# Patient Record
Sex: FEMALE | Race: WHITE | URBAN - METROPOLITAN AREA
[De-identification: names, ages, dates, MRNs, and addresses within clinical notes are randomized per-mention and may not be internally consistent; named-entity substitution may affect disease eponyms.]

---

## 2017-01-18 ENCOUNTER — TELEPHONE (OUTPATIENT)
Dept: INTERNAL MEDICINE | Age: 82
End: 2017-01-18

## 2017-01-18 DIAGNOSIS — N39.0 URINARY TRACT INFECTION WITH HEMATURIA, SITE UNSPECIFIED: Primary | ICD-10-CM

## 2017-01-18 DIAGNOSIS — R31.9 URINARY TRACT INFECTION WITH HEMATURIA, SITE UNSPECIFIED: Primary | ICD-10-CM

## 2017-01-18 LAB
BILIRUBIN, POC: NEGATIVE
BLOOD URINE, POC: NORMAL
CLARITY, POC: CLEAR
COLOR, POC: YELLOW
GLUCOSE URINE, POC: NEGATIVE
KETONES, POC: NEGATIVE
LEUKOCYTE EST, POC: NORMAL
NITRITE, POC: NEGATIVE
PH, POC: 5
PROTEIN, POC: NEGATIVE
SPECIFIC GRAVITY, POC: 1.03
UROBILINOGEN, POC: NEGATIVE

## 2017-01-18 PROCEDURE — 81002 URINALYSIS NONAUTO W/O SCOPE: CPT | Performed by: INTERNAL MEDICINE

## 2017-01-18 RX ORDER — SULFAMETHOXAZOLE AND TRIMETHOPRIM 800; 160 MG/1; MG/1
1 TABLET ORAL 2 TIMES DAILY
Qty: 10 TABLET | Refills: 0 | Status: SHIPPED | OUTPATIENT
Start: 2017-01-18 | End: 2017-01-23

## 2017-01-20 LAB — URINE CULTURE, ROUTINE: NORMAL

## 2017-01-27 ENCOUNTER — TELEPHONE (OUTPATIENT)
Dept: INTERNAL MEDICINE | Age: 82
End: 2017-01-27

## 2017-02-10 ENCOUNTER — CARE COORDINATION (OUTPATIENT)
Dept: CARE COORDINATION | Age: 82
End: 2017-02-10

## 2017-02-21 ENCOUNTER — CARE COORDINATION (OUTPATIENT)
Dept: CARE COORDINATION | Age: 82
End: 2017-02-21

## 2017-02-27 ENCOUNTER — OFFICE VISIT (OUTPATIENT)
Dept: INTERNAL MEDICINE | Age: 82
End: 2017-02-27

## 2017-02-27 VITALS
DIASTOLIC BLOOD PRESSURE: 50 MMHG | OXYGEN SATURATION: 96 % | SYSTOLIC BLOOD PRESSURE: 108 MMHG | HEART RATE: 114 BPM | TEMPERATURE: 97.8 F

## 2017-02-27 DIAGNOSIS — E11.9 TYPE 2 DIABETES MELLITUS WITHOUT COMPLICATION, WITHOUT LONG-TERM CURRENT USE OF INSULIN (HCC): Primary | ICD-10-CM

## 2017-02-27 DIAGNOSIS — I10 ESSENTIAL HYPERTENSION, BENIGN: ICD-10-CM

## 2017-02-27 DIAGNOSIS — G30.1 LATE ONSET ALZHEIMER'S DISEASE WITHOUT BEHAVIORAL DISTURBANCE (HCC): ICD-10-CM

## 2017-02-27 DIAGNOSIS — F02.80 LATE ONSET ALZHEIMER'S DISEASE WITHOUT BEHAVIORAL DISTURBANCE (HCC): ICD-10-CM

## 2017-02-27 LAB
A/G RATIO: 1.6 (ref 1.1–2.2)
ALBUMIN SERPL-MCNC: 4 G/DL (ref 3.4–5)
ALP BLD-CCNC: 77 U/L (ref 40–129)
ALT SERPL-CCNC: 9 U/L (ref 10–40)
ANION GAP SERPL CALCULATED.3IONS-SCNC: 20 MMOL/L (ref 3–16)
AST SERPL-CCNC: 13 U/L (ref 15–37)
BASOPHILS ABSOLUTE: 0.1 K/UL (ref 0–0.2)
BASOPHILS RELATIVE PERCENT: 0.5 %
BILIRUB SERPL-MCNC: 0.3 MG/DL (ref 0–1)
BUN BLDV-MCNC: 17 MG/DL (ref 7–20)
CALCIUM SERPL-MCNC: 9.1 MG/DL (ref 8.3–10.6)
CHLORIDE BLD-SCNC: 97 MMOL/L (ref 99–110)
CHOLESTEROL, TOTAL: 203 MG/DL (ref 0–199)
CO2: 19 MMOL/L (ref 21–32)
CREAT SERPL-MCNC: 1 MG/DL (ref 0.6–1.2)
EOSINOPHILS ABSOLUTE: 0.1 K/UL (ref 0–0.6)
EOSINOPHILS RELATIVE PERCENT: 0.7 %
GFR AFRICAN AMERICAN: >60
GFR NON-AFRICAN AMERICAN: 53
GLOBULIN: 2.5 G/DL
GLUCOSE BLD-MCNC: 281 MG/DL (ref 70–99)
HCT VFR BLD CALC: 34.4 % (ref 36–48)
HDLC SERPL-MCNC: 51 MG/DL (ref 40–60)
HEMOGLOBIN: 10.8 G/DL (ref 12–16)
LDL CHOLESTEROL CALCULATED: 123 MG/DL
LYMPHOCYTES ABSOLUTE: 1.3 K/UL (ref 1–5.1)
LYMPHOCYTES RELATIVE PERCENT: 10 %
MCH RBC QN AUTO: 24.3 PG (ref 26–34)
MCHC RBC AUTO-ENTMCNC: 31.4 G/DL (ref 31–36)
MCV RBC AUTO: 77.6 FL (ref 80–100)
MONOCYTES ABSOLUTE: 0.8 K/UL (ref 0–1.3)
MONOCYTES RELATIVE PERCENT: 6.4 %
NEUTROPHILS ABSOLUTE: 10.9 K/UL (ref 1.7–7.7)
NEUTROPHILS RELATIVE PERCENT: 82.4 %
PDW BLD-RTO: 16.4 % (ref 12.4–15.4)
PLATELET # BLD: 278 K/UL (ref 135–450)
PMV BLD AUTO: 9.4 FL (ref 5–10.5)
POTASSIUM SERPL-SCNC: 4.6 MMOL/L (ref 3.5–5.1)
RBC # BLD: 4.43 M/UL (ref 4–5.2)
SODIUM BLD-SCNC: 136 MMOL/L (ref 136–145)
TOTAL PROTEIN: 6.5 G/DL (ref 6.4–8.2)
TRIGL SERPL-MCNC: 145 MG/DL (ref 0–150)
VLDLC SERPL CALC-MCNC: 29 MG/DL
WBC # BLD: 13.2 K/UL (ref 4–11)

## 2017-02-27 PROCEDURE — 1090F PRES/ABSN URINE INCON ASSESS: CPT | Performed by: INTERNAL MEDICINE

## 2017-02-27 PROCEDURE — 99214 OFFICE O/P EST MOD 30 MIN: CPT | Performed by: INTERNAL MEDICINE

## 2017-02-27 PROCEDURE — G8427 DOCREV CUR MEDS BY ELIG CLIN: HCPCS | Performed by: INTERNAL MEDICINE

## 2017-02-27 PROCEDURE — 36415 COLL VENOUS BLD VENIPUNCTURE: CPT | Performed by: INTERNAL MEDICINE

## 2017-02-27 PROCEDURE — G8484 FLU IMMUNIZE NO ADMIN: HCPCS | Performed by: INTERNAL MEDICINE

## 2017-02-27 PROCEDURE — G8399 PT W/DXA RESULTS DOCUMENT: HCPCS | Performed by: INTERNAL MEDICINE

## 2017-02-27 PROCEDURE — 1123F ACP DISCUSS/DSCN MKR DOCD: CPT | Performed by: INTERNAL MEDICINE

## 2017-02-27 PROCEDURE — 4040F PNEUMOC VAC/ADMIN/RCVD: CPT | Performed by: INTERNAL MEDICINE

## 2017-02-27 PROCEDURE — 1036F TOBACCO NON-USER: CPT | Performed by: INTERNAL MEDICINE

## 2017-02-27 PROCEDURE — G8419 CALC BMI OUT NRM PARAM NOF/U: HCPCS | Performed by: INTERNAL MEDICINE

## 2017-02-27 RX ORDER — OMEPRAZOLE 20 MG/1
20 CAPSULE, DELAYED RELEASE ORAL DAILY
Qty: 90 CAPSULE | Refills: 3 | Status: SHIPPED | OUTPATIENT
Start: 2017-02-27

## 2017-02-27 RX ORDER — NITROFURANTOIN MACROCRYSTALS 50 MG/1
50 CAPSULE ORAL DAILY
Qty: 90 CAPSULE | Refills: 5 | Status: SHIPPED | OUTPATIENT
Start: 2017-02-27

## 2017-02-27 RX ORDER — RIVASTIGMINE 9.5 MG/24H
1 PATCH, EXTENDED RELEASE TRANSDERMAL DAILY
Qty: 30 PATCH | Refills: 5 | Status: SHIPPED | OUTPATIENT
Start: 2017-02-27

## 2017-02-27 RX ORDER — GLIMEPIRIDE 2 MG/1
4 TABLET ORAL 2 TIMES DAILY WITH MEALS
Qty: 360 TABLET | Refills: 3 | Status: SHIPPED | OUTPATIENT
Start: 2017-02-27

## 2017-02-27 ASSESSMENT — ENCOUNTER SYMPTOMS
BACK PAIN: 0
GASTROINTESTINAL NEGATIVE: 1

## 2017-02-28 LAB
ESTIMATED AVERAGE GLUCOSE: 228.8 MG/DL
HBA1C MFR BLD: 9.6 %

## 2017-03-07 ENCOUNTER — CARE COORDINATION (OUTPATIENT)
Dept: CARE COORDINATION | Age: 82
End: 2017-03-07

## 2017-04-11 RX ORDER — RIVASTIGMINE 9.5 MG/24H
PATCH, EXTENDED RELEASE TRANSDERMAL
Qty: 90 PATCH | Refills: 3 | Status: SHIPPED | OUTPATIENT
Start: 2017-04-11

## 2017-04-11 RX ORDER — NITROFURANTOIN MACROCRYSTALS 50 MG/1
CAPSULE ORAL
Qty: 90 CAPSULE | Refills: 3 | Status: SHIPPED | OUTPATIENT
Start: 2017-04-11

## 2017-04-17 ENCOUNTER — TELEPHONE (OUTPATIENT)
Dept: INTERNAL MEDICINE | Age: 82
End: 2017-04-17

## 2017-06-06 ENCOUNTER — CARE COORDINATION (OUTPATIENT)
Dept: CARE COORDINATION | Age: 82
End: 2017-06-06

## 2017-06-29 ENCOUNTER — CARE COORDINATION (OUTPATIENT)
Dept: CARE COORDINATION | Age: 82
End: 2017-06-29

## 2017-08-17 ENCOUNTER — APPOINTMENT (OUTPATIENT)
Dept: GENERAL RADIOLOGY | Facility: HOSPITAL | Age: 82
End: 2017-08-17

## 2017-08-17 ENCOUNTER — HOSPITAL ENCOUNTER (EMERGENCY)
Facility: HOSPITAL | Age: 82
Discharge: HOME | End: 2017-08-17
Attending: EMERGENCY MEDICINE | Admitting: EMERGENCY MEDICINE

## 2017-08-17 ENCOUNTER — APPOINTMENT (OUTPATIENT)
Dept: CT IMAGING | Facility: HOSPITAL | Age: 82
End: 2017-08-17

## 2017-08-17 VITALS
HEIGHT: 59 IN | RESPIRATION RATE: 20 BRPM | SYSTOLIC BLOOD PRESSURE: 135 MMHG | WEIGHT: 187.5 LBS | TEMPERATURE: 98.4 F | OXYGEN SATURATION: 100 % | DIASTOLIC BLOOD PRESSURE: 69 MMHG | HEART RATE: 77 BPM | BODY MASS INDEX: 37.8 KG/M2

## 2017-08-17 DIAGNOSIS — N39.0 ACUTE UTI: Primary | ICD-10-CM

## 2017-08-17 DIAGNOSIS — F03.90: ICD-10-CM

## 2017-08-17 LAB
A-A DO2: 106.7 MMHG (ref 0–300)
ALBUMIN SERPL-MCNC: 4.3 G/DL (ref 3.4–4.8)
ALBUMIN/GLOB SERPL: 1.5 G/DL (ref 1.5–2.5)
ALP SERPL-CCNC: 65 U/L (ref 35–104)
ALT SERPL W P-5'-P-CCNC: 13 U/L (ref 10–36)
ANION GAP SERPL CALCULATED.3IONS-SCNC: 7 MMOL/L (ref 3.6–11.2)
AST SERPL-CCNC: 23 U/L (ref 10–30)
ATMOSPHERIC PRESS: 728 MMHG
BACTERIA UR QL AUTO: ABNORMAL /HPF
BASE EXCESS BLDV CALC-SCNC: 1.5 MMOL/L
BASOPHILS # BLD AUTO: 0.05 10*3/MM3 (ref 0–0.3)
BASOPHILS NFR BLD AUTO: 0.4 % (ref 0–2)
BDY SITE: NORMAL
BILIRUB SERPL-MCNC: 0.6 MG/DL (ref 0.2–1.8)
BILIRUB UR QL STRIP: ABNORMAL
BNP SERPL-MCNC: 72 PG/ML (ref 0–100)
BODY TEMPERATURE: 98.6 C
BUN BLD-MCNC: 16 MG/DL (ref 7–21)
BUN/CREAT SERPL: 13.7 (ref 7–25)
CALCIUM SPEC-SCNC: 9.3 MG/DL (ref 7.7–10)
CHLORIDE SERPL-SCNC: 102 MMOL/L (ref 99–112)
CK MB SERPL-CCNC: 5.09 NG/ML (ref 0–5)
CK MB SERPL-RTO: 1.8 % (ref 0–3)
CK SERPL-CCNC: 278 U/L (ref 24–173)
CLARITY UR: ABNORMAL
CO2 SERPL-SCNC: 27 MMOL/L (ref 24.3–31.9)
COLOR UR: ABNORMAL
CREAT BLD-MCNC: 1.17 MG/DL (ref 0.43–1.29)
D-LACTATE SERPL-SCNC: 1.6 MMOL/L (ref 0.5–2)
DEPRECATED RDW RBC AUTO: 46.8 FL (ref 37–54)
EOSINOPHIL # BLD AUTO: 0.38 10*3/MM3 (ref 0–0.7)
EOSINOPHIL NFR BLD AUTO: 2.8 % (ref 0–7)
ERYTHROCYTE [DISTWIDTH] IN BLOOD BY AUTOMATED COUNT: 17 % (ref 11.5–14.5)
GFR SERPL CREATININE-BSD FRML MDRD: 44 ML/MIN/1.73
GLOBULIN UR ELPH-MCNC: 2.8 GM/DL
GLUCOSE BLD-MCNC: 167 MG/DL (ref 70–110)
GLUCOSE BLDC GLUCOMTR-MCNC: 108 MG/DL (ref 70–130)
GLUCOSE BLDC GLUCOMTR-MCNC: 175 MG/DL (ref 70–130)
GLUCOSE UR STRIP-MCNC: ABNORMAL MG/DL
HCO3 BLDV-SCNC: 27.5 MMOL/L
HCT VFR BLD AUTO: 34.5 % (ref 37–47)
HGB BLD-MCNC: 10.2 G/DL (ref 12–16)
HGB UR QL STRIP.AUTO: ABNORMAL
HOLD SPECIMEN: NORMAL
HOLD SPECIMEN: NORMAL
HOROWITZ INDEX BLD+IHG-RTO: 28 %
HYALINE CASTS UR QL AUTO: ABNORMAL /LPF
IMM GRANULOCYTES # BLD: 0.07 10*3/MM3 (ref 0–0.03)
IMM GRANULOCYTES NFR BLD: 0.5 % (ref 0–0.5)
KETONES UR QL STRIP: ABNORMAL
LEUKOCYTE ESTERASE UR QL STRIP.AUTO: ABNORMAL
LYMPHOCYTES # BLD AUTO: 2.04 10*3/MM3 (ref 1–3)
LYMPHOCYTES NFR BLD AUTO: 15.1 % (ref 16–46)
MCH RBC QN AUTO: 23.1 PG (ref 27–33)
MCHC RBC AUTO-ENTMCNC: 29.6 G/DL (ref 33–37)
MCV RBC AUTO: 78.2 FL (ref 80–94)
MODALITY: NORMAL
MONOCYTES # BLD AUTO: 1.88 10*3/MM3 (ref 0.1–0.9)
MONOCYTES NFR BLD AUTO: 14 % (ref 0–12)
NEUTROPHILS # BLD AUTO: 9.05 10*3/MM3 (ref 1.4–6.5)
NEUTROPHILS NFR BLD AUTO: 67.2 % (ref 40–75)
NITRITE UR QL STRIP: NEGATIVE
OSMOLALITY SERPL CALC.SUM OF ELEC: 277 MOSM/KG (ref 273–305)
PCO2 BLDV: 49.8 MM HG
PH BLDV: 7.36 [PH]
PH UR STRIP.AUTO: <=5 [PH] (ref 5–8)
PLATELET # BLD AUTO: 289 10*3/MM3 (ref 130–400)
PMV BLD AUTO: 10.7 FL (ref 6–10)
PO2 BLDV: 25.2 MM HG
POTASSIUM BLD-SCNC: 4.3 MMOL/L (ref 3.5–5.3)
PROT SERPL-MCNC: 7.1 G/DL (ref 6–8)
PROT UR QL STRIP: ABNORMAL
RBC # BLD AUTO: 4.41 10*6/MM3 (ref 4.2–5.4)
RBC # UR: ABNORMAL /HPF
REF LAB TEST METHOD: ABNORMAL
SODIUM BLD-SCNC: 136 MMOL/L (ref 135–153)
SP GR UR STRIP: 1.02 (ref 1–1.03)
SQUAMOUS #/AREA URNS HPF: ABNORMAL /HPF
TROPONIN I SERPL-MCNC: <0.006 NG/ML
UROBILINOGEN UR QL STRIP: ABNORMAL
WBC NRBC COR # BLD: 13.47 10*3/MM3 (ref 4.5–12.5)
WBC UR QL AUTO: ABNORMAL /HPF
WHOLE BLOOD HOLD SPECIMEN: NORMAL
WHOLE BLOOD HOLD SPECIMEN: NORMAL

## 2017-08-17 PROCEDURE — 51702 INSERT TEMP BLADDER CATH: CPT

## 2017-08-17 PROCEDURE — 71010 HC CHEST PA OR AP: CPT

## 2017-08-17 PROCEDURE — 70450 CT HEAD/BRAIN W/O DYE: CPT | Performed by: RADIOLOGY

## 2017-08-17 PROCEDURE — 82805 BLOOD GASES W/O2 SATURATION: CPT | Performed by: EMERGENCY MEDICINE

## 2017-08-17 PROCEDURE — 82550 ASSAY OF CK (CPK): CPT | Performed by: PHYSICIAN ASSISTANT

## 2017-08-17 PROCEDURE — 83880 ASSAY OF NATRIURETIC PEPTIDE: CPT | Performed by: PHYSICIAN ASSISTANT

## 2017-08-17 PROCEDURE — 96361 HYDRATE IV INFUSION ADD-ON: CPT

## 2017-08-17 PROCEDURE — 82553 CREATINE MB FRACTION: CPT | Performed by: PHYSICIAN ASSISTANT

## 2017-08-17 PROCEDURE — 70450 CT HEAD/BRAIN W/O DYE: CPT

## 2017-08-17 PROCEDURE — 99284 EMERGENCY DEPT VISIT MOD MDM: CPT

## 2017-08-17 PROCEDURE — 87186 SC STD MICRODIL/AGAR DIL: CPT | Performed by: PHYSICIAN ASSISTANT

## 2017-08-17 PROCEDURE — 83605 ASSAY OF LACTIC ACID: CPT | Performed by: PHYSICIAN ASSISTANT

## 2017-08-17 PROCEDURE — 85025 COMPLETE CBC W/AUTO DIFF WBC: CPT | Performed by: PHYSICIAN ASSISTANT

## 2017-08-17 PROCEDURE — 25010000002 CEFTRIAXONE: Performed by: PHYSICIAN ASSISTANT

## 2017-08-17 PROCEDURE — 93005 ELECTROCARDIOGRAM TRACING: CPT | Performed by: PHYSICIAN ASSISTANT

## 2017-08-17 PROCEDURE — 84484 ASSAY OF TROPONIN QUANT: CPT | Performed by: PHYSICIAN ASSISTANT

## 2017-08-17 PROCEDURE — 87077 CULTURE AEROBIC IDENTIFY: CPT | Performed by: PHYSICIAN ASSISTANT

## 2017-08-17 PROCEDURE — 71010 XR CHEST 1 VW: CPT | Performed by: RADIOLOGY

## 2017-08-17 PROCEDURE — 87040 BLOOD CULTURE FOR BACTERIA: CPT | Performed by: PHYSICIAN ASSISTANT

## 2017-08-17 PROCEDURE — 96374 THER/PROPH/DIAG INJ IV PUSH: CPT

## 2017-08-17 PROCEDURE — 82962 GLUCOSE BLOOD TEST: CPT

## 2017-08-17 PROCEDURE — 80053 COMPREHEN METABOLIC PANEL: CPT | Performed by: PHYSICIAN ASSISTANT

## 2017-08-17 PROCEDURE — 87086 URINE CULTURE/COLONY COUNT: CPT | Performed by: PHYSICIAN ASSISTANT

## 2017-08-17 PROCEDURE — 81001 URINALYSIS AUTO W/SCOPE: CPT | Performed by: PHYSICIAN ASSISTANT

## 2017-08-17 PROCEDURE — 81003 URINALYSIS AUTO W/O SCOPE: CPT | Performed by: PHYSICIAN ASSISTANT

## 2017-08-17 RX ORDER — ERGOCALCIFEROL 1.25 MG/1
50000 CAPSULE ORAL WEEKLY
Status: ON HOLD | COMMUNITY
End: 2019-02-06

## 2017-08-17 RX ORDER — VALSARTAN 160 MG/1
320 TABLET ORAL DAILY
COMMUNITY
End: 2017-10-26 | Stop reason: DRUGHIGH

## 2017-08-17 RX ORDER — SODIUM CHLORIDE 0.9 % (FLUSH) 0.9 %
10 SYRINGE (ML) INJECTION AS NEEDED
Status: DISCONTINUED | OUTPATIENT
Start: 2017-08-17 | End: 2017-08-18 | Stop reason: HOSPADM

## 2017-08-17 RX ORDER — RIVASTIGMINE 9.5 MG/24H
1 PATCH, EXTENDED RELEASE TRANSDERMAL DAILY
COMMUNITY

## 2017-08-17 RX ORDER — CITALOPRAM 20 MG/1
20 TABLET ORAL DAILY
COMMUNITY
End: 2017-11-06 | Stop reason: HOSPADM

## 2017-08-17 RX ORDER — ONDANSETRON 4 MG/1
4 TABLET, FILM COATED ORAL EVERY 8 HOURS PRN
COMMUNITY
End: 2017-10-26

## 2017-08-17 RX ORDER — NITROFURANTOIN MACROCRYSTALS 50 MG/1
50 CAPSULE ORAL DAILY
COMMUNITY
End: 2017-11-06 | Stop reason: HOSPADM

## 2017-08-17 RX ORDER — OMEPRAZOLE 20 MG/1
20 CAPSULE, DELAYED RELEASE ORAL DAILY
COMMUNITY

## 2017-08-17 RX ORDER — AMOXICILLIN AND CLAVULANATE POTASSIUM 875; 125 MG/1; MG/1
1 TABLET, FILM COATED ORAL 2 TIMES DAILY
Qty: 20 TABLET | Refills: 0 | Status: SHIPPED | OUTPATIENT
Start: 2017-08-17 | End: 2017-10-26

## 2017-08-17 RX ORDER — GLIMEPIRIDE 2 MG/1
2 TABLET ORAL 2 TIMES DAILY
COMMUNITY
End: 2017-10-26

## 2017-08-17 RX ORDER — MECLIZINE HCL 12.5 MG/1
12.5 TABLET ORAL 3 TIMES DAILY PRN
COMMUNITY
End: 2017-10-26

## 2017-08-17 RX ORDER — LORATADINE 10 MG/1
CAPSULE, LIQUID FILLED ORAL
COMMUNITY
End: 2017-10-26

## 2017-08-17 RX ADMIN — CEFTRIAXONE 1 G: 1 INJECTION, POWDER, FOR SOLUTION INTRAMUSCULAR; INTRAVENOUS at 17:46

## 2017-08-17 RX ADMIN — SODIUM CHLORIDE 1000 ML: 9 INJECTION, SOLUTION INTRAVENOUS at 20:33

## 2017-08-17 RX ADMIN — SODIUM CHLORIDE 1000 ML: 9 INJECTION, SOLUTION INTRAVENOUS at 17:45

## 2017-08-18 NOTE — ED NOTES
MICHELLE CATH TAKEN OUT AT 21:50 AS ORDERED.  PT HAD TOTAL OF 400ML SLIGHTLY YELLOWISH ORANGE      Jaz Whyte, RN  08/17/17 2439

## 2017-08-18 NOTE — ED PROVIDER NOTES
Subjective   HPI Comments: 83 yo WF, presented to ER accompanied by daughter and son in law w/ increased altered mental status.  Pt daughter is source.  Pt suffers from dementia, however over the last two days sx if become increasingly worse.  Daughter admitted to multiple episodes of incontinence, and that pt suffers from chronic UTI.  Pt also recently has been started on Farxiga and Januvia for control of her diabetes.  Daughter denies any hx of cardiovascular illness, however admitted that pt has had cough that has become increasingly worse over last few days.       Review of Systems   Constitutional: Negative.  Negative for fever.   HENT: Negative.    Respiratory: Positive for cough.    Cardiovascular: Negative.  Negative for chest pain.   Gastrointestinal: Negative.  Negative for abdominal pain.   Endocrine: Negative.    Genitourinary: Positive for urgency. Negative for dysuria.   Skin: Negative.    Neurological: Positive for dizziness.   Psychiatric/Behavioral: Positive for confusion.   All other systems reviewed and are negative.      Past Medical History:   Diagnosis Date   • Diabetes mellitus    • Hypertension    • Pancreatitis        Allergies   Allergen Reactions   • Codeine    • Metformin And Related        Past Surgical History:   Procedure Laterality Date   • ANKLE SURGERY     • HYSTERECTOMY     • SHOULDER SURGERY     • TYMPANOPLASTY         History reviewed. No pertinent family history.    Social History     Social History   • Marital status:      Spouse name: N/A   • Number of children: N/A   • Years of education: N/A     Social History Main Topics   • Smoking status: Never Smoker   • Smokeless tobacco: None   • Alcohol use No   • Drug use: No   • Sexual activity: Not Asked     Other Topics Concern   • None     Social History Narrative   • None           Objective   Physical Exam   Constitutional: She is oriented to person, place, and time. She appears well-developed and well-nourished. No  distress.   HENT:   Head: Normocephalic and atraumatic.   Nose: Nose normal.   Eyes: Conjunctivae and EOM are normal. Pupils are equal, round, and reactive to light.   Neck: Normal range of motion. Neck supple. No JVD present. No tracheal deviation present.   Cardiovascular: Normal rate, regular rhythm and normal heart sounds.    No murmur heard.  Pulmonary/Chest: Effort normal. No respiratory distress. She has no wheezes. She has rales.   Abdominal: Soft. Bowel sounds are normal. There is tenderness (suprapubic. ).   Musculoskeletal: Normal range of motion. She exhibits no edema or deformity.   Pt was not able to stand to evaluate drift, or gait.  Obvious confusion to orientation.  No asymmetry w/ facial movements.     Neurological: She is alert and oriented to person, place, and time. No cranial nerve deficit.   Skin: Skin is warm and dry. No rash noted. She is not diaphoretic. No erythema. No pallor.   Psychiatric: She has a normal mood and affect. Her behavior is normal. Thought content normal.   Nursing note and vitals reviewed.      Procedures         ED Course  ED Course   Comment By Time   EKG reviewed by Dr Whyte:  Sinus rhythm w/ PAC, RBBB KACI Torre 08/17 1854   CT Head [facility]:  No acute intracranial hemorrhage.  Cerebral volume loss / atrophy and old right basal ganglia lacunar infarct.  Periventricular and deep white matter old microvascular ischemic changes are also described. KACI Torre 08/17 1936   CXR reviewed by Dr. Whyte:  Interstitial lung disease. KACI Torre 08/17 1936                  MDM  Number of Diagnoses or Management Options  new and requires workup  established and worsening     Amount and/or Complexity of Data Reviewed  Clinical lab tests: ordered and reviewed  Tests in the radiology section of CPT®: ordered and reviewed    Risk of Complications, Morbidity, and/or Mortality  Presenting problems: moderate  Diagnostic procedures: moderate  Management options:  low    Patient Progress  Patient progress: stable      Final diagnoses:   Acute UTI   Chronic dementia, without behavioral disturbance            KACI Torre  08/18/17 5569

## 2017-08-20 LAB — BACTERIA SPEC AEROBE CULT: ABNORMAL

## 2017-08-22 LAB — BACTERIA SPEC AEROBE CULT: NORMAL

## 2017-09-27 ENCOUNTER — CARE COORDINATION (OUTPATIENT)
Dept: CARE COORDINATION | Age: 82
End: 2017-09-27

## 2017-10-26 ENCOUNTER — APPOINTMENT (OUTPATIENT)
Dept: CT IMAGING | Facility: HOSPITAL | Age: 82
End: 2017-10-26

## 2017-10-26 ENCOUNTER — HOSPITAL ENCOUNTER (INPATIENT)
Facility: HOSPITAL | Age: 82
LOS: 11 days | Discharge: NURSING, REHAB OR LONG-TERM CARE | End: 2017-11-06
Attending: EMERGENCY MEDICINE | Admitting: HOSPITALIST

## 2017-10-26 ENCOUNTER — APPOINTMENT (OUTPATIENT)
Dept: CARDIOLOGY | Facility: HOSPITAL | Age: 82
End: 2017-10-26
Attending: HOSPITALIST

## 2017-10-26 ENCOUNTER — APPOINTMENT (OUTPATIENT)
Dept: GENERAL RADIOLOGY | Facility: HOSPITAL | Age: 82
End: 2017-10-26

## 2017-10-26 DIAGNOSIS — N30.00 ACUTE CYSTITIS WITHOUT HEMATURIA: ICD-10-CM

## 2017-10-26 DIAGNOSIS — I21.4 NSTEMI (NON-ST ELEVATED MYOCARDIAL INFARCTION) (HCC): Primary | ICD-10-CM

## 2017-10-26 PROBLEM — A41.9 SEPSIS DUE TO PNEUMONIA (HCC): Status: ACTIVE | Noted: 2017-10-26

## 2017-10-26 PROBLEM — N39.0 SEPSIS SECONDARY TO UTI (HCC): Status: ACTIVE | Noted: 2017-10-26

## 2017-10-26 PROBLEM — J18.9 SEPSIS DUE TO PNEUMONIA (HCC): Status: ACTIVE | Noted: 2017-10-26

## 2017-10-26 PROBLEM — F03.90 DEMENTIA (HCC): Chronic | Status: ACTIVE | Noted: 2017-10-26

## 2017-10-26 PROBLEM — R91.8 PULMONARY NODULES: Status: ACTIVE | Noted: 2017-10-26

## 2017-10-26 PROBLEM — K21.9 GERD (GASTROESOPHAGEAL REFLUX DISEASE): Chronic | Status: ACTIVE | Noted: 2017-10-26

## 2017-10-26 PROBLEM — J96.01 ACUTE RESPIRATORY FAILURE WITH HYPOXIA (HCC): Status: ACTIVE | Noted: 2017-10-26

## 2017-10-26 PROBLEM — R54 ADVANCED AGE: Chronic | Status: ACTIVE | Noted: 2017-10-26

## 2017-10-26 PROBLEM — I45.10 RBBB: Chronic | Status: ACTIVE | Noted: 2017-10-26

## 2017-10-26 PROBLEM — K44.9 HIATAL HERNIA: Chronic | Status: ACTIVE | Noted: 2017-10-26

## 2017-10-26 PROBLEM — R54 ADVANCED AGE: Status: ACTIVE | Noted: 2017-10-26

## 2017-10-26 PROBLEM — E11.9 TYPE II DIABETES MELLITUS (HCC): Chronic | Status: ACTIVE | Noted: 2017-10-26

## 2017-10-26 PROBLEM — I10 ESSENTIAL HYPERTENSION: Chronic | Status: ACTIVE | Noted: 2017-10-26

## 2017-10-26 PROBLEM — I63.81 LACUNAR INFARCTION (HCC): Chronic | Status: ACTIVE | Noted: 2017-10-26

## 2017-10-26 PROBLEM — I63.81 LACUNAR INFARCTION (HCC): Status: ACTIVE | Noted: 2017-10-26

## 2017-10-26 PROBLEM — A41.9 SEPSIS SECONDARY TO UTI (HCC): Status: ACTIVE | Noted: 2017-10-26

## 2017-10-26 LAB
A-A DO2: 44.3 MMHG (ref 0–300)
ALBUMIN SERPL-MCNC: 4 G/DL (ref 3.4–4.8)
ALBUMIN/GLOB SERPL: 1.4 G/DL (ref 1.5–2.5)
ALP SERPL-CCNC: 70 U/L (ref 35–104)
ALT SERPL W P-5'-P-CCNC: 18 U/L (ref 10–36)
ANION GAP SERPL CALCULATED.3IONS-SCNC: 9.7 MMOL/L (ref 3.6–11.2)
APTT PPP: 27.9 SECONDS (ref 23.8–36.1)
ARTERIAL PATENCY WRIST A: ABNORMAL
AST SERPL-CCNC: 26 U/L (ref 10–30)
ATMOSPHERIC PRESS: 723 MMHG
BACTERIA UR QL AUTO: ABNORMAL /HPF
BASE EXCESS BLDA CALC-SCNC: 0.2 MMOL/L
BASOPHILS # BLD AUTO: 0.03 10*3/MM3 (ref 0–0.3)
BASOPHILS NFR BLD AUTO: 0.3 % (ref 0–2)
BDY SITE: ABNORMAL
BILIRUB SERPL-MCNC: 0.7 MG/DL (ref 0.2–1.8)
BILIRUB UR QL STRIP: NEGATIVE
BNP SERPL-MCNC: 171 PG/ML (ref 0–100)
BODY TEMPERATURE: 98.6 C
BUN BLD-MCNC: 14 MG/DL (ref 7–21)
BUN/CREAT SERPL: 15.6 (ref 7–25)
CALCIUM SPEC-SCNC: 9.3 MG/DL (ref 7.7–10)
CHLORIDE SERPL-SCNC: 101 MMOL/L (ref 99–112)
CHOLEST SERPL-MCNC: 150 MG/DL (ref 0–200)
CK MB SERPL-CCNC: 18.84 NG/ML (ref 0–5)
CK MB SERPL-CCNC: 23.56 NG/ML (ref 0–5)
CK MB SERPL-CCNC: 26.35 NG/ML (ref 0–5)
CK MB SERPL-CCNC: 28.68 NG/ML (ref 0–5)
CK MB SERPL-CCNC: 31.58 NG/ML (ref 0–5)
CK MB SERPL-RTO: 11.2 % (ref 0–3)
CK MB SERPL-RTO: 11.9 % (ref 0–3)
CK MB SERPL-RTO: 13.6 % (ref 0–3)
CK MB SERPL-RTO: 13.9 % (ref 0–3)
CK MB SERPL-RTO: 14.1 % (ref 0–3)
CK SERPL-CCNC: 139 U/L (ref 24–173)
CK SERPL-CCNC: 170 U/L (ref 24–173)
CK SERPL-CCNC: 203 U/L (ref 24–173)
CK SERPL-CCNC: 235 U/L (ref 24–173)
CK SERPL-CCNC: 265 U/L (ref 24–173)
CLARITY UR: ABNORMAL
CO2 SERPL-SCNC: 26.3 MMOL/L (ref 24.3–31.9)
COHGB MFR BLD: 1.2 % (ref 0–5)
COLOR UR: YELLOW
CREAT BLD-MCNC: 0.9 MG/DL (ref 0.43–1.29)
D DIMER PPP FEU-MCNC: 0.28 MCGFEU/ML (ref 0–0.5)
D-LACTATE SERPL-SCNC: 1.8 MMOL/L (ref 0.5–2)
DEPRECATED RDW RBC AUTO: 59 FL (ref 37–54)
EOSINOPHIL # BLD AUTO: 0.04 10*3/MM3 (ref 0–0.7)
EOSINOPHIL NFR BLD AUTO: 0.4 % (ref 0–7)
ERYTHROCYTE [DISTWIDTH] IN BLOOD BY AUTOMATED COUNT: 19 % (ref 11.5–14.5)
FLUAV AG NPH QL: NEGATIVE
FLUBV AG NPH QL IA: NEGATIVE
GFR SERPL CREATININE-BSD FRML MDRD: 60 ML/MIN/1.73
GLOBULIN UR ELPH-MCNC: 2.8 GM/DL
GLUCOSE BLD-MCNC: 248 MG/DL (ref 70–110)
GLUCOSE BLDC GLUCOMTR-MCNC: 174 MG/DL (ref 70–130)
GLUCOSE BLDC GLUCOMTR-MCNC: 249 MG/DL (ref 70–130)
GLUCOSE BLDC GLUCOMTR-MCNC: 298 MG/DL (ref 70–130)
GLUCOSE UR STRIP-MCNC: ABNORMAL MG/DL
HBA1C MFR BLD: 8.9 % (ref 4.5–5.7)
HCO3 BLDA-SCNC: 25.3 MMOL/L (ref 22–26)
HCT VFR BLD AUTO: 41.9 % (ref 37–47)
HCT VFR BLD CALC: 39 % (ref 37–47)
HDLC SERPL-MCNC: 34 MG/DL (ref 60–100)
HGB BLD-MCNC: 13.2 G/DL (ref 12–16)
HGB BLDA-MCNC: 13.4 G/DL (ref 12–16)
HGB UR QL STRIP.AUTO: ABNORMAL
HOROWITZ INDEX BLD+IHG-RTO: 21 %
HYALINE CASTS UR QL AUTO: ABNORMAL /LPF
IMM GRANULOCYTES # BLD: 0.05 10*3/MM3 (ref 0–0.03)
IMM GRANULOCYTES NFR BLD: 0.5 % (ref 0–0.5)
INR PPP: 1.14 (ref 0.9–1.1)
KETONES UR QL STRIP: NEGATIVE
L PNEUMO1 AG UR QL IA: NEGATIVE
LDLC SERPL CALC-MCNC: 93 MG/DL (ref 0–100)
LDLC/HDLC SERPL: 2.72 {RATIO}
LEUKOCYTE ESTERASE UR QL STRIP.AUTO: ABNORMAL
LIPASE SERPL-CCNC: 26 U/L (ref 13–60)
LYMPHOCYTES # BLD AUTO: 0.62 10*3/MM3 (ref 1–3)
LYMPHOCYTES NFR BLD AUTO: 5.6 % (ref 16–46)
M PNEUMO IGM SER QL: NEGATIVE
MAGNESIUM SERPL-MCNC: 1.6 MG/DL (ref 1.7–2.6)
MCH RBC QN AUTO: 26.8 PG (ref 27–33)
MCHC RBC AUTO-ENTMCNC: 31.5 G/DL (ref 33–37)
MCV RBC AUTO: 85.2 FL (ref 80–94)
METHGB BLD QL: 0.3 % (ref 0–3)
MODALITY: ABNORMAL
MONOCYTES # BLD AUTO: 0.31 10*3/MM3 (ref 0.1–0.9)
MONOCYTES NFR BLD AUTO: 2.8 % (ref 0–12)
MYOGLOBIN SERPL-MCNC: 130 NG/ML (ref 0–109)
MYOGLOBIN SERPL-MCNC: 160 NG/ML (ref 0–109)
MYOGLOBIN SERPL-MCNC: 163 NG/ML (ref 0–109)
NEUTROPHILS # BLD AUTO: 9.97 10*3/MM3 (ref 1.4–6.5)
NEUTROPHILS NFR BLD AUTO: 90.4 % (ref 40–75)
NITRITE UR QL STRIP: NEGATIVE
OSMOLALITY SERPL CALC.SUM OF ELEC: 282.6 MOSM/KG (ref 273–305)
OXYHGB MFR BLDV: 77.8 % (ref 85–100)
PCO2 BLDA: 42.7 MM HG (ref 35–45)
PH BLDA: 7.39 PH UNITS (ref 7.35–7.45)
PH UR STRIP.AUTO: <=5 [PH] (ref 5–8)
PHOSPHATE SERPL-MCNC: 4.1 MG/DL (ref 2.7–4.5)
PLATELET # BLD AUTO: 212 10*3/MM3 (ref 130–400)
PMV BLD AUTO: 10.4 FL (ref 6–10)
PO2 BLDA: 46.5 MM HG (ref 80–100)
POTASSIUM BLD-SCNC: 3.9 MMOL/L (ref 3.5–5.3)
PROT SERPL-MCNC: 6.8 G/DL (ref 6–8)
PROT UR QL STRIP: ABNORMAL
PROTHROMBIN TIME: 14.8 SECONDS (ref 11–15.4)
RBC # BLD AUTO: 4.92 10*6/MM3 (ref 4.2–5.4)
RBC # UR: ABNORMAL /HPF
REF LAB TEST METHOD: ABNORMAL
SAO2 % BLDCOA: 79 % (ref 90–100)
SODIUM BLD-SCNC: 137 MMOL/L (ref 135–153)
SP GR UR STRIP: 1.02 (ref 1–1.03)
SQUAMOUS #/AREA URNS HPF: ABNORMAL /HPF
TRIGL SERPL-MCNC: 117 MG/DL (ref 0–150)
TROPONIN I SERPL-MCNC: 11.44 NG/ML
TROPONIN I SERPL-MCNC: 13.23 NG/ML
TROPONIN I SERPL-MCNC: 5.71 NG/ML
TROPONIN I SERPL-MCNC: 8 NG/ML
TROPONIN I SERPL-MCNC: 8.24 NG/ML
UROBILINOGEN UR QL STRIP: ABNORMAL
VLDLC SERPL-MCNC: 23.4 MG/DL
WBC NRBC COR # BLD: 11.02 10*3/MM3 (ref 4.5–12.5)
WBC UR QL AUTO: ABNORMAL /HPF

## 2017-10-26 PROCEDURE — 84100 ASSAY OF PHOSPHORUS: CPT | Performed by: PHYSICIAN ASSISTANT

## 2017-10-26 PROCEDURE — 99291 CRITICAL CARE FIRST HOUR: CPT | Performed by: INTERNAL MEDICINE

## 2017-10-26 PROCEDURE — 74177 CT ABD & PELVIS W/CONTRAST: CPT

## 2017-10-26 PROCEDURE — 25010000002 ENOXAPARIN PER 10 MG: Performed by: EMERGENCY MEDICINE

## 2017-10-26 PROCEDURE — 83874 ASSAY OF MYOGLOBIN: CPT | Performed by: PHYSICIAN ASSISTANT

## 2017-10-26 PROCEDURE — 85730 THROMBOPLASTIN TIME PARTIAL: CPT | Performed by: EMERGENCY MEDICINE

## 2017-10-26 PROCEDURE — 93010 ELECTROCARDIOGRAM REPORT: CPT | Performed by: INTERNAL MEDICINE

## 2017-10-26 PROCEDURE — 82550 ASSAY OF CK (CPK): CPT | Performed by: PHYSICIAN ASSISTANT

## 2017-10-26 PROCEDURE — 87077 CULTURE AEROBIC IDENTIFY: CPT | Performed by: EMERGENCY MEDICINE

## 2017-10-26 PROCEDURE — 71250 CT THORAX DX C-: CPT

## 2017-10-26 PROCEDURE — 82962 GLUCOSE BLOOD TEST: CPT

## 2017-10-26 PROCEDURE — 84484 ASSAY OF TROPONIN QUANT: CPT | Performed by: EMERGENCY MEDICINE

## 2017-10-26 PROCEDURE — 83036 HEMOGLOBIN GLYCOSYLATED A1C: CPT | Performed by: PHYSICIAN ASSISTANT

## 2017-10-26 PROCEDURE — 85025 COMPLETE CBC W/AUTO DIFF WBC: CPT | Performed by: EMERGENCY MEDICINE

## 2017-10-26 PROCEDURE — 82805 BLOOD GASES W/O2 SATURATION: CPT | Performed by: EMERGENCY MEDICINE

## 2017-10-26 PROCEDURE — 83050 HGB METHEMOGLOBIN QUAN: CPT | Performed by: EMERGENCY MEDICINE

## 2017-10-26 PROCEDURE — 92610 EVALUATE SWALLOWING FUNCTION: CPT | Performed by: SPEECH-LANGUAGE PATHOLOGIST

## 2017-10-26 PROCEDURE — 80053 COMPREHEN METABOLIC PANEL: CPT | Performed by: EMERGENCY MEDICINE

## 2017-10-26 PROCEDURE — G8996 SWALLOW CURRENT STATUS: HCPCS | Performed by: SPEECH-LANGUAGE PATHOLOGIST

## 2017-10-26 PROCEDURE — P9612 CATHETERIZE FOR URINE SPEC: HCPCS

## 2017-10-26 PROCEDURE — G8997 SWALLOW GOAL STATUS: HCPCS | Performed by: SPEECH-LANGUAGE PATHOLOGIST

## 2017-10-26 PROCEDURE — 87186 SC STD MICRODIL/AGAR DIL: CPT | Performed by: EMERGENCY MEDICINE

## 2017-10-26 PROCEDURE — 25010000002 CEFTRIAXONE: Performed by: EMERGENCY MEDICINE

## 2017-10-26 PROCEDURE — 93005 ELECTROCARDIOGRAM TRACING: CPT | Performed by: EMERGENCY MEDICINE

## 2017-10-26 PROCEDURE — 25010000002 ENOXAPARIN PER 10 MG: Performed by: INTERNAL MEDICINE

## 2017-10-26 PROCEDURE — 93005 ELECTROCARDIOGRAM TRACING: CPT | Performed by: HOSPITALIST

## 2017-10-26 PROCEDURE — 74177 CT ABD & PELVIS W/CONTRAST: CPT | Performed by: RADIOLOGY

## 2017-10-26 PROCEDURE — 85379 FIBRIN DEGRADATION QUANT: CPT | Performed by: EMERGENCY MEDICINE

## 2017-10-26 PROCEDURE — 0 IOPAMIDOL 61 % SOLUTION: Performed by: EMERGENCY MEDICINE

## 2017-10-26 PROCEDURE — 87899 AGENT NOS ASSAY W/OPTIC: CPT | Performed by: NURSE PRACTITIONER

## 2017-10-26 PROCEDURE — 82550 ASSAY OF CK (CPK): CPT | Performed by: EMERGENCY MEDICINE

## 2017-10-26 PROCEDURE — 82553 CREATINE MB FRACTION: CPT | Performed by: EMERGENCY MEDICINE

## 2017-10-26 PROCEDURE — 63710000001 INSULIN ASPART PER 5 UNITS: Performed by: PHYSICIAN ASSISTANT

## 2017-10-26 PROCEDURE — 83735 ASSAY OF MAGNESIUM: CPT | Performed by: PHYSICIAN ASSISTANT

## 2017-10-26 PROCEDURE — 36600 WITHDRAWAL OF ARTERIAL BLOOD: CPT | Performed by: EMERGENCY MEDICINE

## 2017-10-26 PROCEDURE — 82375 ASSAY CARBOXYHB QUANT: CPT | Performed by: EMERGENCY MEDICINE

## 2017-10-26 PROCEDURE — 93306 TTE W/DOPPLER COMPLETE: CPT

## 2017-10-26 PROCEDURE — 87086 URINE CULTURE/COLONY COUNT: CPT | Performed by: EMERGENCY MEDICINE

## 2017-10-26 PROCEDURE — 99285 EMERGENCY DEPT VISIT HI MDM: CPT

## 2017-10-26 PROCEDURE — 84484 ASSAY OF TROPONIN QUANT: CPT | Performed by: HOSPITALIST

## 2017-10-26 PROCEDURE — 25010000002 MAGNESIUM SULFATE 2 GM/50ML SOLUTION: Performed by: INTERNAL MEDICINE

## 2017-10-26 PROCEDURE — 83690 ASSAY OF LIPASE: CPT | Performed by: EMERGENCY MEDICINE

## 2017-10-26 PROCEDURE — 83880 ASSAY OF NATRIURETIC PEPTIDE: CPT | Performed by: EMERGENCY MEDICINE

## 2017-10-26 PROCEDURE — 81001 URINALYSIS AUTO W/SCOPE: CPT | Performed by: EMERGENCY MEDICINE

## 2017-10-26 PROCEDURE — 80061 LIPID PANEL: CPT | Performed by: EMERGENCY MEDICINE

## 2017-10-26 PROCEDURE — 82553 CREATINE MB FRACTION: CPT | Performed by: PHYSICIAN ASSISTANT

## 2017-10-26 PROCEDURE — 99223 1ST HOSP IP/OBS HIGH 75: CPT | Performed by: HOSPITALIST

## 2017-10-26 PROCEDURE — 94799 UNLISTED PULMONARY SVC/PX: CPT

## 2017-10-26 PROCEDURE — 71250 CT THORAX DX C-: CPT | Performed by: RADIOLOGY

## 2017-10-26 PROCEDURE — 83605 ASSAY OF LACTIC ACID: CPT | Performed by: EMERGENCY MEDICINE

## 2017-10-26 PROCEDURE — 36415 COLL VENOUS BLD VENIPUNCTURE: CPT

## 2017-10-26 PROCEDURE — 63710000001 INSULIN DETEMIR PER 5 UNITS: Performed by: PHYSICIAN ASSISTANT

## 2017-10-26 PROCEDURE — 71010 HC CHEST PA OR AP: CPT

## 2017-10-26 PROCEDURE — 99222 1ST HOSP IP/OBS MODERATE 55: CPT | Performed by: INTERNAL MEDICINE

## 2017-10-26 PROCEDURE — 71010 XR CHEST 1 VW: CPT | Performed by: RADIOLOGY

## 2017-10-26 PROCEDURE — 87040 BLOOD CULTURE FOR BACTERIA: CPT | Performed by: EMERGENCY MEDICINE

## 2017-10-26 PROCEDURE — 93306 TTE W/DOPPLER COMPLETE: CPT | Performed by: INTERNAL MEDICINE

## 2017-10-26 PROCEDURE — 85610 PROTHROMBIN TIME: CPT | Performed by: EMERGENCY MEDICINE

## 2017-10-26 PROCEDURE — 87804 INFLUENZA ASSAY W/OPTIC: CPT | Performed by: EMERGENCY MEDICINE

## 2017-10-26 RX ORDER — NICOTINE POLACRILEX 4 MG
15 LOZENGE BUCCAL
Status: DISCONTINUED | OUTPATIENT
Start: 2017-10-26 | End: 2017-11-06 | Stop reason: HOSPADM

## 2017-10-26 RX ORDER — PANTOPRAZOLE SODIUM 40 MG/1
40 TABLET, DELAYED RELEASE ORAL EVERY MORNING
Status: DISCONTINUED | OUTPATIENT
Start: 2017-10-26 | End: 2017-11-06 | Stop reason: HOSPADM

## 2017-10-26 RX ORDER — ACETAMINOPHEN 650 MG/1
650 SUPPOSITORY RECTAL ONCE
Status: COMPLETED | OUTPATIENT
Start: 2017-10-26 | End: 2017-10-26

## 2017-10-26 RX ORDER — MAGNESIUM SULFATE HEPTAHYDRATE 40 MG/ML
2 INJECTION, SOLUTION INTRAVENOUS ONCE
Status: COMPLETED | OUTPATIENT
Start: 2017-10-26 | End: 2017-10-26

## 2017-10-26 RX ORDER — ATORVASTATIN CALCIUM 40 MG/1
80 TABLET, FILM COATED ORAL NIGHTLY
Status: DISCONTINUED | OUTPATIENT
Start: 2017-10-26 | End: 2017-11-06 | Stop reason: HOSPADM

## 2017-10-26 RX ORDER — ASPIRIN 81 MG/1
324 TABLET, CHEWABLE ORAL ONCE
Status: CANCELLED | OUTPATIENT
Start: 2017-10-26 | End: 2017-10-26

## 2017-10-26 RX ORDER — ASPIRIN 300 MG/1
300 SUPPOSITORY RECTAL ONCE
Status: COMPLETED | OUTPATIENT
Start: 2017-10-26 | End: 2017-10-26

## 2017-10-26 RX ORDER — L.ACID,CASEI/B.ANIMAL/S.THERMO 16B CELL
1 CAPSULE ORAL DAILY
Status: DISCONTINUED | OUTPATIENT
Start: 2017-10-26 | End: 2017-11-06 | Stop reason: HOSPADM

## 2017-10-26 RX ORDER — VALSARTAN 320 MG/1
320 TABLET ORAL DAILY
COMMUNITY
End: 2017-11-06 | Stop reason: HOSPADM

## 2017-10-26 RX ORDER — CITALOPRAM 20 MG/1
20 TABLET ORAL DAILY
Status: CANCELLED | OUTPATIENT
Start: 2017-10-26

## 2017-10-26 RX ORDER — RIVASTIGMINE 9.5 MG/24H
1 PATCH, EXTENDED RELEASE TRANSDERMAL DAILY
Status: DISCONTINUED | OUTPATIENT
Start: 2017-10-26 | End: 2017-11-06 | Stop reason: HOSPADM

## 2017-10-26 RX ORDER — CLOPIDOGREL BISULFATE 75 MG/1
75 TABLET ORAL DAILY
Status: DISCONTINUED | OUTPATIENT
Start: 2017-10-27 | End: 2017-11-06 | Stop reason: HOSPADM

## 2017-10-26 RX ORDER — INSULIN GLARGINE 100 [IU]/ML
12 INJECTION, SOLUTION SUBCUTANEOUS DAILY
COMMUNITY
End: 2017-11-06 | Stop reason: HOSPADM

## 2017-10-26 RX ORDER — VALSARTAN 160 MG/1
320 TABLET ORAL DAILY
Status: CANCELLED | OUTPATIENT
Start: 2017-10-26

## 2017-10-26 RX ORDER — ACETAMINOPHEN 325 MG/1
650 TABLET ORAL EVERY 4 HOURS PRN
Status: DISCONTINUED | OUTPATIENT
Start: 2017-10-26 | End: 2017-11-06 | Stop reason: HOSPADM

## 2017-10-26 RX ORDER — ACETAMINOPHEN 650 MG/1
SUPPOSITORY RECTAL
Status: COMPLETED
Start: 2017-10-26 | End: 2017-10-26

## 2017-10-26 RX ORDER — MAGNESIUM SULFATE HEPTAHYDRATE 40 MG/ML
2 INJECTION, SOLUTION INTRAVENOUS AS NEEDED
Status: DISCONTINUED | OUTPATIENT
Start: 2017-10-26 | End: 2017-11-01

## 2017-10-26 RX ORDER — ASPIRIN 81 MG/1
81 TABLET ORAL DAILY
Status: CANCELLED | OUTPATIENT
Start: 2017-10-27

## 2017-10-26 RX ORDER — CLOPIDOGREL BISULFATE 75 MG/1
300 TABLET ORAL ONCE
Status: COMPLETED | OUTPATIENT
Start: 2017-10-26 | End: 2017-10-26

## 2017-10-26 RX ORDER — CETIRIZINE HYDROCHLORIDE 10 MG/1
5 TABLET ORAL DAILY
Status: DISCONTINUED | OUTPATIENT
Start: 2017-10-26 | End: 2017-11-06 | Stop reason: HOSPADM

## 2017-10-26 RX ORDER — METOPROLOL TARTRATE 5 MG/5ML
5 INJECTION INTRAVENOUS ONCE
Status: COMPLETED | OUTPATIENT
Start: 2017-10-26 | End: 2017-10-26

## 2017-10-26 RX ORDER — FERROUS SULFATE 325(65) MG
325 TABLET ORAL
COMMUNITY

## 2017-10-26 RX ORDER — NITROFURANTOIN MACROCRYSTALS 50 MG/1
50 CAPSULE ORAL DAILY
Status: CANCELLED | OUTPATIENT
Start: 2017-10-26

## 2017-10-26 RX ORDER — FERROUS SULFATE 325(65) MG
325 TABLET ORAL
Status: DISCONTINUED | OUTPATIENT
Start: 2017-10-26 | End: 2017-11-06 | Stop reason: HOSPADM

## 2017-10-26 RX ORDER — SODIUM CHLORIDE 9 MG/ML
40 INJECTION, SOLUTION INTRAVENOUS CONTINUOUS
Status: DISCONTINUED | OUTPATIENT
Start: 2017-10-26 | End: 2017-10-28

## 2017-10-26 RX ORDER — ACETAMINOPHEN 325 MG/1
1000 TABLET ORAL ONCE
Status: DISCONTINUED | OUTPATIENT
Start: 2017-10-26 | End: 2017-10-26

## 2017-10-26 RX ORDER — ASPIRIN 81 MG/1
81 TABLET, CHEWABLE ORAL DAILY
Status: DISCONTINUED | OUTPATIENT
Start: 2017-10-27 | End: 2017-10-29

## 2017-10-26 RX ORDER — SODIUM CHLORIDE 0.9 % (FLUSH) 0.9 %
10 SYRINGE (ML) INJECTION AS NEEDED
Status: DISCONTINUED | OUTPATIENT
Start: 2017-10-26 | End: 2017-11-06 | Stop reason: HOSPADM

## 2017-10-26 RX ORDER — DEXTROSE MONOHYDRATE 25 G/50ML
25 INJECTION, SOLUTION INTRAVENOUS
Status: DISCONTINUED | OUTPATIENT
Start: 2017-10-26 | End: 2017-11-06 | Stop reason: HOSPADM

## 2017-10-26 RX ORDER — MAGNESIUM SULFATE 1 G/100ML
1 INJECTION INTRAVENOUS AS NEEDED
Status: DISCONTINUED | OUTPATIENT
Start: 2017-10-26 | End: 2017-11-01

## 2017-10-26 RX ORDER — SODIUM CHLORIDE 0.9 % (FLUSH) 0.9 %
1-10 SYRINGE (ML) INJECTION AS NEEDED
Status: DISCONTINUED | OUTPATIENT
Start: 2017-10-26 | End: 2017-11-06 | Stop reason: HOSPADM

## 2017-10-26 RX ADMIN — ACETAMINOPHEN 650 MG: 650 SUPPOSITORY RECTAL at 02:46

## 2017-10-26 RX ADMIN — ATORVASTATIN CALCIUM 80 MG: 40 TABLET, FILM COATED ORAL at 20:02

## 2017-10-26 RX ADMIN — PANTOPRAZOLE SODIUM 40 MG: 40 TABLET, DELAYED RELEASE ORAL at 13:38

## 2017-10-26 RX ADMIN — CETIRIZINE HYDROCHLORIDE 5 MG: 10 TABLET ORAL at 13:38

## 2017-10-26 RX ADMIN — CLOPIDOGREL 300 MG: 75 TABLET, FILM COATED ORAL at 06:52

## 2017-10-26 RX ADMIN — ENOXAPARIN SODIUM 80 MG: 80 INJECTION SUBCUTANEOUS at 06:45

## 2017-10-26 RX ADMIN — Medication 1 CAPSULE: at 13:38

## 2017-10-26 RX ADMIN — MAGNESIUM SULFATE IN WATER 2 G: 40 INJECTION, SOLUTION INTRAVENOUS at 13:38

## 2017-10-26 RX ADMIN — INSULIN ASPART 2 UNITS: 100 INJECTION, SOLUTION INTRAVENOUS; SUBCUTANEOUS at 20:02

## 2017-10-26 RX ADMIN — INSULIN DETEMIR 12 UNITS: 100 INJECTION, SOLUTION SUBCUTANEOUS at 20:03

## 2017-10-26 RX ADMIN — IOPAMIDOL 100 ML: 612 INJECTION, SOLUTION INTRAVENOUS at 04:37

## 2017-10-26 RX ADMIN — SODIUM CHLORIDE 125 ML/HR: 9 INJECTION, SOLUTION INTRAVENOUS at 04:23

## 2017-10-26 RX ADMIN — RIVASTIGMINE 1 PATCH: 9.5 PATCH, EXTENDED RELEASE TRANSDERMAL at 13:38

## 2017-10-26 RX ADMIN — CEFTRIAXONE 1 G: 1 INJECTION, POWDER, FOR SOLUTION INTRAMUSCULAR; INTRAVENOUS at 03:22

## 2017-10-26 RX ADMIN — METRONIDAZOLE 500 MG: 500 INJECTION, SOLUTION INTRAVENOUS at 13:37

## 2017-10-26 RX ADMIN — ENOXAPARIN SODIUM 80 MG: 80 INJECTION SUBCUTANEOUS at 17:42

## 2017-10-26 RX ADMIN — SODIUM CHLORIDE 500 ML: 9 INJECTION, SOLUTION INTRAVENOUS at 03:21

## 2017-10-26 RX ADMIN — INSULIN ASPART 3 UNITS: 100 INJECTION, SOLUTION INTRAVENOUS; SUBCUTANEOUS at 13:33

## 2017-10-26 RX ADMIN — METRONIDAZOLE 500 MG: 500 INJECTION, SOLUTION INTRAVENOUS at 17:43

## 2017-10-26 RX ADMIN — SODIUM CHLORIDE 125 ML/HR: 9 INJECTION, SOLUTION INTRAVENOUS at 20:34

## 2017-10-26 RX ADMIN — INSULIN ASPART 4 UNITS: 100 INJECTION, SOLUTION INTRAVENOUS; SUBCUTANEOUS at 17:43

## 2017-10-26 RX ADMIN — ASPIRIN 300 MG: 300 SUPPOSITORY RECTAL at 06:53

## 2017-10-26 RX ADMIN — METOROPROLOL TARTRATE 1 MG: 5 INJECTION, SOLUTION INTRAVENOUS at 06:44

## 2017-10-27 LAB
ANION GAP SERPL CALCULATED.3IONS-SCNC: 5.2 MMOL/L (ref 3.6–11.2)
BASOPHILS # BLD AUTO: 0.02 10*3/MM3 (ref 0–0.3)
BASOPHILS NFR BLD AUTO: 0.3 % (ref 0–2)
BH CV ECHO MEAS - ACS: 1.3 CM
BH CV ECHO MEAS - AO MAX PG (FULL): 2.8 MMHG
BH CV ECHO MEAS - AO MAX PG: 6.9 MMHG
BH CV ECHO MEAS - AO MEAN PG (FULL): 1.8 MMHG
BH CV ECHO MEAS - AO MEAN PG: 4.2 MMHG
BH CV ECHO MEAS - AO ROOT AREA (BSA CORRECTED): 1.4
BH CV ECHO MEAS - AO ROOT AREA: 4.7 CM^2
BH CV ECHO MEAS - AO ROOT DIAM: 2.5 CM
BH CV ECHO MEAS - AO V2 MAX: 131 CM/SEC
BH CV ECHO MEAS - AO V2 MEAN: 99.8 CM/SEC
BH CV ECHO MEAS - AO V2 VTI: 20.7 CM
BH CV ECHO MEAS - BSA(HAYCOCK): 1.9 M^2
BH CV ECHO MEAS - BSA: 1.8 M^2
BH CV ECHO MEAS - BZI_BMI: 37.3 KILOGRAMS/M^2
BH CV ECHO MEAS - BZI_METRIC_HEIGHT: 150 CM
BH CV ECHO MEAS - BZI_METRIC_WEIGHT: 84 KG
BH CV ECHO MEAS - EDV(CUBED): 123.1 ML
BH CV ECHO MEAS - EDV(MOD-SP4): 30.5 ML
BH CV ECHO MEAS - EDV(TEICH): 116.9 ML
BH CV ECHO MEAS - EF(CUBED): 68 %
BH CV ECHO MEAS - EF(TEICH): 59.4 %
BH CV ECHO MEAS - ESV(CUBED): 39.4 ML
BH CV ECHO MEAS - ESV(TEICH): 47.5 ML
BH CV ECHO MEAS - FS: 31.6 %
BH CV ECHO MEAS - IVS/LVPW: 1.2
BH CV ECHO MEAS - IVSD: 1.1 CM
BH CV ECHO MEAS - LA DIMENSION: 3.7 CM
BH CV ECHO MEAS - LA/AO: 1.5
BH CV ECHO MEAS - LV DIASTOLIC VOL/BSA (35-75): 17.1 ML/M^2
BH CV ECHO MEAS - LV MASS(C)D: 183.5 GRAMS
BH CV ECHO MEAS - LV MASS(C)DI: 102.7 GRAMS/M^2
BH CV ECHO MEAS - LV MAX PG: 4.1 MMHG
BH CV ECHO MEAS - LV MEAN PG: 2.4 MMHG
BH CV ECHO MEAS - LV V1 MAX: 101 CM/SEC
BH CV ECHO MEAS - LV V1 MEAN: 74.7 CM/SEC
BH CV ECHO MEAS - LV V1 VTI: 17.8 CM
BH CV ECHO MEAS - LVIDD: 5 CM
BH CV ECHO MEAS - LVIDS: 3.4 CM
BH CV ECHO MEAS - LVPWD: 0.93 CM
BH CV ECHO MEAS - MV A MAX VEL: 94.8 CM/SEC
BH CV ECHO MEAS - MV E MAX VEL: 65.4 CM/SEC
BH CV ECHO MEAS - MV E/A: 0.69
BH CV ECHO MEAS - PA ACC TIME: 0.08 SEC
BH CV ECHO MEAS - PA PR(ACCEL): 42.6 MMHG
BH CV ECHO MEAS - RAP SYSTOLE: 10 MMHG
BH CV ECHO MEAS - RVDD: 2.7 CM
BH CV ECHO MEAS - RVSP: 44.4 MMHG
BH CV ECHO MEAS - SI(AO): 54.8 ML/M^2
BH CV ECHO MEAS - SI(CUBED): 46.9 ML/M^2
BH CV ECHO MEAS - SI(TEICH): 38.8 ML/M^2
BH CV ECHO MEAS - SV(AO): 97.9 ML
BH CV ECHO MEAS - SV(CUBED): 83.8 ML
BH CV ECHO MEAS - SV(TEICH): 69.4 ML
BH CV ECHO MEAS - TR MAX VEL: 293.1 CM/SEC
BUN BLD-MCNC: 14 MG/DL (ref 7–21)
BUN/CREAT SERPL: 19.7 (ref 7–25)
CALCIUM SPEC-SCNC: 8.2 MG/DL (ref 7.7–10)
CHLORIDE SERPL-SCNC: 106 MMOL/L (ref 99–112)
CK MB SERPL-CCNC: 26.32 NG/ML (ref 0–5)
CK MB SERPL-RTO: 12.5 % (ref 0–3)
CK SERPL-CCNC: 211 U/L (ref 24–173)
CO2 SERPL-SCNC: 25.8 MMOL/L (ref 24.3–31.9)
CREAT BLD-MCNC: 0.71 MG/DL (ref 0.43–1.29)
CRP SERPL-MCNC: 9.27 MG/DL (ref 0–0.99)
DEPRECATED RDW RBC AUTO: 60.2 FL (ref 37–54)
EOSINOPHIL # BLD AUTO: 0.01 10*3/MM3 (ref 0–0.7)
EOSINOPHIL NFR BLD AUTO: 0.1 % (ref 0–7)
ERYTHROCYTE [DISTWIDTH] IN BLOOD BY AUTOMATED COUNT: 19 % (ref 11.5–14.5)
GFR SERPL CREATININE-BSD FRML MDRD: 79 ML/MIN/1.73
GLUCOSE BLD-MCNC: 159 MG/DL (ref 70–110)
GLUCOSE BLDC GLUCOMTR-MCNC: 137 MG/DL (ref 70–130)
GLUCOSE BLDC GLUCOMTR-MCNC: 145 MG/DL (ref 70–130)
GLUCOSE BLDC GLUCOMTR-MCNC: 155 MG/DL (ref 70–130)
GLUCOSE BLDC GLUCOMTR-MCNC: 237 MG/DL (ref 70–130)
HCT VFR BLD AUTO: 37.4 % (ref 37–47)
HGB BLD-MCNC: 11.4 G/DL (ref 12–16)
IMM GRANULOCYTES # BLD: 0.02 10*3/MM3 (ref 0–0.03)
IMM GRANULOCYTES NFR BLD: 0.3 % (ref 0–0.5)
LV EF 2D ECHO EST: 40 %
LYMPHOCYTES # BLD AUTO: 1.24 10*3/MM3 (ref 1–3)
LYMPHOCYTES NFR BLD AUTO: 16.6 % (ref 16–46)
M PNEUMO IGM SER QL: NEGATIVE
MAGNESIUM SERPL-MCNC: 2.2 MG/DL (ref 1.7–2.6)
MCH RBC QN AUTO: 26.7 PG (ref 27–33)
MCHC RBC AUTO-ENTMCNC: 30.5 G/DL (ref 33–37)
MCV RBC AUTO: 87.6 FL (ref 80–94)
MONOCYTES # BLD AUTO: 0.97 10*3/MM3 (ref 0.1–0.9)
MONOCYTES NFR BLD AUTO: 13 % (ref 0–12)
NEUTROPHILS # BLD AUTO: 5.19 10*3/MM3 (ref 1.4–6.5)
NEUTROPHILS NFR BLD AUTO: 69.7 % (ref 40–75)
OSMOLALITY SERPL CALC.SUM OF ELEC: 277.7 MOSM/KG (ref 273–305)
PHOSPHATE SERPL-MCNC: 3.2 MG/DL (ref 2.7–4.5)
PLATELET # BLD AUTO: 147 10*3/MM3 (ref 130–400)
PMV BLD AUTO: 10.5 FL (ref 6–10)
POTASSIUM BLD-SCNC: 4.2 MMOL/L (ref 3.5–5.3)
RBC # BLD AUTO: 4.27 10*6/MM3 (ref 4.2–5.4)
SODIUM BLD-SCNC: 137 MMOL/L (ref 135–153)
TROPONIN I SERPL-MCNC: 16.02 NG/ML
TSH SERPL DL<=0.05 MIU/L-ACNC: 0.67 MIU/ML (ref 0.55–4.78)
WBC NRBC COR # BLD: 7.45 10*3/MM3 (ref 4.5–12.5)

## 2017-10-27 PROCEDURE — 84443 ASSAY THYROID STIM HORMONE: CPT | Performed by: PHYSICIAN ASSISTANT

## 2017-10-27 PROCEDURE — 99233 SBSQ HOSP IP/OBS HIGH 50: CPT | Performed by: INTERNAL MEDICINE

## 2017-10-27 PROCEDURE — 63710000001 INSULIN ASPART PER 5 UNITS: Performed by: PHYSICIAN ASSISTANT

## 2017-10-27 PROCEDURE — 84100 ASSAY OF PHOSPHORUS: CPT | Performed by: PHYSICIAN ASSISTANT

## 2017-10-27 PROCEDURE — 94799 UNLISTED PULMONARY SVC/PX: CPT

## 2017-10-27 PROCEDURE — 85025 COMPLETE CBC W/AUTO DIFF WBC: CPT | Performed by: INTERNAL MEDICINE

## 2017-10-27 PROCEDURE — 82553 CREATINE MB FRACTION: CPT | Performed by: INTERNAL MEDICINE

## 2017-10-27 PROCEDURE — 83735 ASSAY OF MAGNESIUM: CPT | Performed by: PHYSICIAN ASSISTANT

## 2017-10-27 PROCEDURE — 99232 SBSQ HOSP IP/OBS MODERATE 35: CPT | Performed by: INTERNAL MEDICINE

## 2017-10-27 PROCEDURE — 25010000002 CEFTRIAXONE: Performed by: PHYSICIAN ASSISTANT

## 2017-10-27 PROCEDURE — 25010000002 ENOXAPARIN PER 10 MG: Performed by: INTERNAL MEDICINE

## 2017-10-27 PROCEDURE — 63710000001 INSULIN DETEMIR PER 5 UNITS: Performed by: PHYSICIAN ASSISTANT

## 2017-10-27 PROCEDURE — 86140 C-REACTIVE PROTEIN: CPT | Performed by: PHYSICIAN ASSISTANT

## 2017-10-27 PROCEDURE — 80048 BASIC METABOLIC PNL TOTAL CA: CPT | Performed by: INTERNAL MEDICINE

## 2017-10-27 PROCEDURE — 82550 ASSAY OF CK (CPK): CPT | Performed by: INTERNAL MEDICINE

## 2017-10-27 PROCEDURE — 82962 GLUCOSE BLOOD TEST: CPT

## 2017-10-27 PROCEDURE — 84484 ASSAY OF TROPONIN QUANT: CPT | Performed by: INTERNAL MEDICINE

## 2017-10-27 PROCEDURE — 99233 SBSQ HOSP IP/OBS HIGH 50: CPT | Performed by: HOSPITALIST

## 2017-10-27 RX ORDER — CARVEDILOL 3.12 MG/1
3.12 TABLET ORAL 2 TIMES DAILY WITH MEALS
Status: DISCONTINUED | OUTPATIENT
Start: 2017-10-27 | End: 2017-10-29

## 2017-10-27 RX ORDER — CARVEDILOL 3.12 MG/1
3.12 TABLET ORAL EVERY 12 HOURS SCHEDULED
Status: DISCONTINUED | OUTPATIENT
Start: 2017-10-27 | End: 2017-10-27 | Stop reason: SDUPTHER

## 2017-10-27 RX ORDER — IPRATROPIUM BROMIDE AND ALBUTEROL SULFATE 2.5; .5 MG/3ML; MG/3ML
3 SOLUTION RESPIRATORY (INHALATION)
Status: DISCONTINUED | OUTPATIENT
Start: 2017-10-27 | End: 2017-10-28

## 2017-10-27 RX ORDER — IPRATROPIUM BROMIDE AND ALBUTEROL SULFATE 2.5; .5 MG/3ML; MG/3ML
3 SOLUTION RESPIRATORY (INHALATION) EVERY 6 HOURS PRN
Status: DISCONTINUED | OUTPATIENT
Start: 2017-10-27 | End: 2017-11-04

## 2017-10-27 RX ADMIN — FERROUS SULFATE TAB 325 MG (65 MG ELEMENTAL FE) 325 MG: 325 (65 FE) TAB at 09:39

## 2017-10-27 RX ADMIN — CEFTRIAXONE 1 G: 1 INJECTION, POWDER, FOR SOLUTION INTRAMUSCULAR; INTRAVENOUS at 05:46

## 2017-10-27 RX ADMIN — ENOXAPARIN SODIUM 80 MG: 80 INJECTION SUBCUTANEOUS at 05:46

## 2017-10-27 RX ADMIN — CLOPIDOGREL 75 MG: 75 TABLET, FILM COATED ORAL at 09:39

## 2017-10-27 RX ADMIN — CARVEDILOL 3.12 MG: 3.12 TABLET, FILM COATED ORAL at 21:49

## 2017-10-27 RX ADMIN — INSULIN ASPART 3 UNITS: 100 INJECTION, SOLUTION INTRAVENOUS; SUBCUTANEOUS at 21:49

## 2017-10-27 RX ADMIN — PANTOPRAZOLE SODIUM 40 MG: 40 TABLET, DELAYED RELEASE ORAL at 06:00

## 2017-10-27 RX ADMIN — INSULIN ASPART 2 UNITS: 100 INJECTION, SOLUTION INTRAVENOUS; SUBCUTANEOUS at 09:38

## 2017-10-27 RX ADMIN — ATORVASTATIN CALCIUM 80 MG: 40 TABLET, FILM COATED ORAL at 21:49

## 2017-10-27 RX ADMIN — IPRATROPIUM BROMIDE AND ALBUTEROL SULFATE 3 ML: .5; 3 SOLUTION RESPIRATORY (INHALATION) at 19:33

## 2017-10-27 RX ADMIN — RIVASTIGMINE 1 PATCH: 9.5 PATCH, EXTENDED RELEASE TRANSDERMAL at 09:40

## 2017-10-27 RX ADMIN — Medication 1 CAPSULE: at 09:39

## 2017-10-27 RX ADMIN — SODIUM CHLORIDE 75 ML/HR: 9 INJECTION, SOLUTION INTRAVENOUS at 11:34

## 2017-10-27 RX ADMIN — METRONIDAZOLE 500 MG: 500 INJECTION, SOLUTION INTRAVENOUS at 03:23

## 2017-10-27 RX ADMIN — INSULIN DETEMIR 12 UNITS: 100 INJECTION, SOLUTION SUBCUTANEOUS at 21:50

## 2017-10-27 RX ADMIN — CETIRIZINE HYDROCHLORIDE 5 MG: 10 TABLET ORAL at 09:39

## 2017-10-27 RX ADMIN — ASPIRIN 81 MG: 81 TABLET, CHEWABLE ORAL at 09:39

## 2017-10-28 ENCOUNTER — APPOINTMENT (OUTPATIENT)
Dept: GENERAL RADIOLOGY | Facility: HOSPITAL | Age: 82
End: 2017-10-28

## 2017-10-28 LAB
ANION GAP SERPL CALCULATED.3IONS-SCNC: 3.3 MMOL/L (ref 3.6–11.2)
BACTERIA SPEC AEROBE CULT: ABNORMAL
BACTERIA SPEC AEROBE CULT: ABNORMAL
BACTERIA UR QL AUTO: ABNORMAL /HPF
BASOPHILS # BLD AUTO: 0.03 10*3/MM3 (ref 0–0.3)
BASOPHILS NFR BLD AUTO: 0.4 % (ref 0–2)
BILIRUB UR QL STRIP: NEGATIVE
BNP SERPL-MCNC: 720 PG/ML (ref 0–100)
BUN BLD-MCNC: 13 MG/DL (ref 7–21)
BUN/CREAT SERPL: 18.8 (ref 7–25)
CALCIUM SPEC-SCNC: 7.9 MG/DL (ref 7.7–10)
CHLORIDE SERPL-SCNC: 108 MMOL/L (ref 99–112)
CK MB SERPL-CCNC: 11.08 NG/ML (ref 0–5)
CK MB SERPL-RTO: 11.5 % (ref 0–3)
CK SERPL-CCNC: 96 U/L (ref 24–173)
CLARITY UR: CLEAR
CO2 SERPL-SCNC: 26.7 MMOL/L (ref 24.3–31.9)
COLOR UR: ABNORMAL
CREAT BLD-MCNC: 0.69 MG/DL (ref 0.43–1.29)
CRP SERPL-MCNC: 8.38 MG/DL (ref 0–0.99)
DEPRECATED RDW RBC AUTO: 59 FL (ref 37–54)
EOSINOPHIL # BLD AUTO: 0.02 10*3/MM3 (ref 0–0.7)
EOSINOPHIL NFR BLD AUTO: 0.3 % (ref 0–7)
ERYTHROCYTE [DISTWIDTH] IN BLOOD BY AUTOMATED COUNT: 18.7 % (ref 11.5–14.5)
GFR SERPL CREATININE-BSD FRML MDRD: 81 ML/MIN/1.73
GLUCOSE BLD-MCNC: 180 MG/DL (ref 70–110)
GLUCOSE BLDC GLUCOMTR-MCNC: 158 MG/DL (ref 70–130)
GLUCOSE BLDC GLUCOMTR-MCNC: 175 MG/DL (ref 70–130)
GLUCOSE BLDC GLUCOMTR-MCNC: 187 MG/DL (ref 70–130)
GLUCOSE BLDC GLUCOMTR-MCNC: 223 MG/DL (ref 70–130)
GLUCOSE UR STRIP-MCNC: NEGATIVE MG/DL
HCT VFR BLD AUTO: 35.4 % (ref 37–47)
HGB BLD-MCNC: 10.9 G/DL (ref 12–16)
HGB UR QL STRIP.AUTO: NEGATIVE
HYALINE CASTS UR QL AUTO: ABNORMAL /LPF
IMM GRANULOCYTES # BLD: 0.02 10*3/MM3 (ref 0–0.03)
IMM GRANULOCYTES NFR BLD: 0.3 % (ref 0–0.5)
KETONES UR QL STRIP: ABNORMAL
LEUKOCYTE ESTERASE UR QL STRIP.AUTO: ABNORMAL
LYMPHOCYTES # BLD AUTO: 1.24 10*3/MM3 (ref 1–3)
LYMPHOCYTES NFR BLD AUTO: 16.7 % (ref 16–46)
MAGNESIUM SERPL-MCNC: 1.9 MG/DL (ref 1.7–2.6)
MCH RBC QN AUTO: 27.3 PG (ref 27–33)
MCHC RBC AUTO-ENTMCNC: 30.8 G/DL (ref 33–37)
MCV RBC AUTO: 88.5 FL (ref 80–94)
MONOCYTES # BLD AUTO: 1.04 10*3/MM3 (ref 0.1–0.9)
MONOCYTES NFR BLD AUTO: 14 % (ref 0–12)
NEUTROPHILS # BLD AUTO: 5.06 10*3/MM3 (ref 1.4–6.5)
NEUTROPHILS NFR BLD AUTO: 68.3 % (ref 40–75)
NITRITE UR QL STRIP: NEGATIVE
OSMOLALITY SERPL CALC.SUM OF ELEC: 280.3 MOSM/KG (ref 273–305)
PH UR STRIP.AUTO: <=5 [PH] (ref 5–8)
PHOSPHATE SERPL-MCNC: 2.4 MG/DL (ref 2.7–4.5)
PLATELET # BLD AUTO: 152 10*3/MM3 (ref 130–400)
PMV BLD AUTO: 10.6 FL (ref 6–10)
POTASSIUM BLD-SCNC: 4.1 MMOL/L (ref 3.5–5.3)
PROT UR QL STRIP: NEGATIVE
RBC # BLD AUTO: 4 10*6/MM3 (ref 4.2–5.4)
RBC # UR: ABNORMAL /HPF
REF LAB TEST METHOD: ABNORMAL
SODIUM BLD-SCNC: 138 MMOL/L (ref 135–153)
SP GR UR STRIP: 1.02 (ref 1–1.03)
SQUAMOUS #/AREA URNS HPF: ABNORMAL /HPF
TROPONIN I SERPL-MCNC: 8.08 NG/ML
TSH SERPL DL<=0.05 MIU/L-ACNC: 2.21 MIU/ML (ref 0.55–4.78)
UROBILINOGEN UR QL STRIP: ABNORMAL
WBC NRBC COR # BLD: 7.41 10*3/MM3 (ref 4.5–12.5)
WBC UR QL AUTO: ABNORMAL /HPF

## 2017-10-28 PROCEDURE — 80048 BASIC METABOLIC PNL TOTAL CA: CPT | Performed by: HOSPITALIST

## 2017-10-28 PROCEDURE — 99232 SBSQ HOSP IP/OBS MODERATE 35: CPT | Performed by: INTERNAL MEDICINE

## 2017-10-28 PROCEDURE — 99233 SBSQ HOSP IP/OBS HIGH 50: CPT | Performed by: HOSPITALIST

## 2017-10-28 PROCEDURE — 94799 UNLISTED PULMONARY SVC/PX: CPT

## 2017-10-28 PROCEDURE — 25010000002 DIGOXIN PER 500 MCG: Performed by: INTERNAL MEDICINE

## 2017-10-28 PROCEDURE — 25010000002 AMIODARONE IN DEXTROSE 5% 360-4.14 MG/200ML-% SOLUTION: Performed by: INTERNAL MEDICINE

## 2017-10-28 PROCEDURE — 82550 ASSAY OF CK (CPK): CPT | Performed by: HOSPITALIST

## 2017-10-28 PROCEDURE — 25010000002 MEROPENEM: Performed by: HOSPITALIST

## 2017-10-28 PROCEDURE — 87040 BLOOD CULTURE FOR BACTERIA: CPT | Performed by: INTERNAL MEDICINE

## 2017-10-28 PROCEDURE — 84100 ASSAY OF PHOSPHORUS: CPT | Performed by: PHYSICIAN ASSISTANT

## 2017-10-28 PROCEDURE — 71010 HC CHEST PA OR AP: CPT

## 2017-10-28 PROCEDURE — 82962 GLUCOSE BLOOD TEST: CPT

## 2017-10-28 PROCEDURE — 87086 URINE CULTURE/COLONY COUNT: CPT | Performed by: INTERNAL MEDICINE

## 2017-10-28 PROCEDURE — 93010 ELECTROCARDIOGRAM REPORT: CPT | Performed by: INTERNAL MEDICINE

## 2017-10-28 PROCEDURE — 93005 ELECTROCARDIOGRAM TRACING: CPT | Performed by: HOSPITALIST

## 2017-10-28 PROCEDURE — 71010 XR CHEST 1 VW: CPT | Performed by: RADIOLOGY

## 2017-10-28 PROCEDURE — 81001 URINALYSIS AUTO W/SCOPE: CPT | Performed by: INTERNAL MEDICINE

## 2017-10-28 PROCEDURE — 82553 CREATINE MB FRACTION: CPT | Performed by: HOSPITALIST

## 2017-10-28 PROCEDURE — 63710000001 INSULIN DETEMIR PER 5 UNITS: Performed by: PHYSICIAN ASSISTANT

## 2017-10-28 PROCEDURE — 85025 COMPLETE CBC W/AUTO DIFF WBC: CPT | Performed by: PHYSICIAN ASSISTANT

## 2017-10-28 PROCEDURE — 83880 ASSAY OF NATRIURETIC PEPTIDE: CPT | Performed by: HOSPITALIST

## 2017-10-28 PROCEDURE — 83735 ASSAY OF MAGNESIUM: CPT | Performed by: PHYSICIAN ASSISTANT

## 2017-10-28 PROCEDURE — 84443 ASSAY THYROID STIM HORMONE: CPT | Performed by: HOSPITALIST

## 2017-10-28 PROCEDURE — 86140 C-REACTIVE PROTEIN: CPT | Performed by: HOSPITALIST

## 2017-10-28 PROCEDURE — 25010000002 CEFTRIAXONE: Performed by: PHYSICIAN ASSISTANT

## 2017-10-28 PROCEDURE — 84484 ASSAY OF TROPONIN QUANT: CPT | Performed by: HOSPITALIST

## 2017-10-28 PROCEDURE — 25010000002 ENOXAPARIN PER 10 MG: Performed by: INTERNAL MEDICINE

## 2017-10-28 PROCEDURE — 63710000001 INSULIN ASPART PER 5 UNITS: Performed by: PHYSICIAN ASSISTANT

## 2017-10-28 RX ORDER — IPRATROPIUM BROMIDE AND ALBUTEROL SULFATE 2.5; .5 MG/3ML; MG/3ML
3 SOLUTION RESPIRATORY (INHALATION) EVERY 4 HOURS PRN
Status: DISCONTINUED | OUTPATIENT
Start: 2017-10-28 | End: 2017-11-06 | Stop reason: HOSPADM

## 2017-10-28 RX ORDER — LISINOPRIL 2.5 MG/1
2.5 TABLET ORAL
Status: DISCONTINUED | OUTPATIENT
Start: 2017-10-28 | End: 2017-10-30

## 2017-10-28 RX ORDER — DIGOXIN 0.25 MG/ML
250 INJECTION INTRAMUSCULAR; INTRAVENOUS ONCE
Status: COMPLETED | OUTPATIENT
Start: 2017-10-28 | End: 2017-10-28

## 2017-10-28 RX ORDER — SENNA AND DOCUSATE SODIUM 50; 8.6 MG/1; MG/1
2 TABLET, FILM COATED ORAL NIGHTLY
Status: DISCONTINUED | OUTPATIENT
Start: 2017-10-28 | End: 2017-11-04

## 2017-10-28 RX ADMIN — ACETAMINOPHEN 650 MG: 325 TABLET ORAL at 17:16

## 2017-10-28 RX ADMIN — INSULIN ASPART 2 UNITS: 100 INJECTION, SOLUTION INTRAVENOUS; SUBCUTANEOUS at 11:51

## 2017-10-28 RX ADMIN — CEFTRIAXONE 1 G: 1 INJECTION, POWDER, FOR SOLUTION INTRAMUSCULAR; INTRAVENOUS at 06:40

## 2017-10-28 RX ADMIN — AMIODARONE HYDROCHLORIDE 1 MG/MIN: 1.8 INJECTION, SOLUTION INTRAVENOUS at 11:51

## 2017-10-28 RX ADMIN — CLOPIDOGREL 75 MG: 75 TABLET, FILM COATED ORAL at 08:28

## 2017-10-28 RX ADMIN — CETIRIZINE HYDROCHLORIDE 5 MG: 10 TABLET ORAL at 08:27

## 2017-10-28 RX ADMIN — HYDROCORTISONE 2.5%: 25 CREAM TOPICAL at 16:57

## 2017-10-28 RX ADMIN — INSULIN ASPART 2 UNITS: 100 INJECTION, SOLUTION INTRAVENOUS; SUBCUTANEOUS at 16:58

## 2017-10-28 RX ADMIN — GUAIFENESIN 200 MG: 100 SOLUTION ORAL at 17:09

## 2017-10-28 RX ADMIN — MEROPENEM 1 G: 1 INJECTION, POWDER, FOR SOLUTION INTRAVENOUS at 16:58

## 2017-10-28 RX ADMIN — INSULIN ASPART 3 UNITS: 100 INJECTION, SOLUTION INTRAVENOUS; SUBCUTANEOUS at 21:26

## 2017-10-28 RX ADMIN — DIGOXIN 250 MCG: 0.25 INJECTION INTRAMUSCULAR; INTRAVENOUS at 11:50

## 2017-10-28 RX ADMIN — DILTIAZEM HYDROCHLORIDE 10 MG/HR: 100 INJECTION, POWDER, LYOPHILIZED, FOR SOLUTION INTRAVENOUS at 09:39

## 2017-10-28 RX ADMIN — ASPIRIN 81 MG: 81 TABLET, CHEWABLE ORAL at 08:28

## 2017-10-28 RX ADMIN — AMIODARONE HYDROCHLORIDE 1 MG/MIN: 1.8 INJECTION, SOLUTION INTRAVENOUS at 18:22

## 2017-10-28 RX ADMIN — DOCUSATE SODIUM AND SENNOSIDES 2 TABLET: 8.6; 5 TABLET, FILM COATED ORAL at 21:25

## 2017-10-28 RX ADMIN — DILTIAZEM HYDROCHLORIDE 10 MG/HR: 100 INJECTION, POWDER, LYOPHILIZED, FOR SOLUTION INTRAVENOUS at 09:30

## 2017-10-28 RX ADMIN — IPRATROPIUM BROMIDE AND ALBUTEROL SULFATE 3 ML: .5; 3 SOLUTION RESPIRATORY (INHALATION) at 19:37

## 2017-10-28 RX ADMIN — IPRATROPIUM BROMIDE AND ALBUTEROL SULFATE 3 ML: .5; 3 SOLUTION RESPIRATORY (INHALATION) at 07:14

## 2017-10-28 RX ADMIN — CARVEDILOL 3.12 MG: 3.12 TABLET, FILM COATED ORAL at 08:26

## 2017-10-28 RX ADMIN — LISINOPRIL 2.5 MG: 2.5 TABLET ORAL at 11:50

## 2017-10-28 RX ADMIN — HYDROCORTISONE 2.5%: 25 CREAM TOPICAL at 11:50

## 2017-10-28 RX ADMIN — AMIODARONE HYDROCHLORIDE 1 MG/MIN: 1.8 INJECTION, SOLUTION INTRAVENOUS at 23:50

## 2017-10-28 RX ADMIN — ATORVASTATIN CALCIUM 80 MG: 40 TABLET, FILM COATED ORAL at 21:25

## 2017-10-28 RX ADMIN — ENOXAPARIN SODIUM 30 MG: 30 INJECTION SUBCUTANEOUS at 06:40

## 2017-10-28 RX ADMIN — FERROUS SULFATE TAB 325 MG (65 MG ELEMENTAL FE) 325 MG: 325 (65 FE) TAB at 08:28

## 2017-10-28 RX ADMIN — CARVEDILOL 3.12 MG: 3.12 TABLET, FILM COATED ORAL at 17:09

## 2017-10-28 RX ADMIN — PANTOPRAZOLE SODIUM 40 MG: 40 TABLET, DELAYED RELEASE ORAL at 06:40

## 2017-10-28 RX ADMIN — INSULIN DETEMIR 12 UNITS: 100 INJECTION, SOLUTION SUBCUTANEOUS at 21:26

## 2017-10-28 RX ADMIN — RIVASTIGMINE 1 PATCH: 9.5 PATCH, EXTENDED RELEASE TRANSDERMAL at 08:27

## 2017-10-28 RX ADMIN — Medication 1 CAPSULE: at 08:28

## 2017-10-29 LAB
027 TOXIN: NORMAL
C DIFF TOX GENS STL QL NAA+PROBE: NEGATIVE
CK MB SERPL-CCNC: 4.03 NG/ML (ref 0–5)
CK MB SERPL-RTO: 8.2 % (ref 0–3)
CK SERPL-CCNC: 49 U/L (ref 24–173)
D-LACTATE SERPL-SCNC: 1.6 MMOL/L (ref 0.5–2)
GLUCOSE BLDC GLUCOMTR-MCNC: 195 MG/DL (ref 70–130)
GLUCOSE BLDC GLUCOMTR-MCNC: 211 MG/DL (ref 70–130)
GLUCOSE BLDC GLUCOMTR-MCNC: 222 MG/DL (ref 70–130)
GLUCOSE BLDC GLUCOMTR-MCNC: 234 MG/DL (ref 70–130)
TROPONIN I SERPL-MCNC: 2.39 NG/ML

## 2017-10-29 PROCEDURE — 82553 CREATINE MB FRACTION: CPT | Performed by: HOSPITALIST

## 2017-10-29 PROCEDURE — 25010000002 MEROPENEM: Performed by: HOSPITALIST

## 2017-10-29 PROCEDURE — 25010000002 FUROSEMIDE PER 20 MG: Performed by: INTERNAL MEDICINE

## 2017-10-29 PROCEDURE — 99233 SBSQ HOSP IP/OBS HIGH 50: CPT | Performed by: HOSPITALIST

## 2017-10-29 PROCEDURE — 25010000002 HALOPERIDOL LACTATE PER 5 MG: Performed by: HOSPITALIST

## 2017-10-29 PROCEDURE — 83605 ASSAY OF LACTIC ACID: CPT | Performed by: HOSPITALIST

## 2017-10-29 PROCEDURE — 94799 UNLISTED PULMONARY SVC/PX: CPT

## 2017-10-29 PROCEDURE — 25010000002 AMIODARONE IN DEXTROSE 5% 360-4.14 MG/200ML-% SOLUTION: Performed by: INTERNAL MEDICINE

## 2017-10-29 PROCEDURE — 84484 ASSAY OF TROPONIN QUANT: CPT | Performed by: HOSPITALIST

## 2017-10-29 PROCEDURE — 25010000002 FUROSEMIDE PER 20 MG: Performed by: HOSPITALIST

## 2017-10-29 PROCEDURE — 25010000002 ENOXAPARIN PER 10 MG: Performed by: INTERNAL MEDICINE

## 2017-10-29 PROCEDURE — 82550 ASSAY OF CK (CPK): CPT | Performed by: HOSPITALIST

## 2017-10-29 PROCEDURE — 82962 GLUCOSE BLOOD TEST: CPT

## 2017-10-29 PROCEDURE — 99232 SBSQ HOSP IP/OBS MODERATE 35: CPT | Performed by: INTERNAL MEDICINE

## 2017-10-29 PROCEDURE — 63710000001 INSULIN DETEMIR PER 5 UNITS: Performed by: PHYSICIAN ASSISTANT

## 2017-10-29 PROCEDURE — 63710000001 INSULIN ASPART PER 5 UNITS: Performed by: PHYSICIAN ASSISTANT

## 2017-10-29 PROCEDURE — 87493 C DIFF AMPLIFIED PROBE: CPT | Performed by: HOSPITALIST

## 2017-10-29 RX ORDER — AMIODARONE HYDROCHLORIDE 200 MG/1
200 TABLET ORAL 3 TIMES DAILY
Status: DISCONTINUED | OUTPATIENT
Start: 2017-10-29 | End: 2017-11-06 | Stop reason: HOSPADM

## 2017-10-29 RX ORDER — HALOPERIDOL 5 MG/ML
5 INJECTION INTRAMUSCULAR ONCE
Status: COMPLETED | OUTPATIENT
Start: 2017-10-29 | End: 2017-10-29

## 2017-10-29 RX ORDER — LOPERAMIDE HYDROCHLORIDE 2 MG/1
2 CAPSULE ORAL 3 TIMES DAILY PRN
Status: DISCONTINUED | OUTPATIENT
Start: 2017-10-29 | End: 2017-11-06 | Stop reason: HOSPADM

## 2017-10-29 RX ORDER — FUROSEMIDE 10 MG/ML
20 INJECTION INTRAMUSCULAR; INTRAVENOUS ONCE
Status: COMPLETED | OUTPATIENT
Start: 2017-10-29 | End: 2017-10-29

## 2017-10-29 RX ORDER — POTASSIUM CHLORIDE 750 MG/1
10 TABLET, FILM COATED, EXTENDED RELEASE ORAL DAILY
Status: DISCONTINUED | OUTPATIENT
Start: 2017-10-29 | End: 2017-10-30

## 2017-10-29 RX ORDER — FUROSEMIDE 10 MG/ML
20 INJECTION INTRAMUSCULAR; INTRAVENOUS DAILY
Status: DISCONTINUED | OUTPATIENT
Start: 2017-10-29 | End: 2017-10-30

## 2017-10-29 RX ORDER — CARVEDILOL 6.25 MG/1
6.25 TABLET ORAL 2 TIMES DAILY WITH MEALS
Status: DISCONTINUED | OUTPATIENT
Start: 2017-10-29 | End: 2017-11-06 | Stop reason: HOSPADM

## 2017-10-29 RX ADMIN — CARVEDILOL 3.12 MG: 3.12 TABLET, FILM COATED ORAL at 08:11

## 2017-10-29 RX ADMIN — INSULIN ASPART 3 UNITS: 100 INJECTION, SOLUTION INTRAVENOUS; SUBCUTANEOUS at 21:35

## 2017-10-29 RX ADMIN — INSULIN ASPART 3 UNITS: 100 INJECTION, SOLUTION INTRAVENOUS; SUBCUTANEOUS at 17:20

## 2017-10-29 RX ADMIN — RIVASTIGMINE 1 PATCH: 9.5 PATCH, EXTENDED RELEASE TRANSDERMAL at 08:12

## 2017-10-29 RX ADMIN — CARVEDILOL 6.25 MG: 6.25 TABLET, FILM COATED ORAL at 17:19

## 2017-10-29 RX ADMIN — HYDROCORTISONE 2.5%: 25 CREAM TOPICAL at 08:11

## 2017-10-29 RX ADMIN — GUAIFENESIN 200 MG: 100 SOLUTION ORAL at 13:52

## 2017-10-29 RX ADMIN — ASPIRIN 81 MG: 81 TABLET, CHEWABLE ORAL at 08:12

## 2017-10-29 RX ADMIN — POTASSIUM CHLORIDE 10 MEQ: 750 TABLET, FILM COATED, EXTENDED RELEASE ORAL at 13:53

## 2017-10-29 RX ADMIN — INSULIN ASPART 2 UNITS: 100 INJECTION, SOLUTION INTRAVENOUS; SUBCUTANEOUS at 08:12

## 2017-10-29 RX ADMIN — HALOPERIDOL LACTATE 5 MG: 5 INJECTION, SOLUTION INTRAMUSCULAR at 15:58

## 2017-10-29 RX ADMIN — INSULIN ASPART 3 UNITS: 100 INJECTION, SOLUTION INTRAVENOUS; SUBCUTANEOUS at 13:54

## 2017-10-29 RX ADMIN — IPRATROPIUM BROMIDE AND ALBUTEROL SULFATE 3 ML: .5; 3 SOLUTION RESPIRATORY (INHALATION) at 06:30

## 2017-10-29 RX ADMIN — FUROSEMIDE 20 MG: 10 INJECTION, SOLUTION INTRAMUSCULAR; INTRAVENOUS at 13:54

## 2017-10-29 RX ADMIN — MEROPENEM 1 G: 1 INJECTION, POWDER, FOR SOLUTION INTRAVENOUS at 08:10

## 2017-10-29 RX ADMIN — FERROUS SULFATE TAB 325 MG (65 MG ELEMENTAL FE) 325 MG: 325 (65 FE) TAB at 08:11

## 2017-10-29 RX ADMIN — IPRATROPIUM BROMIDE AND ALBUTEROL SULFATE 3 ML: .5; 3 SOLUTION RESPIRATORY (INHALATION) at 19:20

## 2017-10-29 RX ADMIN — AMIODARONE HYDROCHLORIDE 200 MG: 200 TABLET ORAL at 21:34

## 2017-10-29 RX ADMIN — INSULIN DETEMIR 12 UNITS: 100 INJECTION, SOLUTION SUBCUTANEOUS at 21:36

## 2017-10-29 RX ADMIN — LOPERAMIDE HYDROCHLORIDE 2 MG: 2 CAPSULE ORAL at 17:19

## 2017-10-29 RX ADMIN — LISINOPRIL 2.5 MG: 2.5 TABLET ORAL at 08:11

## 2017-10-29 RX ADMIN — HYDROCORTISONE 2.5%: 25 CREAM TOPICAL at 17:19

## 2017-10-29 RX ADMIN — DILTIAZEM HYDROCHLORIDE 10 MG/HR: 100 INJECTION, POWDER, LYOPHILIZED, FOR SOLUTION INTRAVENOUS at 00:40

## 2017-10-29 RX ADMIN — ENOXAPARIN SODIUM 30 MG: 30 INJECTION SUBCUTANEOUS at 06:52

## 2017-10-29 RX ADMIN — PANTOPRAZOLE SODIUM 40 MG: 40 TABLET, DELAYED RELEASE ORAL at 06:52

## 2017-10-29 RX ADMIN — MEROPENEM 1 G: 1 INJECTION, POWDER, FOR SOLUTION INTRAVENOUS at 00:32

## 2017-10-29 RX ADMIN — CETIRIZINE HYDROCHLORIDE 5 MG: 10 TABLET ORAL at 08:11

## 2017-10-29 RX ADMIN — CLOPIDOGREL 75 MG: 75 TABLET, FILM COATED ORAL at 08:12

## 2017-10-29 RX ADMIN — Medication 1 CAPSULE: at 08:11

## 2017-10-29 RX ADMIN — GUAIFENESIN 200 MG: 100 SOLUTION ORAL at 08:11

## 2017-10-29 RX ADMIN — MEROPENEM 1 G: 1 INJECTION, POWDER, FOR SOLUTION INTRAVENOUS at 13:53

## 2017-10-29 RX ADMIN — AMIODARONE HYDROCHLORIDE 200 MG: 200 TABLET ORAL at 13:53

## 2017-10-29 RX ADMIN — ATORVASTATIN CALCIUM 80 MG: 40 TABLET, FILM COATED ORAL at 21:35

## 2017-10-29 RX ADMIN — RIVAROXABAN 15 MG: 15 TABLET, FILM COATED ORAL at 17:19

## 2017-10-29 RX ADMIN — AMIODARONE HYDROCHLORIDE 1 MG/MIN: 1.8 INJECTION, SOLUTION INTRAVENOUS at 06:52

## 2017-10-30 ENCOUNTER — APPOINTMENT (OUTPATIENT)
Dept: GENERAL RADIOLOGY | Facility: HOSPITAL | Age: 82
End: 2017-10-30

## 2017-10-30 LAB
ANION GAP SERPL CALCULATED.3IONS-SCNC: 6 MMOL/L (ref 3.6–11.2)
BACTERIA SPEC AEROBE CULT: NO GROWTH
BASOPHILS # BLD AUTO: 0.04 10*3/MM3 (ref 0–0.3)
BASOPHILS NFR BLD AUTO: 0.5 % (ref 0–2)
BNP SERPL-MCNC: 818 PG/ML (ref 0–100)
BUN BLD-MCNC: 11 MG/DL (ref 7–21)
BUN/CREAT SERPL: 15.3 (ref 7–25)
CALCIUM SPEC-SCNC: 8.5 MG/DL (ref 7.7–10)
CHLORIDE SERPL-SCNC: 106 MMOL/L (ref 99–112)
CO2 SERPL-SCNC: 26 MMOL/L (ref 24.3–31.9)
CREAT BLD-MCNC: 0.72 MG/DL (ref 0.43–1.29)
DEPRECATED RDW RBC AUTO: 58.3 FL (ref 37–54)
EOSINOPHIL # BLD AUTO: 0.03 10*3/MM3 (ref 0–0.7)
EOSINOPHIL NFR BLD AUTO: 0.3 % (ref 0–7)
ERYTHROCYTE [DISTWIDTH] IN BLOOD BY AUTOMATED COUNT: 18.6 % (ref 11.5–14.5)
GFR SERPL CREATININE-BSD FRML MDRD: 77 ML/MIN/1.73
GLUCOSE BLD-MCNC: 197 MG/DL (ref 70–110)
GLUCOSE BLDC GLUCOMTR-MCNC: 138 MG/DL (ref 70–130)
GLUCOSE BLDC GLUCOMTR-MCNC: 168 MG/DL (ref 70–130)
GLUCOSE BLDC GLUCOMTR-MCNC: 171 MG/DL (ref 70–130)
GLUCOSE BLDC GLUCOMTR-MCNC: 203 MG/DL (ref 70–130)
GLUCOSE BLDC GLUCOMTR-MCNC: 269 MG/DL (ref 70–130)
HCT VFR BLD AUTO: 36.1 % (ref 37–47)
HGB BLD-MCNC: 11.1 G/DL (ref 12–16)
IMM GRANULOCYTES # BLD: 0.03 10*3/MM3 (ref 0–0.03)
IMM GRANULOCYTES NFR BLD: 0.3 % (ref 0–0.5)
LYMPHOCYTES # BLD AUTO: 1.27 10*3/MM3 (ref 1–3)
LYMPHOCYTES NFR BLD AUTO: 14.8 % (ref 16–46)
MCH RBC QN AUTO: 26.7 PG (ref 27–33)
MCHC RBC AUTO-ENTMCNC: 30.7 G/DL (ref 33–37)
MCV RBC AUTO: 86.8 FL (ref 80–94)
MONOCYTES # BLD AUTO: 0.98 10*3/MM3 (ref 0.1–0.9)
MONOCYTES NFR BLD AUTO: 11.4 % (ref 0–12)
NEUTROPHILS # BLD AUTO: 6.24 10*3/MM3 (ref 1.4–6.5)
NEUTROPHILS NFR BLD AUTO: 72.7 % (ref 40–75)
OSMOLALITY SERPL CALC.SUM OF ELEC: 280.6 MOSM/KG (ref 273–305)
PLATELET # BLD AUTO: 159 10*3/MM3 (ref 130–400)
PMV BLD AUTO: 10.5 FL (ref 6–10)
POTASSIUM BLD-SCNC: 3.9 MMOL/L (ref 3.5–5.3)
RBC # BLD AUTO: 4.16 10*6/MM3 (ref 4.2–5.4)
SODIUM BLD-SCNC: 138 MMOL/L (ref 135–153)
WBC NRBC COR # BLD: 8.59 10*3/MM3 (ref 4.5–12.5)

## 2017-10-30 PROCEDURE — 97163 PT EVAL HIGH COMPLEX 45 MIN: CPT

## 2017-10-30 PROCEDURE — 83880 ASSAY OF NATRIURETIC PEPTIDE: CPT | Performed by: INTERNAL MEDICINE

## 2017-10-30 PROCEDURE — 99233 SBSQ HOSP IP/OBS HIGH 50: CPT | Performed by: INTERNAL MEDICINE

## 2017-10-30 PROCEDURE — G8978 MOBILITY CURRENT STATUS: HCPCS

## 2017-10-30 PROCEDURE — 71010 XR CHEST AP: CPT | Performed by: RADIOLOGY

## 2017-10-30 PROCEDURE — 25010000002 FUROSEMIDE PER 20 MG: Performed by: INTERNAL MEDICINE

## 2017-10-30 PROCEDURE — 63710000001 INSULIN ASPART PER 5 UNITS: Performed by: PHYSICIAN ASSISTANT

## 2017-10-30 PROCEDURE — 85025 COMPLETE CBC W/AUTO DIFF WBC: CPT | Performed by: HOSPITALIST

## 2017-10-30 PROCEDURE — G8979 MOBILITY GOAL STATUS: HCPCS

## 2017-10-30 PROCEDURE — 63710000001 INSULIN DETEMIR PER 5 UNITS: Performed by: INTERNAL MEDICINE

## 2017-10-30 PROCEDURE — 97116 GAIT TRAINING THERAPY: CPT

## 2017-10-30 PROCEDURE — 25010000002 MEROPENEM: Performed by: HOSPITALIST

## 2017-10-30 PROCEDURE — 82962 GLUCOSE BLOOD TEST: CPT

## 2017-10-30 PROCEDURE — 25010000002 CEFTRIAXONE PER 250 MG: Performed by: INTERNAL MEDICINE

## 2017-10-30 PROCEDURE — 92526 ORAL FUNCTION THERAPY: CPT

## 2017-10-30 PROCEDURE — 80048 BASIC METABOLIC PNL TOTAL CA: CPT | Performed by: HOSPITALIST

## 2017-10-30 PROCEDURE — 94799 UNLISTED PULMONARY SVC/PX: CPT

## 2017-10-30 PROCEDURE — 99232 SBSQ HOSP IP/OBS MODERATE 35: CPT | Performed by: INTERNAL MEDICINE

## 2017-10-30 PROCEDURE — 71010 HC CHEST AP: CPT

## 2017-10-30 RX ORDER — FUROSEMIDE 10 MG/ML
40 INJECTION INTRAMUSCULAR; INTRAVENOUS EVERY 12 HOURS
Status: DISCONTINUED | OUTPATIENT
Start: 2017-10-30 | End: 2017-11-03

## 2017-10-30 RX ORDER — LISINOPRIL 2.5 MG/1
5 TABLET ORAL
Status: DISCONTINUED | OUTPATIENT
Start: 2017-10-31 | End: 2017-11-01

## 2017-10-30 RX ORDER — POTASSIUM CHLORIDE 20 MEQ/1
20 TABLET, EXTENDED RELEASE ORAL 2 TIMES DAILY WITH MEALS
Status: DISCONTINUED | OUTPATIENT
Start: 2017-10-30 | End: 2017-10-31

## 2017-10-30 RX ADMIN — POTASSIUM CHLORIDE 10 MEQ: 750 TABLET, FILM COATED, EXTENDED RELEASE ORAL at 09:03

## 2017-10-30 RX ADMIN — CETIRIZINE HYDROCHLORIDE 5 MG: 10 TABLET ORAL at 09:02

## 2017-10-30 RX ADMIN — AMIODARONE HYDROCHLORIDE 200 MG: 200 TABLET ORAL at 18:06

## 2017-10-30 RX ADMIN — ATORVASTATIN CALCIUM 80 MG: 40 TABLET, FILM COATED ORAL at 20:44

## 2017-10-30 RX ADMIN — DOCUSATE SODIUM AND SENNOSIDES 2 TABLET: 8.6; 5 TABLET, FILM COATED ORAL at 20:44

## 2017-10-30 RX ADMIN — MEROPENEM 1 G: 1 INJECTION, POWDER, FOR SOLUTION INTRAVENOUS at 00:58

## 2017-10-30 RX ADMIN — CARVEDILOL 6.25 MG: 6.25 TABLET, FILM COATED ORAL at 18:05

## 2017-10-30 RX ADMIN — POTASSIUM CHLORIDE 20 MEQ: 1500 TABLET, EXTENDED RELEASE ORAL at 18:06

## 2017-10-30 RX ADMIN — IPRATROPIUM BROMIDE AND ALBUTEROL SULFATE 3 ML: .5; 3 SOLUTION RESPIRATORY (INHALATION) at 10:44

## 2017-10-30 RX ADMIN — INSULIN DETEMIR 17 UNITS: 100 INJECTION, SOLUTION SUBCUTANEOUS at 20:43

## 2017-10-30 RX ADMIN — AMIODARONE HYDROCHLORIDE 200 MG: 200 TABLET ORAL at 20:44

## 2017-10-30 RX ADMIN — RIVAROXABAN 15 MG: 15 TABLET, FILM COATED ORAL at 18:05

## 2017-10-30 RX ADMIN — Medication 1 CAPSULE: at 09:02

## 2017-10-30 RX ADMIN — PANTOPRAZOLE SODIUM 40 MG: 40 TABLET, DELAYED RELEASE ORAL at 07:29

## 2017-10-30 RX ADMIN — CEFTRIAXONE 1 G: 1 INJECTION, SOLUTION INTRAVENOUS at 13:33

## 2017-10-30 RX ADMIN — METRONIDAZOLE 500 MG: 500 INJECTION, SOLUTION INTRAVENOUS at 21:25

## 2017-10-30 RX ADMIN — FUROSEMIDE 40 MG: 10 INJECTION, SOLUTION INTRAMUSCULAR; INTRAVENOUS at 20:49

## 2017-10-30 RX ADMIN — FUROSEMIDE 20 MG: 10 INJECTION, SOLUTION INTRAMUSCULAR; INTRAVENOUS at 09:03

## 2017-10-30 RX ADMIN — AMIODARONE HYDROCHLORIDE 200 MG: 200 TABLET ORAL at 09:03

## 2017-10-30 RX ADMIN — IPRATROPIUM BROMIDE AND ALBUTEROL SULFATE 3 ML: .5; 3 SOLUTION RESPIRATORY (INHALATION) at 19:19

## 2017-10-30 RX ADMIN — CARVEDILOL 6.25 MG: 6.25 TABLET, FILM COATED ORAL at 09:03

## 2017-10-30 RX ADMIN — HYDROCORTISONE 2.5%: 25 CREAM TOPICAL at 18:06

## 2017-10-30 RX ADMIN — INSULIN ASPART 4 UNITS: 100 INJECTION, SOLUTION INTRAVENOUS; SUBCUTANEOUS at 21:04

## 2017-10-30 RX ADMIN — INSULIN ASPART 3 UNITS: 100 INJECTION, SOLUTION INTRAVENOUS; SUBCUTANEOUS at 09:03

## 2017-10-30 RX ADMIN — FERROUS SULFATE TAB 325 MG (65 MG ELEMENTAL FE) 325 MG: 325 (65 FE) TAB at 09:03

## 2017-10-30 RX ADMIN — CLOPIDOGREL 75 MG: 75 TABLET, FILM COATED ORAL at 09:02

## 2017-10-30 RX ADMIN — METRONIDAZOLE 500 MG: 500 INJECTION, SOLUTION INTRAVENOUS at 13:33

## 2017-10-30 RX ADMIN — HYDROCORTISONE 2.5%: 25 CREAM TOPICAL at 09:04

## 2017-10-30 RX ADMIN — RIVASTIGMINE 1 PATCH: 9.5 PATCH, EXTENDED RELEASE TRANSDERMAL at 09:04

## 2017-10-30 RX ADMIN — LISINOPRIL 2.5 MG: 2.5 TABLET ORAL at 09:02

## 2017-10-30 RX ADMIN — INSULIN ASPART 2 UNITS: 100 INJECTION, SOLUTION INTRAVENOUS; SUBCUTANEOUS at 13:59

## 2017-10-30 RX ADMIN — IPRATROPIUM BROMIDE AND ALBUTEROL SULFATE 3 ML: .5; 3 SOLUTION RESPIRATORY (INHALATION) at 05:17

## 2017-10-30 RX ADMIN — MEROPENEM 1 G: 1 INJECTION, POWDER, FOR SOLUTION INTRAVENOUS at 10:57

## 2017-10-31 LAB
ALBUMIN SERPL-MCNC: 3.3 G/DL (ref 3.4–4.8)
ALBUMIN/GLOB SERPL: 1.2 G/DL (ref 1.5–2.5)
ALP SERPL-CCNC: 64 U/L (ref 35–104)
ALT SERPL W P-5'-P-CCNC: 19 U/L (ref 10–36)
ANION GAP SERPL CALCULATED.3IONS-SCNC: 5.7 MMOL/L (ref 3.6–11.2)
ANISOCYTOSIS BLD QL: ABNORMAL
AST SERPL-CCNC: 21 U/L (ref 10–30)
BACTERIA SPEC AEROBE CULT: NORMAL
BACTERIA SPEC AEROBE CULT: NORMAL
BILIRUB SERPL-MCNC: 0.4 MG/DL (ref 0.2–1.8)
BNP SERPL-MCNC: 446 PG/ML (ref 0–100)
BUN BLD-MCNC: 11 MG/DL (ref 7–21)
BUN/CREAT SERPL: 14.1 (ref 7–25)
CALCIUM SPEC-SCNC: 8.8 MG/DL (ref 7.7–10)
CHLORIDE SERPL-SCNC: 104 MMOL/L (ref 99–112)
CO2 SERPL-SCNC: 31.3 MMOL/L (ref 24.3–31.9)
CREAT BLD-MCNC: 0.78 MG/DL (ref 0.43–1.29)
CRP SERPL-MCNC: 3.64 MG/DL (ref 0–0.99)
DEPRECATED RDW RBC AUTO: 57.6 FL (ref 37–54)
EOSINOPHIL # BLD MANUAL: 0.08 10*3/MM3 (ref 0–0.7)
EOSINOPHIL NFR BLD MANUAL: 1 % (ref 0–7)
ERYTHROCYTE [DISTWIDTH] IN BLOOD BY AUTOMATED COUNT: 18.3 % (ref 11.5–14.5)
GFR SERPL CREATININE-BSD FRML MDRD: 71 ML/MIN/1.73
GLOBULIN UR ELPH-MCNC: 2.7 GM/DL
GLUCOSE BLD-MCNC: 140 MG/DL (ref 70–110)
GLUCOSE BLDC GLUCOMTR-MCNC: 148 MG/DL (ref 70–130)
GLUCOSE BLDC GLUCOMTR-MCNC: 174 MG/DL (ref 70–130)
GLUCOSE BLDC GLUCOMTR-MCNC: 257 MG/DL (ref 70–130)
GLUCOSE BLDC GLUCOMTR-MCNC: 308 MG/DL (ref 70–130)
GLUCOSE BLDC GLUCOMTR-MCNC: 329 MG/DL (ref 70–130)
HCT VFR BLD AUTO: 38.3 % (ref 37–47)
HGB BLD-MCNC: 11.8 G/DL (ref 12–16)
HYPOCHROMIA BLD QL: ABNORMAL
LYMPHOCYTES # BLD MANUAL: 1.28 10*3/MM3 (ref 1–3)
LYMPHOCYTES NFR BLD MANUAL: 16 % (ref 16–46)
LYMPHOCYTES NFR BLD MANUAL: 5 % (ref 0–12)
MAGNESIUM SERPL-MCNC: 1.6 MG/DL (ref 1.7–2.6)
MCH RBC QN AUTO: 26.4 PG (ref 27–33)
MCHC RBC AUTO-ENTMCNC: 30.8 G/DL (ref 33–37)
MCV RBC AUTO: 85.7 FL (ref 80–94)
MONOCYTES # BLD AUTO: 0.4 10*3/MM3 (ref 0.1–0.9)
NEUTROPHILS # BLD AUTO: 6.15 10*3/MM3 (ref 1.4–6.5)
NEUTROPHILS NFR BLD MANUAL: 77 % (ref 40–75)
OSMOLALITY SERPL CALC.SUM OF ELEC: 283 MOSM/KG (ref 273–305)
PLAT MORPH BLD: NORMAL
PLATELET # BLD AUTO: 205 10*3/MM3 (ref 130–400)
PMV BLD AUTO: 10.9 FL (ref 6–10)
POTASSIUM BLD-SCNC: 3.6 MMOL/L (ref 3.5–5.3)
PROT SERPL-MCNC: 6 G/DL (ref 6–8)
RBC # BLD AUTO: 4.47 10*6/MM3 (ref 4.2–5.4)
SCAN SLIDE: NORMAL
SODIUM BLD-SCNC: 141 MMOL/L (ref 135–153)
VARIANT LYMPHS NFR BLD MANUAL: 1 % (ref 0–5)
WBC NRBC COR # BLD: 7.99 10*3/MM3 (ref 4.5–12.5)

## 2017-10-31 PROCEDURE — G8998 SWALLOW D/C STATUS: HCPCS

## 2017-10-31 PROCEDURE — 94799 UNLISTED PULMONARY SVC/PX: CPT

## 2017-10-31 PROCEDURE — 63710000001 INSULIN ASPART PER 5 UNITS: Performed by: PHYSICIAN ASSISTANT

## 2017-10-31 PROCEDURE — 86140 C-REACTIVE PROTEIN: CPT | Performed by: INTERNAL MEDICINE

## 2017-10-31 PROCEDURE — 99233 SBSQ HOSP IP/OBS HIGH 50: CPT | Performed by: INTERNAL MEDICINE

## 2017-10-31 PROCEDURE — G8996 SWALLOW CURRENT STATUS: HCPCS

## 2017-10-31 PROCEDURE — 85025 COMPLETE CBC W/AUTO DIFF WBC: CPT | Performed by: INTERNAL MEDICINE

## 2017-10-31 PROCEDURE — 97116 GAIT TRAINING THERAPY: CPT

## 2017-10-31 PROCEDURE — 83735 ASSAY OF MAGNESIUM: CPT | Performed by: INTERNAL MEDICINE

## 2017-10-31 PROCEDURE — 25010000002 MAGNESIUM SULFATE IN D5W 1G/100ML (PREMIX) 1-5 GM/100ML-% SOLUTION: Performed by: PHYSICIAN ASSISTANT

## 2017-10-31 PROCEDURE — 25010000002 FUROSEMIDE PER 20 MG: Performed by: INTERNAL MEDICINE

## 2017-10-31 PROCEDURE — 85007 BL SMEAR W/DIFF WBC COUNT: CPT | Performed by: INTERNAL MEDICINE

## 2017-10-31 PROCEDURE — 63710000001 INSULIN ASPART PER 5 UNITS: Performed by: INTERNAL MEDICINE

## 2017-10-31 PROCEDURE — 82962 GLUCOSE BLOOD TEST: CPT

## 2017-10-31 PROCEDURE — 63710000001 INSULIN DETEMIR PER 5 UNITS: Performed by: INTERNAL MEDICINE

## 2017-10-31 PROCEDURE — 80053 COMPREHEN METABOLIC PANEL: CPT | Performed by: INTERNAL MEDICINE

## 2017-10-31 PROCEDURE — 97530 THERAPEUTIC ACTIVITIES: CPT

## 2017-10-31 PROCEDURE — G8997 SWALLOW GOAL STATUS: HCPCS

## 2017-10-31 PROCEDURE — 99232 SBSQ HOSP IP/OBS MODERATE 35: CPT | Performed by: INTERNAL MEDICINE

## 2017-10-31 PROCEDURE — 83880 ASSAY OF NATRIURETIC PEPTIDE: CPT | Performed by: INTERNAL MEDICINE

## 2017-10-31 PROCEDURE — 25010000002 MAGNESIUM SULFATE IN D5W 1G/100ML (PREMIX) 1-5 GM/100ML-% SOLUTION: Performed by: INTERNAL MEDICINE

## 2017-10-31 PROCEDURE — 25010000002 CEFTRIAXONE PER 250 MG: Performed by: INTERNAL MEDICINE

## 2017-10-31 PROCEDURE — 25010000002 METHYLPREDNISOLONE PER 40 MG: Performed by: NURSE PRACTITIONER

## 2017-10-31 PROCEDURE — 92526 ORAL FUNCTION THERAPY: CPT

## 2017-10-31 RX ORDER — MAGNESIUM SULFATE 1 G/100ML
1 INJECTION INTRAVENOUS ONCE
Status: COMPLETED | OUTPATIENT
Start: 2017-10-31 | End: 2017-10-31

## 2017-10-31 RX ORDER — FUROSEMIDE 10 MG/ML
40 INJECTION INTRAMUSCULAR; INTRAVENOUS ONCE
Status: COMPLETED | OUTPATIENT
Start: 2017-10-31 | End: 2017-10-31

## 2017-10-31 RX ORDER — POTASSIUM CHLORIDE 1.5 G/1.77G
40 POWDER, FOR SOLUTION ORAL 3 TIMES DAILY
Status: COMPLETED | OUTPATIENT
Start: 2017-10-31 | End: 2017-10-31

## 2017-10-31 RX ORDER — METHYLPREDNISOLONE SODIUM SUCCINATE 40 MG/ML
40 INJECTION, POWDER, LYOPHILIZED, FOR SOLUTION INTRAMUSCULAR; INTRAVENOUS EVERY 12 HOURS
Status: DISCONTINUED | OUTPATIENT
Start: 2017-10-31 | End: 2017-11-02

## 2017-10-31 RX ADMIN — RIVASTIGMINE 1 PATCH: 9.5 PATCH, EXTENDED RELEASE TRANSDERMAL at 09:53

## 2017-10-31 RX ADMIN — POTASSIUM CHLORIDE 20 MEQ: 1500 TABLET, EXTENDED RELEASE ORAL at 09:52

## 2017-10-31 RX ADMIN — CARVEDILOL 6.25 MG: 6.25 TABLET, FILM COATED ORAL at 09:52

## 2017-10-31 RX ADMIN — AMIODARONE HYDROCHLORIDE 200 MG: 200 TABLET ORAL at 20:36

## 2017-10-31 RX ADMIN — CLOPIDOGREL 75 MG: 75 TABLET, FILM COATED ORAL at 09:53

## 2017-10-31 RX ADMIN — FUROSEMIDE 40 MG: 10 INJECTION, SOLUTION INTRAMUSCULAR; INTRAVENOUS at 09:53

## 2017-10-31 RX ADMIN — Medication: at 17:41

## 2017-10-31 RX ADMIN — INSULIN DETEMIR 17 UNITS: 100 INJECTION, SOLUTION SUBCUTANEOUS at 20:38

## 2017-10-31 RX ADMIN — CEFTRIAXONE 1 G: 1 INJECTION, SOLUTION INTRAVENOUS at 13:25

## 2017-10-31 RX ADMIN — CARVEDILOL 6.25 MG: 6.25 TABLET, FILM COATED ORAL at 17:40

## 2017-10-31 RX ADMIN — Medication 1 APPLICATION: at 20:35

## 2017-10-31 RX ADMIN — POTASSIUM CHLORIDE 40 MEQ: 1.5 POWDER, FOR SOLUTION ORAL at 13:26

## 2017-10-31 RX ADMIN — PANTOPRAZOLE SODIUM 40 MG: 40 TABLET, DELAYED RELEASE ORAL at 06:02

## 2017-10-31 RX ADMIN — INSULIN ASPART 5 UNITS: 100 INJECTION, SOLUTION INTRAVENOUS; SUBCUTANEOUS at 09:54

## 2017-10-31 RX ADMIN — GUAIFENESIN 200 MG: 100 SOLUTION ORAL at 06:05

## 2017-10-31 RX ADMIN — METRONIDAZOLE 500 MG: 500 INJECTION, SOLUTION INTRAVENOUS at 21:00

## 2017-10-31 RX ADMIN — HYDROCORTISONE 2.5%: 25 CREAM TOPICAL at 17:40

## 2017-10-31 RX ADMIN — Medication 1 CAPSULE: at 09:55

## 2017-10-31 RX ADMIN — LISINOPRIL 5 MG: 2.5 TABLET ORAL at 09:55

## 2017-10-31 RX ADMIN — AMIODARONE HYDROCHLORIDE 200 MG: 200 TABLET ORAL at 09:50

## 2017-10-31 RX ADMIN — IPRATROPIUM BROMIDE AND ALBUTEROL SULFATE 3 ML: .5; 3 SOLUTION RESPIRATORY (INHALATION) at 06:23

## 2017-10-31 RX ADMIN — METHYLPREDNISOLONE SODIUM SUCCINATE 40 MG: 40 INJECTION, POWDER, FOR SOLUTION INTRAMUSCULAR; INTRAVENOUS at 20:35

## 2017-10-31 RX ADMIN — DOCUSATE SODIUM AND SENNOSIDES 2 TABLET: 8.6; 5 TABLET, FILM COATED ORAL at 20:36

## 2017-10-31 RX ADMIN — ATORVASTATIN CALCIUM 80 MG: 40 TABLET, FILM COATED ORAL at 20:35

## 2017-10-31 RX ADMIN — CETIRIZINE HYDROCHLORIDE 5 MG: 10 TABLET ORAL at 09:52

## 2017-10-31 RX ADMIN — LOPERAMIDE HYDROCHLORIDE 2 MG: 2 CAPSULE ORAL at 17:40

## 2017-10-31 RX ADMIN — POTASSIUM CHLORIDE 40 MEQ: 1.5 POWDER, FOR SOLUTION ORAL at 17:39

## 2017-10-31 RX ADMIN — INSULIN ASPART 5 UNITS: 100 INJECTION, SOLUTION INTRAVENOUS; SUBCUTANEOUS at 17:38

## 2017-10-31 RX ADMIN — INSULIN ASPART 2 UNITS: 100 INJECTION, SOLUTION INTRAVENOUS; SUBCUTANEOUS at 09:54

## 2017-10-31 RX ADMIN — FERROUS SULFATE TAB 325 MG (65 MG ELEMENTAL FE) 325 MG: 325 (65 FE) TAB at 09:50

## 2017-10-31 RX ADMIN — FUROSEMIDE 40 MG: 10 INJECTION, SOLUTION INTRAMUSCULAR; INTRAVENOUS at 20:35

## 2017-10-31 RX ADMIN — METRONIDAZOLE 500 MG: 500 INJECTION, SOLUTION INTRAVENOUS at 06:02

## 2017-10-31 RX ADMIN — POTASSIUM CHLORIDE 40 MEQ: 1.5 POWDER, FOR SOLUTION ORAL at 20:36

## 2017-10-31 RX ADMIN — AMIODARONE HYDROCHLORIDE 200 MG: 200 TABLET ORAL at 17:40

## 2017-10-31 RX ADMIN — IPRATROPIUM BROMIDE 0.5 MG: 0.5 SOLUTION RESPIRATORY (INHALATION) at 13:42

## 2017-10-31 RX ADMIN — MAGNESIUM SULFATE IN DEXTROSE 1 G: 10 INJECTION, SOLUTION INTRAVENOUS at 06:02

## 2017-10-31 RX ADMIN — MAGNESIUM SULFATE IN DEXTROSE 1 G: 10 INJECTION, SOLUTION INTRAVENOUS at 13:26

## 2017-10-31 RX ADMIN — IPRATROPIUM BROMIDE 0.5 MG: 0.5 SOLUTION RESPIRATORY (INHALATION) at 18:50

## 2017-10-31 RX ADMIN — RIVAROXABAN 15 MG: 15 TABLET, FILM COATED ORAL at 17:40

## 2017-10-31 RX ADMIN — OFLOXACIN 50000 UNITS: 300 TABLET, COATED ORAL at 09:53

## 2017-10-31 RX ADMIN — FUROSEMIDE 40 MG: 10 INJECTION, SOLUTION INTRAMUSCULAR; INTRAVENOUS at 13:51

## 2017-10-31 RX ADMIN — METHYLPREDNISOLONE SODIUM SUCCINATE 40 MG: 40 INJECTION, POWDER, FOR SOLUTION INTRAMUSCULAR; INTRAVENOUS at 09:55

## 2017-10-31 RX ADMIN — HYDROCORTISONE 2.5%: 25 CREAM TOPICAL at 09:56

## 2017-10-31 RX ADMIN — INSULIN ASPART 5 UNITS: 100 INJECTION, SOLUTION INTRAVENOUS; SUBCUTANEOUS at 17:39

## 2017-10-31 RX ADMIN — INSULIN ASPART 4 UNITS: 100 INJECTION, SOLUTION INTRAVENOUS; SUBCUTANEOUS at 21:02

## 2017-10-31 RX ADMIN — METRONIDAZOLE 500 MG: 500 INJECTION, SOLUTION INTRAVENOUS at 13:26

## 2017-11-01 ENCOUNTER — APPOINTMENT (OUTPATIENT)
Dept: GENERAL RADIOLOGY | Facility: HOSPITAL | Age: 82
End: 2017-11-01

## 2017-11-01 LAB
ANION GAP SERPL CALCULATED.3IONS-SCNC: 8.5 MMOL/L (ref 3.6–11.2)
ANISOCYTOSIS BLD QL: NORMAL
BASOPHILS # BLD AUTO: 0.03 10*3/MM3 (ref 0–0.3)
BASOPHILS NFR BLD AUTO: 0.2 % (ref 0–2)
BUN BLD-MCNC: 22 MG/DL (ref 7–21)
BUN/CREAT SERPL: 23.9 (ref 7–25)
CALCIUM SPEC-SCNC: 8.8 MG/DL (ref 7.7–10)
CHLORIDE SERPL-SCNC: 99 MMOL/L (ref 99–112)
CO2 SERPL-SCNC: 30.5 MMOL/L (ref 24.3–31.9)
CREAT BLD-MCNC: 0.92 MG/DL (ref 0.43–1.29)
DEPRECATED RDW RBC AUTO: 57.1 FL (ref 37–54)
EOSINOPHIL # BLD AUTO: 0 10*3/MM3 (ref 0–0.7)
EOSINOPHIL NFR BLD AUTO: 0 % (ref 0–7)
ERYTHROCYTE [DISTWIDTH] IN BLOOD BY AUTOMATED COUNT: 18.4 % (ref 11.5–14.5)
GFR SERPL CREATININE-BSD FRML MDRD: 58 ML/MIN/1.73
GLUCOSE BLD-MCNC: 342 MG/DL (ref 70–110)
GLUCOSE BLDC GLUCOMTR-MCNC: 245 MG/DL (ref 70–130)
GLUCOSE BLDC GLUCOMTR-MCNC: 277 MG/DL (ref 70–130)
GLUCOSE BLDC GLUCOMTR-MCNC: 377 MG/DL (ref 70–130)
GLUCOSE BLDC GLUCOMTR-MCNC: 396 MG/DL (ref 70–130)
HCT VFR BLD AUTO: 39.4 % (ref 37–47)
HGB BLD-MCNC: 12.3 G/DL (ref 12–16)
HYPOCHROMIA BLD QL: NORMAL
IMM GRANULOCYTES # BLD: 0.08 10*3/MM3 (ref 0–0.03)
IMM GRANULOCYTES NFR BLD: 0.5 % (ref 0–0.5)
LYMPHOCYTES # BLD AUTO: 1.29 10*3/MM3 (ref 1–3)
LYMPHOCYTES NFR BLD AUTO: 8.4 % (ref 16–46)
MAGNESIUM SERPL-MCNC: 1.8 MG/DL (ref 1.7–2.6)
MCH RBC QN AUTO: 26.5 PG (ref 27–33)
MCHC RBC AUTO-ENTMCNC: 31.2 G/DL (ref 33–37)
MCV RBC AUTO: 84.9 FL (ref 80–94)
MONOCYTES # BLD AUTO: 0.93 10*3/MM3 (ref 0.1–0.9)
MONOCYTES NFR BLD AUTO: 6.1 % (ref 0–12)
NEUTROPHILS # BLD AUTO: 12.97 10*3/MM3 (ref 1.4–6.5)
NEUTROPHILS NFR BLD AUTO: 84.8 % (ref 40–75)
OSMOLALITY SERPL CALC.SUM OF ELEC: 292.5 MOSM/KG (ref 273–305)
PLAT MORPH BLD: NORMAL
PLATELET # BLD AUTO: 290 10*3/MM3 (ref 130–400)
PMV BLD AUTO: 10.8 FL (ref 6–10)
POTASSIUM BLD-SCNC: 4.4 MMOL/L (ref 3.5–5.3)
RBC # BLD AUTO: 4.64 10*6/MM3 (ref 4.2–5.4)
SODIUM BLD-SCNC: 138 MMOL/L (ref 135–153)
WBC NRBC COR # BLD: 15.3 10*3/MM3 (ref 4.5–12.5)

## 2017-11-01 PROCEDURE — G8997 SWALLOW GOAL STATUS: HCPCS

## 2017-11-01 PROCEDURE — 74230 X-RAY XM SWLNG FUNCJ C+: CPT

## 2017-11-01 PROCEDURE — G8996 SWALLOW CURRENT STATUS: HCPCS

## 2017-11-01 PROCEDURE — 82962 GLUCOSE BLOOD TEST: CPT

## 2017-11-01 PROCEDURE — 63710000001 INSULIN DETEMIR PER 5 UNITS: Performed by: INTERNAL MEDICINE

## 2017-11-01 PROCEDURE — 80048 BASIC METABOLIC PNL TOTAL CA: CPT | Performed by: NURSE PRACTITIONER

## 2017-11-01 PROCEDURE — 99232 SBSQ HOSP IP/OBS MODERATE 35: CPT | Performed by: INTERNAL MEDICINE

## 2017-11-01 PROCEDURE — 94799 UNLISTED PULMONARY SVC/PX: CPT

## 2017-11-01 PROCEDURE — 63710000001 INSULIN ASPART PER 5 UNITS: Performed by: PHYSICIAN ASSISTANT

## 2017-11-01 PROCEDURE — 85007 BL SMEAR W/DIFF WBC COUNT: CPT | Performed by: NURSE PRACTITIONER

## 2017-11-01 PROCEDURE — 63710000001 INSULIN ASPART PER 5 UNITS: Performed by: INTERNAL MEDICINE

## 2017-11-01 PROCEDURE — 25010000002 FUROSEMIDE PER 20 MG: Performed by: INTERNAL MEDICINE

## 2017-11-01 PROCEDURE — 92611 MOTION FLUOROSCOPY/SWALLOW: CPT

## 2017-11-01 PROCEDURE — 25010000002 CEFTRIAXONE PER 250 MG: Performed by: INTERNAL MEDICINE

## 2017-11-01 PROCEDURE — 71010 HC CHEST AP: CPT

## 2017-11-01 PROCEDURE — 97110 THERAPEUTIC EXERCISES: CPT

## 2017-11-01 PROCEDURE — 74230 X-RAY XM SWLNG FUNCJ C+: CPT | Performed by: RADIOLOGY

## 2017-11-01 PROCEDURE — 25010000002 MAGNESIUM SULFATE 2 GM/50ML SOLUTION: Performed by: INTERNAL MEDICINE

## 2017-11-01 PROCEDURE — 85025 COMPLETE CBC W/AUTO DIFF WBC: CPT | Performed by: NURSE PRACTITIONER

## 2017-11-01 PROCEDURE — 97530 THERAPEUTIC ACTIVITIES: CPT

## 2017-11-01 PROCEDURE — 83735 ASSAY OF MAGNESIUM: CPT | Performed by: NURSE PRACTITIONER

## 2017-11-01 PROCEDURE — 25010000002 METHYLPREDNISOLONE PER 40 MG: Performed by: NURSE PRACTITIONER

## 2017-11-01 PROCEDURE — G8998 SWALLOW D/C STATUS: HCPCS

## 2017-11-01 PROCEDURE — 97116 GAIT TRAINING THERAPY: CPT

## 2017-11-01 PROCEDURE — 99233 SBSQ HOSP IP/OBS HIGH 50: CPT | Performed by: INTERNAL MEDICINE

## 2017-11-01 PROCEDURE — 71010 XR CHEST AP: CPT | Performed by: RADIOLOGY

## 2017-11-01 RX ORDER — MAGNESIUM SULFATE HEPTAHYDRATE 40 MG/ML
2 INJECTION, SOLUTION INTRAVENOUS ONCE
Status: COMPLETED | OUTPATIENT
Start: 2017-11-01 | End: 2017-11-01

## 2017-11-01 RX ORDER — POTASSIUM CHLORIDE 7.45 MG/ML
10 INJECTION INTRAVENOUS
Status: DISCONTINUED | OUTPATIENT
Start: 2017-11-01 | End: 2017-11-06 | Stop reason: HOSPADM

## 2017-11-01 RX ORDER — MAGNESIUM SULFATE 1 G/100ML
1 INJECTION INTRAVENOUS AS NEEDED
Status: DISCONTINUED | OUTPATIENT
Start: 2017-11-01 | End: 2017-11-06 | Stop reason: HOSPADM

## 2017-11-01 RX ORDER — POTASSIUM CHLORIDE 750 MG/1
40 CAPSULE, EXTENDED RELEASE ORAL AS NEEDED
Status: DISCONTINUED | OUTPATIENT
Start: 2017-11-01 | End: 2017-11-06 | Stop reason: HOSPADM

## 2017-11-01 RX ORDER — VALSARTAN 80 MG/1
80 TABLET ORAL
Status: DISCONTINUED | OUTPATIENT
Start: 2017-11-01 | End: 2017-11-06 | Stop reason: HOSPADM

## 2017-11-01 RX ORDER — ACETYLCYSTEINE 200 MG/ML
600 SOLUTION ORAL; RESPIRATORY (INHALATION) EVERY 12 HOURS SCHEDULED
Status: COMPLETED | OUTPATIENT
Start: 2017-11-01 | End: 2017-11-04

## 2017-11-01 RX ORDER — POTASSIUM CHLORIDE 1.5 G/1.77G
40 POWDER, FOR SOLUTION ORAL AS NEEDED
Status: DISCONTINUED | OUTPATIENT
Start: 2017-11-01 | End: 2017-11-06 | Stop reason: HOSPADM

## 2017-11-01 RX ORDER — FUROSEMIDE 10 MG/ML
80 INJECTION INTRAMUSCULAR; INTRAVENOUS ONCE
Status: COMPLETED | OUTPATIENT
Start: 2017-11-01 | End: 2017-11-01

## 2017-11-01 RX ORDER — MAGNESIUM SULFATE HEPTAHYDRATE 40 MG/ML
2 INJECTION, SOLUTION INTRAVENOUS AS NEEDED
Status: DISCONTINUED | OUTPATIENT
Start: 2017-11-01 | End: 2017-11-06 | Stop reason: HOSPADM

## 2017-11-01 RX ORDER — MAGNESIUM SULFATE HEPTAHYDRATE 40 MG/ML
4 INJECTION, SOLUTION INTRAVENOUS AS NEEDED
Status: DISCONTINUED | OUTPATIENT
Start: 2017-11-01 | End: 2017-11-06 | Stop reason: HOSPADM

## 2017-11-01 RX ORDER — POTASSIUM CHLORIDE 1.5 G/1.77G
40 POWDER, FOR SOLUTION ORAL ONCE
Status: COMPLETED | OUTPATIENT
Start: 2017-11-01 | End: 2017-11-01

## 2017-11-01 RX ADMIN — IPRATROPIUM BROMIDE AND ALBUTEROL SULFATE 3 ML: .5; 3 SOLUTION RESPIRATORY (INHALATION) at 12:28

## 2017-11-01 RX ADMIN — INSULIN ASPART 5 UNITS: 100 INJECTION, SOLUTION INTRAVENOUS; SUBCUTANEOUS at 09:46

## 2017-11-01 RX ADMIN — IPRATROPIUM BROMIDE 0.5 MG: 0.5 SOLUTION RESPIRATORY (INHALATION) at 00:34

## 2017-11-01 RX ADMIN — VALSARTAN 80 MG: 80 TABLET ORAL at 11:44

## 2017-11-01 RX ADMIN — INSULIN ASPART 5 UNITS: 100 INJECTION, SOLUTION INTRAVENOUS; SUBCUTANEOUS at 17:24

## 2017-11-01 RX ADMIN — DOCUSATE SODIUM AND SENNOSIDES 2 TABLET: 8.6; 5 TABLET, FILM COATED ORAL at 21:54

## 2017-11-01 RX ADMIN — RIVAROXABAN 15 MG: 15 TABLET, FILM COATED ORAL at 17:24

## 2017-11-01 RX ADMIN — INSULIN ASPART 6 UNITS: 100 INJECTION, SOLUTION INTRAVENOUS; SUBCUTANEOUS at 21:32

## 2017-11-01 RX ADMIN — CARVEDILOL 6.25 MG: 6.25 TABLET, FILM COATED ORAL at 09:44

## 2017-11-01 RX ADMIN — HYDROCORTISONE 2.5%: 25 CREAM TOPICAL at 09:46

## 2017-11-01 RX ADMIN — FUROSEMIDE 40 MG: 10 INJECTION, SOLUTION INTRAMUSCULAR; INTRAVENOUS at 21:33

## 2017-11-01 RX ADMIN — FUROSEMIDE 40 MG: 10 INJECTION, SOLUTION INTRAMUSCULAR; INTRAVENOUS at 09:45

## 2017-11-01 RX ADMIN — HYDROCORTISONE 2.5%: 25 CREAM TOPICAL at 17:25

## 2017-11-01 RX ADMIN — CETIRIZINE HYDROCHLORIDE 5 MG: 10 TABLET ORAL at 09:44

## 2017-11-01 RX ADMIN — FERROUS SULFATE TAB 325 MG (65 MG ELEMENTAL FE) 325 MG: 325 (65 FE) TAB at 09:44

## 2017-11-01 RX ADMIN — METRONIDAZOLE 500 MG: 500 INJECTION, SOLUTION INTRAVENOUS at 21:30

## 2017-11-01 RX ADMIN — PANTOPRAZOLE SODIUM 40 MG: 40 TABLET, DELAYED RELEASE ORAL at 06:29

## 2017-11-01 RX ADMIN — AMIODARONE HYDROCHLORIDE 200 MG: 200 TABLET ORAL at 21:32

## 2017-11-01 RX ADMIN — METRONIDAZOLE 500 MG: 500 INJECTION, SOLUTION INTRAVENOUS at 13:24

## 2017-11-01 RX ADMIN — METHYLPREDNISOLONE SODIUM SUCCINATE 40 MG: 40 INJECTION, POWDER, FOR SOLUTION INTRAMUSCULAR; INTRAVENOUS at 09:46

## 2017-11-01 RX ADMIN — INSULIN DETEMIR 25 UNITS: 100 INJECTION, SOLUTION SUBCUTANEOUS at 21:48

## 2017-11-01 RX ADMIN — Medication 1 CAPSULE: at 09:47

## 2017-11-01 RX ADMIN — ACETYLCYSTEINE 600 MG: 200 SOLUTION ORAL; RESPIRATORY (INHALATION) at 12:27

## 2017-11-01 RX ADMIN — METRONIDAZOLE 500 MG: 500 INJECTION, SOLUTION INTRAVENOUS at 06:29

## 2017-11-01 RX ADMIN — METHYLPREDNISOLONE SODIUM SUCCINATE 40 MG: 40 INJECTION, POWDER, FOR SOLUTION INTRAMUSCULAR; INTRAVENOUS at 21:30

## 2017-11-01 RX ADMIN — ACETYLCYSTEINE 200 MG: 200 SOLUTION ORAL; RESPIRATORY (INHALATION) at 19:11

## 2017-11-01 RX ADMIN — AMIODARONE HYDROCHLORIDE 200 MG: 200 TABLET ORAL at 17:23

## 2017-11-01 RX ADMIN — IPRATROPIUM BROMIDE 0.5 MG: 0.5 SOLUTION RESPIRATORY (INHALATION) at 06:12

## 2017-11-01 RX ADMIN — ATORVASTATIN CALCIUM 80 MG: 40 TABLET, FILM COATED ORAL at 21:32

## 2017-11-01 RX ADMIN — FUROSEMIDE 80 MG: 10 INJECTION, SOLUTION INTRAMUSCULAR; INTRAVENOUS at 13:24

## 2017-11-01 RX ADMIN — AMIODARONE HYDROCHLORIDE 200 MG: 200 TABLET ORAL at 09:43

## 2017-11-01 RX ADMIN — MAGNESIUM SULFATE IN WATER 2 G: 40 INJECTION, SOLUTION INTRAVENOUS at 21:54

## 2017-11-01 RX ADMIN — CEFTRIAXONE 1 G: 1 INJECTION, SOLUTION INTRAVENOUS at 13:24

## 2017-11-01 RX ADMIN — RIVASTIGMINE 1 PATCH: 9.5 PATCH, EXTENDED RELEASE TRANSDERMAL at 09:47

## 2017-11-01 RX ADMIN — POTASSIUM CHLORIDE 40 MEQ: 1.5 POWDER, FOR SOLUTION ORAL at 11:44

## 2017-11-01 RX ADMIN — INSULIN ASPART 6 UNITS: 100 INJECTION, SOLUTION INTRAVENOUS; SUBCUTANEOUS at 17:25

## 2017-11-01 RX ADMIN — INSULIN ASPART 3 UNITS: 100 INJECTION, SOLUTION INTRAVENOUS; SUBCUTANEOUS at 09:45

## 2017-11-01 RX ADMIN — CARVEDILOL 6.25 MG: 6.25 TABLET, FILM COATED ORAL at 21:34

## 2017-11-01 RX ADMIN — GUAIFENESIN 200 MG: 100 SOLUTION ORAL at 09:47

## 2017-11-01 RX ADMIN — IPRATROPIUM BROMIDE AND ALBUTEROL SULFATE 3 ML: .5; 3 SOLUTION RESPIRATORY (INHALATION) at 19:11

## 2017-11-01 RX ADMIN — CLOPIDOGREL 75 MG: 75 TABLET, FILM COATED ORAL at 09:44

## 2017-11-01 NOTE — PROGRESS NOTES
I have seen the patient in conjunction with Juana ROBLERO and daughter    History of presenting illness:  History is from the patient as well as the patient's daughter.  When I entered the room patient had a significant coughing paroxysm.  She had an open milk and orange juice on the table.  Patient's daughter states the patient has been coughing for as long she can remember.  They would carry around the halls cough drops and anytime she ate she states that she would have coughing.  Patient herself does not remember eating breakfast due to her dementia but she is awake and alert.  Yesterday a Sales catheter had to be placed due to increasing perineal erythema. Purwik was tried at lead nursing order.    Vital Signs  Temp:  [97.7 °F (36.5 °C)-98.3 °F (36.8 °C)] 98.3 °F (36.8 °C)  Heart Rate:  [63-90] 90  Resp:  [16-29] 23  BP: (110-164)/(50-86) 113/50  Body mass index is 35.77 kg/(m^2).      Intake/Output Summary (Last 24 hours) at 11/01/17 0919  Last data filed at 11/01/17 0300   Gross per 24 hour   Intake              350 ml   Output              935 ml   Net             -585 ml     With Sales catheter patient has had 935 cc out overnight.    Intake & Output (last 3 days)       10/29 0701 - 10/30 0700 10/30 0701 - 10/31 0700 10/31 0701 - 11/01 0700 11/01 0701 - 11/02 0700    P.O. 710 120 540     I.V. (mL/kg)        IV Piggyback 300 250 50     Total Intake(mL/kg) 1010 (12.3) 370 (4.5) 590 (7.3)     Urine (mL/kg/hr)  0 (0) 935 (0.5)     Stool  0 (0)      Total Output   0 935      Net +1010 +370 -345              Unmeasured Urine Occurrence 5 x 7 x      Unmeasured Stool Occurrence 8 x 2 x  1 x          Physical exam:  Physical Exam   Constitutional: Well-developed, well-nourished.  Awake, on current oxygen saturations 93%  Head: Normocephalic and atraumatic.  Hearing appears to be intact, mucous membranes are moist.  Speech is normal  Eyes:  Pupils are equal, round,  No scleral icterus.   Cardiovascular: Normal rate,  regular rhythm and normal heart sounds.  No murmur rub or gallop  Pulmonary/Chest: Bilateral breath sounds modest air excursion but with some end-expiratory wheezing heard bilaterally   Abdominal: Soft, flat, bowel sounds are active, nondistended nontender.  No peritoneal signs   Musculoskeletal: Strength is symmetric she moves all extremities on command ,No edema. she has good palpable distal pulses   Neurological: Nonfocal, alert.    Skin: Skin is warm and dry.   Psychiatric:  Normal mood and affect.   Sales catheter is in place medium yellow clear urine    Telemetry: sinus, reviewed    Results Review:  Lab Results   Component Value Date    WBC 7.99 10/31/2017    HGB 11.8 (L) 10/31/2017    HCT 38.3 10/31/2017    MCV 85.7 10/31/2017     10/31/2017     Lab Results   Component Value Date    GLUCOSE 140 (H) 10/31/2017    BUN 11 10/31/2017    CREATININE 0.78 10/31/2017    EGFRIFNONA 71 10/31/2017    BCR 14.1 10/31/2017    CO2 31.3 10/31/2017    CALCIUM 8.8 10/31/2017    ALBUMIN 3.30 (L) 10/31/2017    LABIL2 1.2 (L) 10/31/2017    AST 21 10/31/2017    ALT 19 10/31/2017       Imaging Results (last 72 hours)     Procedure Component Value Units Date/Time    XR Chest 1 View [[id number]] Collected:  10/28/17 0925     Updated:  10/28/17 0928    Narrative:       Technique: Frontal view the chest.     COMPARISON:  10/26/2017     INDICATION:     fever; I21.4-Non-ST elevation (NSTEMI) myocardial  infarction; N30.00-Acute cystitis without hematuria      FINDINGS:    Coarsened interstitial markings. Cardiomegaly is noted.  Prominent interstitial markings are noted. There are small bilateral  pleural effusions.        Impression:       Radiographic changes of congestive failure.     This report was finalized on 10/28/2017 9:26 AM by Dr. Harshal Stoddard MD.       XR Chest AP [[id number]] Collected:  10/30/17 0701     Updated:  10/30/17 0751    Narrative:       XR CHEST AP-     HISTORY:  CHF; I21.4-Non-ST elevation (NSTEMI)  myocardial infarction;  N30.00-Acute cystitis without hematuria          COMPARISON: 10/28/2017      TECHNIQUE: Single frontal view of the chest.     FINDINGS:     Bibasilar consolidation.  Mild CHF.  There is no evidence of an acute osseous abnormality.   There are no suspicious-appearing parenchymal soft tissue nodules.            Impression:       Bibasilar consolidation and mild CHF.         This report was finalized on 10/30/2017 7:49 AM by Dr. Daryl Rosas MD.         Echo EF 40 %    Chest x-ray reviewed, some fluid in the right fissure, pulmonary congestion better but still significant consolidation left lung      Assessment/Plan     Active Problems:  NSTEMI (non-ST elevated myocardial infarction), being medically managed, daughter is in agreement with this. Patient is on Plavix, beta blocker, and high-dose statin.  She is asymptomatic.      Persistent cough, she does have a reduced ejection fraction and although lisinopril was just added I'm changing this to Diovan.  I'm still concerned that she may be having silent aspiration and may have been having this for prolonged period of time or at least deep penetration of fluids.  I've changed her to nectar thick liquids and contacted speech therapy and asked him to follow-up again.    Systolic congestive heart failure, patient is on schedule Lasix, electrolytes unremarkable, ACE inhibitor change to ARB for above reasons.  Chest x-ray shows improving congestion.  Patient continues on IV Lasix.    Pneumonia, patient is now on Rocephin/Flagyl.  White count is normal, CRP continues to come down.  Blood Cultures remain negative    UTI, Klebsiella, Rocephin , organism should be sensitive to this     Severe Sepsis secondary to pneumonia and UTI    Acute hypoxic respiratory failure secondary to*congestive heart failure in combination with pneumonia.        Paroxysmal atrial fibrillation, now in sinus rhythm, she has been changed to oral amiodarone andon xarelto for  stroke prevention    Functional decline , progressing well with therapy    Baseline dementia    Diabetes on steroids glucoses high, insulin was again adjusted.    Hypomagnesemia, replaced, electrolytes for this a.m. are pending.  Patient is on potassium and magnesium protocols.    Tong Carrera MD  11/01/17  9:19 AM

## 2017-11-01 NOTE — DISCHARGE PLACEMENT REQUEST
"Floridalma Bryant (83 y.o. Female)     Date of Birth Social Security Number Address Home Phone MRN    1934  [address] [facility] [place], [place] [address] [phone] [id number]    Rastafarian Marital Status          Sabianism        Admission Date Admission Type Admitting Provider Attending Provider Department, Room/Bed    10/26/17 Emergency Zaira Oliveros MD Oculam, Claire Chin, MD [facility] CARE, P203/S2    Discharge Date Discharge Disposition Discharge Destination                      Attending Provider: Zaira Oliveros MD     Allergies:  Codeine, Metformin And Related    Isolation:  None   Infection:  None   Code Status:  FULL    Ht:  59\" (149.9 cm)   Wt:  177 lb 1.6 oz (80.3 kg)    Admission Cmt:  None   Principal Problem:  None                Active Insurance as of 10/26/2017     Primary Coverage     Payor Plan Insurance Group Employer/Plan Group    MEDICARE MEDICARE A & B      Payor Plan Address Payor Plan Phone Number Effective From Effective To    [address] [phone] 9/1/1999     [place] [address]       Subscriber Name Subscriber Birth Date Member ID       FLORIDALMA BRYANT 1934 [id number]           Secondary Coverage     Payor Plan Insurance Group Employer/Plan Group    [facility] [id number]     Payor Plan Address Payor Plan Phone Number Effective From Effective To    [address] [address]  1/1/2017     [place] [address]       Subscriber Name Subscriber Birth Date Member ID       ROQUE BRYANT 6/27/1936 [id number]                 Emergency Contacts      (Rel.) Home Phone Work Phone Mobile Phone    Jaz Loredo (Daughter) [phone] -- --            Emergency Contact Information     Name Relation Home Work Mobile    Jaz Loredo Daughter [phone]            Insurance Information                MEDICARE/MEDICARE A & B Phone: [phone]    Subscriber: Floridalma Bryant Subscriber#: [id number]    Group#:  " "Precert#:         Seen/Seen Phone:     Subscriber: Bebeto Bryant Subscriber#: [id number]    Group#: [id number] Precert#:              History & Physical      Zaira Oliveros MD at 10/26/2017  7:43 AM               [facility] HOSPITALIST HISTORY AND PHYSICAL    Patient Identification:  Name:  Floridalma Bryant  Age:  83 y.o.  Sex:  female  :  1934  MRN:  [id number]   Visit Number:  [id number]  Primary Care Physician:  LILY Christensen     Chief complaint:   Chief Complaint   Patient presents with   • Vomiting     History of presenting illness:   Patient is a 83 y.o. female with past medical history significant for type II diabetes mellitus and essential hypertension and advanced dementia with ocassional behavioral disturbances. She presented to the [facility] emergency department for evaluation of abdominal pain, nausea and vomiting.. It is noted that the patient is demented and unable to provide sufficient history, no family present at bedside during my evaluation. Patient's daughter provided history to ED provider and staff. Thus, history is provided per patient's chart and ED staff. Per report, patient has been experiencing shortness of air, nausea, and vomiting. Started late last night. No fever, she has chronic loos stools but only had one bowel movement last night non melena or bloody. Per the daughter, patient also had increased altered mental status with underlying dementia. She had also been experiencing chills. Per my evaluation, patient states she feels \"ill\" but has trouble explaining what is making her sick. She does remember she had an upset stomach this morning, but states she does not remember if she had vomiting or diarrhea. She was coughing on exam, denies any sputum production. Denies chest pain.     Upon arrival to the ED, vitals were temperature 102.1, heart rate 109, respiratory rate 20, blood pressure 119/65, and oxygen saturation 76% on " room air.  Initial ABG with pH 7.390, PCO2 42.7, PaO2 46.5, HCO3 25.3, and oxygen saturation 79% on room air.  CMP with glucose 240 and EGFR 60.  CBC with neutrophil percentage elevated at 90.4% and absolute neutrophil count 9.97.  Gastric acid within normal limits.  Influenza negative.  Urinalysis was cloudy appearance, trace glucose, trace blood, 2+ leukocyte esterase, 1+ protein, and too numerous to count white blood cells.  D-dimer negative.  Lipid profile with total cholesterol 150, HDL 34, LDL 93, triglycerides 117.  CT of the abdomen and pelvis revealing a small hiatal hernia and cholelithiasis.  Chest x-ray with patchy bibasilar airspace disease.  CT of the chest with multiple bilateral pulmonary nodules that are indeterminant.  EKG with sinus tachycardia, right bundle branch block, left fascicular block, QTc interval 487 ms, no ST-T wave changes.  Similar to previous EKGs.  While in the emergency department, patient was given acetaminophen for fever.  She was given aspirin 300 mg for elevated troponin as well as therapeutic Lovenox, Plavix 300 mg, and metoprolol 1 mg  IV injection because her blood pressure could not tolerate.  Patient also received a 500 mL bolus of normal saline.  She was also started on Rocephin IV.    Family reports that she is always very touchy and reports pain when she get touched palpated due to her dementia.    Patient has been admitted to the critical care unit for further evaluation and treatment.   ---------------------------------------------------------------------------------------------------------------------   Review of Systems   Unable to perform ROS: Dementia      ---------------------------------------------------------------------------------------------------------------------   Past Medical History:   Diagnosis Date   • Dementia    • Diabetes mellitus    • Hypertension    • Pancreatitis      Past Surgical History:   Procedure Laterality Date   • ANKLE SURGERY     •  HYSTERECTOMY     • SHOULDER SURGERY     • TYMPANOPLASTY       History reviewed. No pertinent family history.  Social History     Social History   • Marital status:      Spouse name: N/A   • Number of children: N/A   • Years of education: N/A     Social History Main Topics   • Smoking status: Never Smoker   • Smokeless tobacco: Never Used   • Alcohol use No   • Drug use: No   • Sexual activity: Defer     Other Topics Concern   • None     Social History Narrative   • None     ---------------------------------------------------------------------------------------------------------------------   Allergies:  Codeine and Metformin and related  ---------------------------------------------------------------------------------------------------------------------   Prior to Admission Medications     Prescriptions Last Dose Informant Patient Reported? Taking?    citalopram (CeleXA) 20 MG tablet   Yes Yes    Take 20 mg by mouth Daily.    econazole nitrate (SPECTAZOLE) 1 % cream   Yes Yes    Apply  topically Daily.    ferrous sulfate 325 (65 FE) MG tablet   Yes Yes    Take 325 mg by mouth Daily With Breakfast.    Fexofenadine HCl (ALLEGRA PO)   Yes Yes    Take 180 mg by mouth 2 (Two) Times a Day.    insulin glargine (LANTUS) 100 UNIT/ML injection   Yes Yes    Inject 12 Units under the skin Daily.    linagliptin (TRADJENTA) 5 MG tablet tablet   Yes Yes    Take 5 mg by mouth Daily.    meclizine (ANTIVERT) 12.5 MG tablet   Yes Yes    Take 12.5 mg by mouth 3 (Three) Times a Day As Needed for dizziness.    nitrofurantoin (MACRODANTIN) 50 MG capsule   Yes Yes    Take 50 mg by mouth Daily.    omeprazole (priLOSEC) 20 MG capsule   Yes Yes    Take 20 mg by mouth Daily.    ondansetron (ZOFRAN) 4 MG tablet   Yes Yes    Take 4 mg by mouth Every 8 (Eight) Hours As Needed for Nausea or Vomiting.    rivastigmine (EXELON) 9.5 MG/24HR patch   Yes Yes    Place 1 patch on the skin Daily.    valsartan (DIOVAN) 160 MG tablet   Yes Yes    Take  320 mg by mouth Daily.    vitamin D (ERGOCALCIFEROL) 86249 units capsule capsule   Yes Yes    Take 50,000 Units by mouth 1 (One) Time Per Week.    amoxicillin-clavulanate (AUGMENTIN) 875-125 MG per tablet   No No    Take 1 tablet by mouth 2 (Two) Times a Day.    Dapagliflozin Propanediol 10 MG tablet   Yes No    Take  by mouth Daily.    glimepiride (AMARYL) 2 MG tablet   Yes No    Take 2 mg by mouth 2 (Two) Times a Day.    Loratadine (CLARITIN) 10 MG capsule   Yes No    Take  by mouth.    SITagliptin (JANUVIA) 50 MG tablet   Yes No    Take 50 mg by mouth Daily.        Hospital Scheduled Meds:    acetaminophen 975 mg Oral Once       sodium chloride 125 mL/hr Last Rate: 125 mL/hr (10/26/17 0423)     ---------------------------------------------------------------------------------------------------------------------   Vital Signs:  Temp:  [98.6 °F (37 °C)-102.1 °F (38.9 °C)] 99.8 °F (37.7 °C)  Heart Rate:  [] 84  Resp:  [20] 20  BP: ()/(44-94) 94/44  Last 3 weights    10/26/17  0214   Weight: 185 lb (83.9 kg)     Body mass index is 37.37 kg/(m^2).  SpO2 Readings from Last 3 Encounters:   10/26/17 95%   08/17/17 100%     ---------------------------------------------------------------------------------------------------------------------   Physical Exam:  Constitutional:  Well-developed and well-nourished.  No respiratory distress. Elderly. Nasal cannula in place.  Confused but not agitated.      HENT:  Head: Normocephalic and atraumatic.  Mouth:  Moist mucous membranes.    Eyes:  Conjunctivae and EOM are normal.  Pupils are equal, round, and reactive to light.  No scleral icterus.  Neck:  Neck supple.  No JVD present.  No bruit.  Cardiovascular:  Normal rate, regular rhythm and normal heart sounds with no murmur. Distant heart sound.  Pulmonary/Chest:  No respiratory distress, (+) mild wheezes, diminished breath sounds bilaterally.   Abdominal:  Soft.  Bowel sounds are normal.  No distension. Mild  generalized tenderness to palpation but does not have any pain when distracted while examining. No rigidity, bowel sounds present. She ocassionally jumps with deep palpation but no reproducible or consistently at the same location when repeated and distracted while examining.  Musculoskeletal:  No edema, no tenderness, and no deformity.  No red or swollen joints anywhere.    Neurological:  Alert and oriented to person. She know she is in a hospital but not sure of city or state, she also is not oriented to time.  No cranial nerve deficit.  No tongue deviation.  No facial droop.  No slurred speech.   Skin:  Skin is warm and dry.  No rash noted.  No pallor.   Psychiatric:  Normal mood and affect.  Behavior is normal.  Judgment and thought content normal.   Peripheral vascular:  No edema and strong pulses on all 4 extremities.  Genitourinary: No Sales catheter in place.  ---------------------------------------------------------------------------------------------------------------------  EKG:        Telemetry:    Sinus     I have personally reviewed the EKG/Telemetry strip  ---------------------------------------------------------------------------------------------------------------------     Results from last 7 days  Lab Units 10/26/17  0530 10/26/17  0250   LACTATE mmol/L  --  1.8   WBC 10*3/mm3  --  11.02   HEMOGLOBIN g/dL  --  13.2   HEMATOCRIT %  --  41.9   MCV fL  --  85.2   MCHC g/dL  --  31.5*   PLATELETS 10*3/mm3  --  212   INR  1.14*  --        Results from last 7 days  Lab Units 10/26/17  0236   PH, ARTERIAL pH units 7.390   PO2 ART mm Hg 46.5*   PCO2, ARTERIAL mm Hg 42.7   HCO3 ART mmol/L 25.3       Results from last 7 days  Lab Units 10/26/17  0530 10/26/17  0250   SODIUM mmol/L  --  137   POTASSIUM mmol/L  --  3.9   MAGNESIUM mg/dL 1.6*  --    CHLORIDE mmol/L  --  101   CO2 mmol/L  --  26.3   BUN mg/dL  --  14   CREATININE mg/dL  --  0.90   EGFR IF NONAFRICN AM mL/min/1.73  --  60*   CALCIUM mg/dL  --   9.3   GLUCOSE mg/dL  --  248*   ALBUMIN g/dL  --  4.00   BILIRUBIN mg/dL  --  0.7   ALK PHOS U/L  --  70   AST (SGOT) U/L  --  26   ALT (SGPT) U/L  --  18   Estimated Creatinine Clearance: 45.5 mL/min (by C-G formula based on Cr of 0.9).  No results found for: AMMONIA    Results from last 7 days  Lab Units 10/26/17  0752 10/26/17  0530   CK TOTAL U/L 170 139   TROPONIN I ng/mL 8.003* 5.713*   CK MB INDEX % 13.9* 13.6*         Lab Results   Component Value Date    HGBA1C 8.90 (H) 10/26/2017     No results found for: TSH, FREET4  No results found for: PREGTESTUR, PREGSERUM, HCG, HCGQUANT  Pain Management Panel     There is no flowsheet data to display.           Results from last 7 days  Lab Units 10/26/17  0530   CHOLESTEROL mg/dL 150   TRIGLYCERIDES mg/dL 117   HDL CHOL mg/dL 34*           I have personally reviewed the above laboratory results.   ---------------------------------------------------------------------------------------------------------------------  Imaging Results (last 7 days)     Procedure Component Value Units Date/Time    CT Abdomen Pelvis With Contrast [[id number]] Resulted:  10/26/17 0528     Updated:  10/26/17 0529    XR Chest 1 View [[id number]] Collected:  10/26/17 0700     Updated:  10/26/17 0702    Narrative:       FINDINGS:    Patchy bibasilar airspace disease. Cardiomegaly is noted.  Prominent interstitial markings are noted. There are small bilateral  pleural effusions.     Impression:       Patchy bibasilar airspace disease.     This report was finalized on 10/26/2017 7:00 AM by Dr. Harshal Stoddard MD     CT Chest Without Contrast [[id number]] Resulted:  10/26/17 0711     Updated:  10/26/17 0711        I have personally reviewed the above radiology results.   ---------------------------------------------------------------------------------------------------------------------  Assessment and Plan:  - NSTEMI.  -Sepsis secondary to urinary tract infection and bilateral community-acquired  pneumonia with possible aspiration in the setting of vomiting  -Acute hypoxic respiratory failure secondary to above.  - Multiple bilateral pulmonary nodules  -Dementia   -Type 2 diabetes mellitus insulin requiring with hyperglycemia  -Essential hypertension  -Small hiatal hernia  -Chronic right bundle branch block  -Prolonged QTc interval 487 ms  -GERD  -Old right basal ganglia lacunar infarct noted on previous head CT  -Advanced age    Patient has been admitted to the critical care unit for further evaluation and treatment.  Patient meets sepsis criteria with tachycardia, tachypnea, and evidence of infection on chest x-ray and urinalysis.  Blood cultures were ordered in the emergency department, will follow for final results. Await final urine culture and sensitivity report. Empirically patient has been placed on IV Rocephin and Flagyl to cover anaerobes.  Will add scheduled nebs.  I will also order for mycoplasma and Legionella testing to rule out atypical causes of pneumonia.  Will have speech therapy evaluate the patient to rule out aspiration.  Respiratory culture also ordered.  Continue supplemental oxygen to titrate SPO2 greater than 90%.  Continue to follow temperature curve, when necessary Tylenol made available as needed.    Patient's troponin is elevated.  Cardiology was consult about emergency department and gave recommendations for medical treatment.  Cardiology following, appreciate input/assistance.  Will continue the patient on daily aspirin, Plavix, beta blocker if her blood pressure allows, and Lovenox.  Will trend cardiac enzymes ×3.  Will obtain a 2-D echocardiogram.  Avoid any further QTc interval prolonging agents.    Will hold patient's home diabetes medications for now.  I have placed her on sliding scale insulin, will titrate insulin therapy as necessary.  Hemoglobin A1c ordered and pending to evaluate glycemic control.  Will closely monitor blood glucose levels with Accu-Cheks every before  meals and daily at bedtime.     Patient's blood pressure is borderline low and has been hypotensive on a few instances on the emergency department.  Will hold patient's home blood pressure medication.  Will closely monitor.  Currently, the patient is not requiring vasopressor support.  Will consider vasopressors if necessary.    Will obtain electrolytes and replace per protocol if necessary.  Repeat labs in the morning and continue to monitor the patient closely in the critical care unit.      DVT Prophylaxis: Lovenox      CODE STATUS: Full code, will discuss with family once present to determine goals of care.     The patient is considered to be a high risk patient due to: Sepsis, UTI, pneumonia, respiratory failure, and STEMI, pulmonary nodules, diabetes, prolonged QTc, advanced age    Plan has been outlined to the patient' and her POA and agreed, further management may change S condition evolves.  Total time spent on this encounter including, management, discussion with daughter who is the POA and rest of family and nursing staff for the plan of care is 55 minutes.    KACI Martinez  10/26/17  9:13 AM  ---------------------------------------------------------------------------------------------------------------------          Electronically signed by Zaira Oliveros MD at 10/26/2017 10:22 AM        Vital Signs (last 24 hours)       10/31 0700  -  11/01 0659 11/01 0700  -  11/01 1059   Most Recent    Temp (°F) 97.7 -  98.3       98.3 (36.8)    Heart Rate 63 -  89    86 -  90     87    Resp 16 -  (!)29       23    /62 -  164/77    111/51 -  132/70     111/51    SpO2 (%) 92 -  98    90 -  95     94          Intake & Output (last 3 days)       10/29 0701 - 10/30 0700 10/30 0701 - 10/31 0700 10/31 0701 - 11/01 0700 11/01 0701 - 11/02 0700    P.O. 710 120 540     I.V. (mL/kg)        IV Piggyback 300 250 50     Total Intake(mL/kg) 1010 (12.3) 370 (4.5) 590 (7.3)     Urine (mL/kg/hr)  0 (0) 935 (0.5)      Stool  0 (0)      Total Output   0 935      Net +1010 +370 -345              Unmeasured Urine Occurrence 5 x 7 x      Unmeasured Stool Occurrence 8 x 2 x  1 x        Hospital Medications (active)       Dose Frequency Start End    acetaminophen (TYLENOL) tablet 650 mg 650 mg Every 4 Hours PRN 10/26/2017     Sig - Route: Take 2 tablets by mouth Every 4 (Four) Hours As Needed for Headache or Fever (temperature greater than 101.5 F). - Oral    acetylcysteine (MUCOMYST) 20 % solution 600 mg 600 mg Every 12 Hours Scheduled 11/1/2017 11/5/2017    Sig - Route: Inhale 3 mL Every 12 (Twelve) Hours. - Inhalation    amiodarone (PACERONE) tablet 200 mg 200 mg 3 Times Daily 10/29/2017     Sig - Route: Take 1 tablet by mouth 3 (Three) Times a Day. - Oral    atorvastatin (LIPITOR) tablet 80 mg 80 mg Nightly 10/26/2017     Sig - Route: Take 2 tablets by mouth Every Night. - Oral    carvedilol (COREG) tablet 6.25 mg 6.25 mg 2 Times Daily With Meals 10/29/2017     Sig - Route: Take 1 tablet by mouth 2 (Two) Times a Day With Meals. - Oral    cefTRIAXone (ROCEPHIN) IVPB 1 g/50ml dextrose (premix) 1 g Every 24 Hours 10/30/2017     Sig - Route: Infuse 50 mL into a venous catheter Daily. - Intravenous    cetirizine (zyrTEC) tablet 5 mg 5 mg Daily 10/26/2017     Sig - Route: Take 0.5 tablets by mouth Daily. - Oral    Cosign for Ordering: Accepted by Zaira Oliveros MD on 10/26/2017 11:24 AM    cholecalciferol (VITAMIN D3) capsule 50,000 Units 50,000 Units Weekly 10/31/2017     Sig - Route: Take 1 capsule by mouth 1 (One) Time Per Week. - Oral    Cosign for Ordering: Accepted by Zaira Oliveros MD on 10/26/2017 11:24 AM    clopidogrel (PLAVIX) tablet 75 mg 75 mg Daily 10/27/2017     Sig - Route: Take 1 tablet by mouth Daily. - Oral    Cosign for Ordering: Accepted by Zaira Oliveros MD on 10/26/2017 11:24 AM    dextrose (D50W) solution 25 g 25 g Every 15 Minutes PRN 10/26/2017     Sig - Route: Infuse 50 mL into a venous catheter  Every 15 (Fifteen) Minutes As Needed for Low Blood Sugar (Blood Sugar Less Than 70, Patient Has IV Access - Unresponsive, NPO or Unable To Safely Swallow). - Intravenous    Cosign for Ordering: Accepted by Denise Estrada MD on 10/26/2017  7:18 PM    dextrose (GLUTOSE) oral gel 15 g 15 g Every 15 Minutes PRN 10/26/2017     Sig - Route: Take 15 g by mouth Every 15 (Fifteen) Minutes As Needed for Low Blood Sugar (Blood Sugar Less Than 70, Patient Alert, Is Not NPO & Can Safely Swallow). - Oral    Cosign for Ordering: Accepted by Denise Estrada MD on 10/26/2017  7:18 PM    ferrous sulfate tablet 325 mg 325 mg Daily With Breakfast 10/26/2017     Sig - Route: Take 1 tablet by mouth Daily With Breakfast. - Oral    Cosign for Ordering: Accepted by Zaira Oliveros MD on 10/26/2017 11:24 AM    furosemide (LASIX) injection 40 mg 40 mg Every 12 Hours 10/30/2017     Sig - Route: Infuse 4 mL into a venous catheter Every 12 (Twelve) Hours. - Intravenous    furosemide (LASIX) injection 40 mg 40 mg Once 10/31/2017 10/31/2017    Sig - Route: Infuse 4 mL into a venous catheter 1 (One) Time. - Intravenous    furosemide (LASIX) injection 80 mg 80 mg Once 11/1/2017     Sig - Route: Infuse 8 mL into a venous catheter 1 (One) Time. - Intravenous    glucagon (human recombinant) (GLUCAGEN DIAGNOSTIC) injection 1 mg 1 mg Every 15 Minutes PRN 10/26/2017     Sig - Route: Inject 1 mg under the skin Every 15 (Fifteen) Minutes As Needed (Blood Glucose Less Than 70 - Patient Without IV Access - Unresponsive, NPO or Unable To Safely Swallow). - Subcutaneous    Cosign for Ordering: Accepted by Denise Estrada MD on 10/26/2017  7:18 PM    guaiFENesin (ROBITUSSIN) 100 MG/5ML oral solution 200 mg 200 mg Every 6 Hours PRN 10/28/2017     Sig - Route: Take 10 mL by mouth Every 6 (Six) Hours As Needed for Cough. - Oral    hydrocortisone (ANUSOL-HC) 2.5 % rectal cream  2 Times Daily 10/28/2017     Sig - Route: Insert  into the rectum 2  (Two) Times a Day. - Rectal    insulin aspart (novoLOG) injection 0-7 Units 0-7 Units 4 Times Daily Before Meals & Nightly 10/26/2017     Sig - Route: Inject 0-7 Units under the skin 4 (Four) Times a Day Before Meals & at Bedtime. - Subcutaneous    Cosign for Ordering: Accepted by Denise Estrada MD on 10/26/2017  7:18 PM    insulin aspart (novoLOG) injection 0-7 Units 0-7 Units Once 10/26/2017     Sig - Route: Inject 0-7 Units under the skin 1 (One) Time. - Subcutaneous    insulin aspart (novoLOG) injection 5 Units 5 Units 3 Times Daily With Meals 10/30/2017     Sig - Route: Inject 5 Units under the skin 3 (Three) Times a Day With Meals. - Subcutaneous    insulin detemir (LEVEMIR) injection 25 Units 25 Units Nightly 11/1/2017     Sig - Route: Inject 25 Units under the skin Every Night. - Subcutaneous    ipratropium (ATROVENT) nebulizer solution 0.5 mg 0.5 mg 4 Times Daily - RT 10/31/2017     Sig - Route: Take 2.5 mL by nebulization 4 (Four) Times a Day. - Nebulization    ipratropium-albuterol (DUO-NEB) nebulizer solution 3 mL 3 mL Every 6 Hours PRN 10/27/2017     Sig - Route: Take 3 mL by nebulization Every 6 (Six) Hours As Needed for Wheezing or Shortness of Air. - Nebulization    ipratropium-albuterol (DUO-NEB) nebulizer solution 3 mL 3 mL Every 4 Hours PRN 10/28/2017     Sig - Route: Take 3 mL by nebulization Every 4 (Four) Hours As Needed for Shortness of Air. - Nebulization    loperamide (IMODIUM) capsule 2 mg 2 mg 3 Times Daily PRN 10/29/2017     Sig - Route: Take 1 capsule by mouth 3 (Three) Times a Day As Needed for Diarrhea. - Oral    Magesium Sulfate 1 gram infusion - Mg 1.6-1.9 mg/dL 1 g Once 10/31/2017 10/31/2017    Sig - Route: Infuse 100 mL into a venous catheter 1 (One) Time. - Intravenous    magic barrier cream  As Needed 10/31/2017     Sig - Route: Apply  topically As Needed for skin irritation. - Topical    Cosign for Ordering: Required by Tong Carrera MD    Magnesium Sulfate 2 gram  "infusion- Mg 1.6 - 1.9 mg/dL 2 g As Needed 11/1/2017     Sig - Route: Infuse 50 mL into a venous catheter As Needed (Mg 1.6 - 1.9 mg/dL). - Intravenous    Linked Group 1:  \"Or\" Linked Group Details        magnesium sulfate 3 gram infusion (1gm x 3) - Mg 1.1 - 1.5 mg/dL 1 g As Needed 11/1/2017     Sig - Route: Infuse 100 mL into a venous catheter As Needed (Mg 1.1 - 1.5 mg/dL). - Intravenous    Linked Group 1:  \"Or\" Linked Group Details        magnesium sulfate 4 gram infusion- Mg less than or equal to 1 mg/dL 4 g As Needed 11/1/2017     Sig - Route: Infuse 100 mL into a venous catheter As Needed (Mg less than or equal to 1 mg/dL). - Intravenous    Linked Group 1:  \"Or\" Linked Group Details        methylPREDNISolone sodium succinate (SOLU-Medrol) injection 40 mg 40 mg Every 12 Hours 10/31/2017     Sig - Route: Infuse 1 mL into a venous catheter Every 12 (Twelve) Hours. - Intravenous    metroNIDAZOLE (FLAGYL) IVPB 500 mg 500 mg Every 8 Hours Scheduled 10/30/2017     Sig - Route: Infuse 100 mL into a venous catheter Every 8 (Eight) Hours. - Intravenous    pantoprazole (PROTONIX) EC tablet 40 mg 40 mg Every Morning 10/26/2017     Sig - Route: Take 1 tablet by mouth Every Morning. - Oral    Cosign for Ordering: Accepted by Zaira Oliveros MD on 10/26/2017 11:24 AM    potassium chloride (KLOR-CON) packet 40 mEq 40 mEq 3 Times Daily 10/31/2017 10/31/2017    Sig - Route: Take 40 mEq by mouth 3 (Three) Times a Day. - Oral    potassium chloride (KLOR-CON) packet 40 mEq 40 mEq As Needed 11/1/2017     Sig - Route: Take 40 mEq by mouth As Needed (potassium replacement, see admin instructions). - Oral    Linked Group 2:  \"Or\" Linked Group Details        potassium chloride (KLOR-CON) packet 40 mEq 40 mEq Once 11/1/2017     Sig - Route: Take 40 mEq by mouth 1 (One) Time. - Oral    potassium chloride (MICRO-K) CR capsule 40 mEq 40 mEq As Needed 11/1/2017     Sig - Route: Take 4 capsules by mouth As Needed (potassium " "replacement.  see admin instructions). - Oral    Linked Group 2:  \"Or\" Linked Group Details        potassium chloride 10 mEq in 100 mL IVPB 10 mEq Every 1 Hour PRN 11/1/2017     Sig - Route: Infuse 100 mL into a venous catheter Every 1 (One) Hour As Needed (potassium protocol PERIPHERAL - see admin instructions). - Intravenous    Linked Group 2:  \"Or\" Linked Group Details        Risaquad-2 capsule 1 capsule 1 capsule Daily 10/26/2017     Sig - Route: Take 1 capsule by mouth Daily. - Oral    rivaroxaban (XARELTO) tablet 15 mg 15 mg Daily With Dinner 10/29/2017     Sig - Route: Take 1 tablet by mouth Daily With Dinner. - Oral    rivastigmine (EXELON) 9.5 MG/24HR patch 1 patch 1 patch Daily 10/26/2017     Sig - Route: Place 1 patch on the skin Daily. - Transdermal    Cosign for Ordering: Accepted by Zaira Oliveros MD on 10/26/2017 11:24 AM    sennosides-docusate sodium (SENOKOT-S) 8.6-50 MG tablet 2 tablet 2 tablet Nightly 10/28/2017     Sig - Route: Take 2 tablets by mouth Every Night. - Oral    sodium chloride 0.9 % flush 1-10 mL 1-10 mL As Needed 10/26/2017     Sig - Route: Infuse 1-10 mL into a venous catheter As Needed for Line Care. - Intravenous    sodium chloride 0.9 % flush 10 mL 10 mL As Needed 10/26/2017     Sig - Route: Infuse 10 mL into a venous catheter As Needed for Line Care. - Intravenous    Linked Group 3:  \"And\" Linked Group Details        valsartan (DIOVAN) tablet 80 mg 80 mg Every 24 Hours Scheduled 11/1/2017     Sig - Route: Take 1 tablet by mouth Daily. - Oral    insulin detemir (LEVEMIR) injection 17 Units (Discontinued) 17 Units Nightly 10/30/2017 11/1/2017    Sig - Route: Inject 17 Units under the skin Every Night. - Subcutaneous    lisinopril (PRINIVIL,ZESTRIL) tablet 5 mg (Discontinued) 5 mg Every 24 Hours Scheduled 10/31/2017 11/1/2017    Sig - Route: Take 2 tablets by mouth Daily. - Oral    Magesium Sulfate 1 gram infusion - Mg 1.6-1.9 mg/dL (Discontinued) 1 g As Needed 10/26/2017 " "11/1/2017    Sig - Route: Infuse 100 mL into a venous catheter As Needed (Mg 1.6-1.9 mg/dL.). - Intravenous    Cosign for Ordering: Accepted by Zaira Oliveros MD on 10/26/2017 11:24 AM    Linked Group 4:  \"Or\" Linked Group Details        Magnesium Sulfate 2 gram infusion - Mg less than or equal to 1.5 mg/dL (Discontinued) 2 g As Needed 10/26/2017 11/1/2017    Sig - Route: Infuse 50 mL into a venous catheter As Needed (for Mg less than or equal 1.5 mg/dL). - Intravenous    Cosign for Ordering: Accepted by Zaira Oliveros MD on 10/26/2017 11:24 AM    Linked Group 4:  \"Or\" Linked Group Details                Lab Results (last 24 hours)     Procedure Component Value Units Date/Time    POC Glucose Fingerstick [[id number]]  (Abnormal) Collected:  10/31/17 1133    Specimen:  Blood Updated:  10/31/17 1145     Glucose 148 (H) mg/dL     Narrative:       Meter: [id number] : [id number] Boyd Mims    BNP [[id number]]  (Abnormal) Collected:  10/31/17 1102    Specimen:  Blood Updated:  10/31/17 1153     .0 (H) pg/mL     Blood Culture - Blood, [[id number]]  (Normal) Collected:  10/28/17 0927    Specimen:  Blood from Hand, Left Updated:  10/31/17 1416     Blood Culture No growth at 3 days    POC Glucose Fingerstick [[id number]]  (Abnormal) Collected:  10/31/17 1733    Specimen:  Blood Updated:  10/31/17 1744     Glucose 308 (H) mg/dL     Narrative:       Meter: [id number] : [id number] [facility]    POC Glucose Fingerstick [[id number]]  (Abnormal) Collected:  10/31/17 1817    Specimen:  Blood Updated:  10/31/17 1824     Glucose 329 (H) mg/dL     Narrative:       Meter: [id number] : [id number] [facility] [place]    POC Glucose Fingerstick [[id number]]  (Abnormal) Collected:  10/31/17 2003    Specimen:  Blood Updated:  10/31/17 2009     Glucose 257 (H) mg/dL     Narrative:       Meter: [id number] : [id number] SUSAN AGUILAR    POC Glucose Fingerstick [[id number]]  (Abnormal) Collected:  11/01/17 0718    Specimen:  " Blood Updated:  11/01/17 0725     Glucose 245 (H) mg/dL     Narrative:       Meter: [id number] : [id number] Oliver Mims    Blood Culture - Blood, [[id number]]  (Normal) Collected:  10/28/17 0811    Specimen:  Blood from Hand, Right Updated:  11/01/17 0946     Blood Culture No growth at 4 days    CBC Auto Differential [[id number]]  (Abnormal) Collected:  11/01/17 0954    Specimen:  Blood Updated:  11/01/17 1023     WBC 15.30 (H) 10*3/mm3      RBC 4.64 10*6/mm3      Hemoglobin 12.3 g/dL      Hematocrit 39.4 %      MCV 84.9 fL      MCH 26.5 (L) pg      MCHC 31.2 (L) g/dL      RDW 18.4 (H) %      RDW-SD 57.1 (H) fl      MPV 10.8 (H) fL      Platelets 290 10*3/mm3      Neutrophil % 84.8 (H) %      Lymphocyte % 8.4 (L) %      Monocyte % 6.1 %      Eosinophil % 0.0 %      Basophil % 0.2 %      Immature Grans % 0.5 %      Neutrophils, Absolute 12.97 (H) 10*3/mm3      Lymphocytes, Absolute 1.29 10*3/mm3      Monocytes, Absolute 0.93 (H) 10*3/mm3      Eosinophils, Absolute 0.00 10*3/mm3      Basophils, Absolute 0.03 10*3/mm3      Immature Grans, Absolute 0.08 (H) 10*3/mm3     Magnesium [[id number]]  (Normal) Collected:  11/01/17 0954    Specimen:  Blood Updated:  11/01/17 1029     Magnesium 1.8 mg/dL     Basic Metabolic Panel [[id number]]  (Abnormal) Collected:  11/01/17 0954    Specimen:  Blood Updated:  11/01/17 1031     Glucose 342 (H) mg/dL      BUN 22 (H) mg/dL      Creatinine 0.92 mg/dL      Sodium 138 mmol/L      Potassium 4.4 mmol/L      Chloride 99 mmol/L      CO2 30.5 mmol/L      Calcium 8.8 mg/dL      eGFR Non African Amer 58 (L) mL/min/1.73      BUN/Creatinine Ratio 23.9     Anion Gap 8.5 mmol/L     Narrative:       The MDRD GFR formula is only valid for adults with stable renal function between ages 18 and 70.    Osmolality, Calculated [[id number]]  (Normal) Collected:  11/01/17 0954    Specimen:  Blood Updated:  11/01/17 1032     Osmolality Calc 292.5 mOsm/kg     CBC & Differential [[id number]] Collected:   11/01/17 0954    Specimen:  Blood Updated:  11/01/17 1037    Narrative:       The following orders were created for panel order CBC & Differential.  Procedure                               Abnormality         Status                     ---------                               -----------         ------                     Scan Slide[[id number]]                                       Final result               CBC Auto Differential[[id number]]        Abnormal            Final result                 Please view results for these tests on the individual orders.    Scan Slide [[id number]] Collected:  11/01/17 0954    Specimen:  Blood Updated:  11/01/17 1037     Anisocytosis Mod/2+     Hypochromia Slight/1+     Platelet Morphology Normal        Imaging Results (last 24 hours)     Procedure Component Value Units Date/Time    XR Chest AP [[id number]] Collected:  11/01/17 0713     Updated:  11/01/17 0844    Narrative:       XR CHEST AP-     CLINICAL INDICATION: eval chf; I21.4-Non-ST elevation (NSTEMI)  myocardial infarction; N30.00-Acute cystitis without hematuria.          COMPARISON: 10/30/2017.      TECHNIQUE: Single frontal view of the chest.     FINDINGS:     Left effusion and left basilar consolidation.  CHF.  There is no evidence of an acute osseous abnormality.   There are no suspicious-appearing parenchymal soft tissue nodules.            Impression:       Left effusion and left-sided consolidation and CHF         This report was finalized on 11/1/2017 8:42 AM by Dr. Daryl Rosas MD.           Operative/Procedure Notes (last 24 hours) (Notes from 10/31/2017 11:00 AM through 11/1/2017 11:00 AM)     No notes of this type exist for this encounter.           Physician Progress Notes (last 24 hours) (Notes from 10/31/2017 11:00 AM through 11/1/2017 11:00 AM)      Jann Calhoun MD at 10/31/2017  5:28 PM  Version 1 of 1         TODAY'S DATE   10/31/17      SUBJECTIVE:  Denies any chest pain or new SOB. Family (daughter  and grand daughter)  Present at bedside.    PHYSICAL EXAM:    General Appearance:    Alert, cooperative, in no acute distress   Head:    Normocephalic, without obvious abnormality, atraumatic   Throat:   No oral lesions, no thrush, oral mucosa moist   Neck:   Supple,no JVD   Lungs:     Coarse breath sounds b/l, respirations regular    Heart:    Regular rhythm and normal rate, normal S1 and S2, no            murmur, no gallop, no rub, no click   Chest Wall:    No abnormalities observed   Abdomen:     Normal bowel sounds, soft, non-tender, obese   Extremities:   Moves all extremities well   Pulses:   Pulses palpable and equal bilaterally       Neurologic:   A & O x 2 (place and person)           Allergies   Allergen Reactions   • Codeine    • Metformin And Related        Past Medical History:   Diagnosis Date   • Dementia    • Dementia    • Diabetes mellitus    • Hypertension    • Pancreatitis        Past Surgical History:   Procedure Laterality Date   • ANKLE SURGERY     • HYSTERECTOMY     • SHOULDER SURGERY     • TYMPANOPLASTY         Social History     Social History   • Marital status:      Spouse name: N/A   • Number of children: N/A   • Years of education: N/A     Occupational History   • Not on file.     Social History Main Topics   • Smoking status: Never Smoker   • Smokeless tobacco: Never Used   • Alcohol use No   • Drug use: No   • Sexual activity: Defer     Other Topics Concern   • Not on file     Social History Narrative       History reviewed. No pertinent family history.    Patient Active Problem List   Diagnosis   • NSTEMI (non-ST elevated myocardial infarction)   • Essential hypertension   • Type II diabetes mellitus   • Dementia   • Sepsis secondary to UTI   • Pulmonary nodules, bilateral   • RBBB   • Chronic right basal ganglia lacunar infarct on previous head CT   • Advanced age   • Hiatal hernia   • GERD (gastroesophageal reflux disease)   • Acute respiratory failure with hypoxia   • Sepsis  due to bilateral pneumonia        Vital Signs (last 24 hours)       10/30 0700  -  10/31 0659 10/31 0700  -  10/31 1728   Most Recent    Temp (°F) 97.8 -  98.5    97.8 -  98.1     97.8 (36.6)    Heart Rate 67 -  85    69 -  89     74    Resp 18 -  (!)36    18 -  26     19    /70 -  155/67    135/75 -  164/77     136/69    SpO2 (%) 93 -  98    92 -  98     95          LABS:    Results from last 7 days  Lab Units 10/31/17  0331 10/30/17  0054 10/28/17  0029 10/27/17  0850 10/26/17  0250   WBC 10*3/mm3 7.99 8.59 7.41 7.45 11.02   HEMOGLOBIN g/dL 11.8* 11.1* 10.9* 11.4* 13.2   PLATELETS 10*3/mm3 205 159 152 147 212       Results from last 7 days  Lab Units 10/31/17  0331 10/30/17  0054 10/28/17  0029 10/27/17  0850 10/26/17  0250   SODIUM mmol/L 141 138 138 137 137   POTASSIUM mmol/L 3.6 3.9 4.1 4.2 3.9   CHLORIDE mmol/L 104 106 108 106 101   CO2 mmol/L 31.3 26.0 26.7 25.8 26.3   BUN mg/dL 11 11 13 14 14   CREATININE mg/dL 0.78 0.72 0.69 0.71 0.90   CALCIUM mg/dL 8.8 8.5 7.9 8.2 9.3   GLUCOSE mg/dL 140* 197* 180* 159* 248*       Results from last 7 days  Lab Units 10/31/17  0331 10/26/17  0250   BILIRUBIN mg/dL 0.4 0.7   ALK PHOS U/L 64 70   AST (SGOT) U/L 21 26   ALT (SGPT) U/L 19 18       Results from last 7 days  Lab Units 10/31/17  0331 10/28/17  0029 10/27/17  0330 10/26/17  0530   MAGNESIUM mg/dL 1.6* 1.9 2.2 1.6*       Results from last 7 days  Lab Units 10/26/17  0530   INR  1.14*       Results from last 7 days  Lab Units 10/29/17  0032 10/28/17  0029 10/27/17  0853 10/26/17  2114 10/26/17  1514 10/26/17  1037   CK TOTAL U/L 49 96 211* 265* 235* 203*   TROPONIN I ng/mL 2.389* 8.083* 16.021* 13.235* 11.444* 8.245*   CKMB ng/mL 4.03 11.08* 26.32* 31.58* 26.35* 28.68*   MYOGLOBIN ng/mL  --   --   --  130.0* 160.0* 163.0*       No results found for: HGBA1C  Glucose   Date/Time Value Ref Range Status   10/31/2017 1133 148 (H) 70 - 130 mg/dL Final   10/31/2017 0730 174 (H) 70 - 130 mg/dL Final   10/30/2017 2130  269 (H) 70 - 130 mg/dL Final   10/30/2017 1811 138 (H) 70 - 130 mg/dL Final   10/30/2017 1356 171 (H) 70 - 130 mg/dL Final   10/30/2017 1129 168 (H) 70 - 130 mg/dL Final   10/30/2017 0721 203 (H) 70 - 130 mg/dL Final   10/29/2017 2032 222 (H) 70 - 130 mg/dL Final         amiodarone 200 mg Oral TID   atorvastatin 80 mg Oral Nightly   carvedilol 6.25 mg Oral BID With Meals   ceftriaxone 1 g Intravenous Q24H   cetirizine 5 mg Oral Daily   cholecalciferol 50,000 Units Oral Weekly   clopidogrel 75 mg Oral Daily   ferrous sulfate 325 mg Oral Daily With Breakfast   furosemide 40 mg Intravenous Q12H   hydrocortisone  Rectal BID   insulin aspart 0-7 Units Subcutaneous 4x Daily AC & at Bedtime   insulin aspart 0-7 Units Subcutaneous Once   insulin aspart 5 Units Subcutaneous TID With Meals   insulin detemir 17 Units Subcutaneous Nightly   ipratropium 0.5 mg Nebulization 4x Daily - RT   lisinopril 5 mg Oral Q24H   methylPREDNISolone sodium succinate 40 mg Intravenous Q12H   metroNIDAZOLE 500 mg Intravenous Q8H   pantoprazole 40 mg Oral QAM   potassium chloride 40 mEq Oral TID   Risaquad-2 1 capsule Oral Daily   rivaroxaban 15 mg Oral Daily With Dinner   rivastigmine 1 patch Transdermal Daily   sennosides-docusate sodium 2 tablet Oral Nightly      Echo 10/26/2017:  · The left ventricular cavity is borderline dilated.  · Left ventricular systolic function is mildly decreased. Estimated EF = 40%. The LAD territory is severely hypokinetic  · Mild to moderate tricuspid valve regurgitation is present.      ASSESSMENT/PLAN:  1) Coronary artery disease/non-ST elevation myocardial infarction:  Review of Dr. Cotto's notes and on discussion with family, conservative management has been opted. Management optyions again reviewed with them today and they wish to continue with medical treatment only. Please call as needed.  Continue Plavix, atorvastatin, Coreg. Her ASA was stopped by Dr. Cotto due to concomitant rivaroxaban.     2)  Ischemic Cardiomyopathy/CHF. Management as per #1. On BB, ACE-I. Diuretics as tolerated by BP.    3) A fib: currently in NSR. On oral amiodarone and anticoagulation ([id number] at least 6). Keep K around 4 and Mg close to 2.    4) Sepsis/Pneumonia/UTI Management as per Internal Medicine.              Jann Calhoun MD     Electronically signed by Jann Calhoun MD at 10/31/2017  5:45 PM      Tong Carrera MD at 11/1/2017  9:18 AM  Version 1 of 1           I have seen the patient in conjunction with Juana ROBLERO and daughter    History of presenting illness:  History is from the patient as well as the patient's daughter.  When I entered the room patient had a significant coughing paroxysm.  She had an open milk and orange juice on the table.  Patient's daughter states the patient has been coughing for as long she can remember.  They would carry around the halls cough drops and anytime she ate she states that she would have coughing.  Patient herself does not remember eating breakfast due to her dementia but she is awake and alert.  Yesterday a Sales catheter had to be placed due to increasing perineal erythema. Purwik was tried at lead nursing order.    Vital Signs  Temp:  [97.7 °F (36.5 °C)-98.3 °F (36.8 °C)] 98.3 °F (36.8 °C)  Heart Rate:  [63-90] 90  Resp:  [16-29] 23  BP: (110-164)/(50-86) 113/50  Body mass index is 35.77 kg/(m^2).      Intake/Output Summary (Last 24 hours) at 11/01/17 0919  Last data filed at 11/01/17 0300   Gross per 24 hour   Intake              350 ml   Output              935 ml   Net             -585 ml     With Sales catheter patient has had 935 cc out overnight.    Intake & Output (last 3 days)       10/29 0701 - 10/30 0700 10/30 0701 - 10/31 0700 10/31 0701 - 11/01 0700 11/01 0701 - 11/02 0700    P.O. 710 120 540     I.V. (mL/kg)        IV Piggyback 300 250 50     Total Intake(mL/kg) 1010 (12.3) 370 (4.5) 590 (7.3)     Urine (mL/kg/hr)  0 (0) 935 (0.5)     Stool  0 (0)      Total  Output   0 935      Net +1010 +370 -345              Unmeasured Urine Occurrence 5 x 7 x      Unmeasured Stool Occurrence 8 x 2 x  1 x          Physical exam:  Physical Exam   Constitutional: Well-developed, well-nourished.  Awake, on current oxygen saturations 93%  Head: Normocephalic and atraumatic.  Hearing appears to be intact, mucous membranes are moist.  Speech is normal  Eyes:  Pupils are equal, round,  No scleral icterus.   Cardiovascular: Normal rate, regular rhythm and normal heart sounds.  No murmur rub or gallop  Pulmonary/Chest: Bilateral breath sounds modest air excursion but with some end-expiratory wheezing heard bilaterally   Abdominal: Soft, flat, bowel sounds are active, nondistended nontender.  No peritoneal signs   Musculoskeletal: Strength is symmetric she moves all extremities on command ,No edema. she has good palpable distal pulses   Neurological: Nonfocal, alert.    Skin: Skin is warm and dry.   Psychiatric:  Normal mood and affect.   Sales catheter is in place medium yellow clear urine    Telemetry: sinus, reviewed    Results Review:  Lab Results   Component Value Date    WBC 7.99 10/31/2017    HGB 11.8 (L) 10/31/2017    HCT 38.3 10/31/2017    MCV 85.7 10/31/2017     10/31/2017     Lab Results   Component Value Date    GLUCOSE 140 (H) 10/31/2017    BUN 11 10/31/2017    CREATININE 0.78 10/31/2017    EGFRIFNONA 71 10/31/2017    BCR 14.1 10/31/2017    CO2 31.3 10/31/2017    CALCIUM 8.8 10/31/2017    ALBUMIN 3.30 (L) 10/31/2017    LABIL2 1.2 (L) 10/31/2017    AST 21 10/31/2017    ALT 19 10/31/2017       Imaging Results (last 72 hours)     Procedure Component Value Units Date/Time    XR Chest 1 View [[id number]] Collected:  10/28/17 0925     Updated:  10/28/17 0928    Narrative:       Technique: Frontal view the chest.     COMPARISON:  10/26/2017     INDICATION:     fever; I21.4-Non-ST elevation (NSTEMI) myocardial  infarction; N30.00-Acute cystitis without hematuria      FINDINGS:     Coarsened interstitial markings. Cardiomegaly is noted.  Prominent interstitial markings are noted. There are small bilateral  pleural effusions.        Impression:       Radiographic changes of congestive failure.     This report was finalized on 10/28/2017 9:26 AM by Dr. Harshal Stoddard MD.       XR Chest AP [[id number]] Collected:  10/30/17 0701     Updated:  10/30/17 0751    Narrative:       XR CHEST AP-     HISTORY:  CHF; I21.4-Non-ST elevation (NSTEMI) myocardial infarction;  N30.00-Acute cystitis without hematuria          COMPARISON: 10/28/2017      TECHNIQUE: Single frontal view of the chest.     FINDINGS:     Bibasilar consolidation.  Mild CHF.  There is no evidence of an acute osseous abnormality.   There are no suspicious-appearing parenchymal soft tissue nodules.            Impression:       Bibasilar consolidation and mild CHF.         This report was finalized on 10/30/2017 7:49 AM by Dr. Daryl Rosas MD.         Echo EF 40 %    Chest x-ray reviewed, some fluid in the right fissure, pulmonary congestion better but still significant consolidation left lung      Assessment/Plan     Active Problems:  NSTEMI (non-ST elevated myocardial infarction), being medically managed, daughter is in agreement with this. Patient is on Plavix, beta blocker, and high-dose statin.  She is asymptomatic.      Persistent cough, she does have a reduced ejection fraction and although lisinopril was just added I'm changing this to Diovan.  I'm still concerned that she may be having silent aspiration and may have been having this for prolonged period of time or at least deep penetration of fluids.  I've changed her to nectar thick liquids and contacted speech therapy and asked him to follow-up again.    Systolic congestive heart failure, patient is on schedule Lasix, electrolytes unremarkable, ACE inhibitor change to ARB for above reasons.  Chest x-ray shows improving congestion.  Patient continues on IV Lasix.    Pneumonia,  patient is now on Rocephin/Flagyl.  White count is normal, CRP continues to come down.  Blood Cultures remain negative    UTI, Klebsiella, Rocephin , organism should be sensitive to this     Severe Sepsis secondary to pneumonia and UTI    Acute hypoxic respiratory failure secondary to*congestive heart failure in combination with pneumonia.        Paroxysmal atrial fibrillation, now in sinus rhythm, she has been changed to oral amiodarone andon xarelto for stroke prevention    Functional decline , progressing well with therapy    Baseline dementia    Diabetes on steroids glucoses high, insulin was again adjusted.    Hypomagnesemia, replaced, electrolytes for this a.m. are pending.  Patient is on potassium and magnesium protocols.    Tong Carrera MD  11/01/17  9:19 AM       Electronically signed by Tong Carrera MD at 11/1/2017  9:29 AM      LILY Arce at 11/1/2017  9:36 AM  Version 2 of 2          LOS: 6 days     Chief Complaint:  Pulmonology is following for pulmonary nodule    Subjective     Interval History: patient is resting in bed.  No family present at this time.    History taken from: chart RN    Review of Systems:     Review of Systems - History obtained from unobtainable from patient due to mental status                    Objective     Vital Signs  Temp:  [97.7 °F (36.5 °C)-98.3 °F (36.8 °C)] 98.3 °F (36.8 °C)  Heart Rate:  [63-90] 90  Resp:  [16-29] 23  BP: (110-164)/(50-86) 113/50  Body mass index is 35.77 kg/(m^2).    Intake/Output Summary (Last 24 hours) at 11/01/17 0936  Last data filed at 11/01/17 0300   Gross per 24 hour   Intake              350 ml   Output              935 ml   Net             -585 ml          Physical Exam:  GENERAL APPEARANCE: Well developed, well nourished, alert and cooperative, and appears to be in no acute distress.    HEAD: normocephalic.    EYES: PERRL    NECK: Neck supple.     CARDIAC: Normal S1 and S2. No S3, S4 or murmurs. Rhythm is regular. There  is no peripheral edema, cyanosis or pallor. Extremities are warm and well perfused. Capillary refill is less than 2 seconds. No carotid bruits.    RESPIRATORY: Clear to auscultation and percussion without rales, rhonchi, wheezing or diminished breath sounds.    GI: Positive bowel sounds. Soft, nondistended, nontender.     MUSCULOSKELTAL: No significant deformity or joint abnormality. No edema. Peripheral pulses intact.     NEUROLOGICAL: Unable to assess due to confusion.    PSYCHIATRIC: The mental examination revealed the patient was oriented to person.                 Results Review:                I reviewed the patient's new clinical results.  I reviewed the patient's new imaging results and agree with the interpretation.    Results from last 7 days  Lab Units 10/31/17  0331 10/30/17  0054 10/28/17  0029   WBC 10*3/mm3 7.99 8.59 7.41   HEMOGLOBIN g/dL 11.8* 11.1* 10.9*   PLATELETS 10*3/mm3 205 159 152       Results from last 7 days  Lab Units 10/31/17  0331 10/30/17  0054 10/28/17  0029  10/27/17  0330   SODIUM mmol/L 141 138 138  < >  --    POTASSIUM mmol/L 3.6 3.9 4.1  < >  --    CHLORIDE mmol/L 104 106 108  < >  --    CO2 mmol/L 31.3 26.0 26.7  < >  --    BUN mg/dL 11 11 13  < >  --    CREATININE mg/dL 0.78 0.72 0.69  < >  --    CALCIUM mg/dL 8.8 8.5 7.9  < >  --    GLUCOSE mg/dL 140* 197* 180*  < >  --    MAGNESIUM mg/dL 1.6*  --  1.9  --  2.2   < > = values in this interval not displayed.  Lab Results   Component Value Date    INR 1.14 (H) 10/26/2017    PROTIME 14.8 10/26/2017       Results from last 7 days  Lab Units 10/31/17  0331 10/26/17  0250   ALK PHOS U/L 64 70   BILIRUBIN mg/dL 0.4 0.7   ALT (SGPT) U/L 19 18   AST (SGOT) U/L 21 26       Results from last 7 days  Lab Units 10/26/17  0236   PH, ARTERIAL pH units 7.390   PO2 ART mm Hg 46.5*   PCO2, ARTERIAL mm Hg 42.7   HCO3 ART mmol/L 25.3     Imaging Results (last 24 hours)     Procedure Component Value Units Date/Time    XR Chest AP [[id number]]  Collected:  11/01/17 0713     Updated:  11/01/17 0844    Narrative:       XR CHEST AP-     CLINICAL INDICATION: eval chf; I21.4-Non-ST elevation (NSTEMI)  myocardial infarction; N30.00-Acute cystitis without hematuria.          COMPARISON: 10/30/2017.      TECHNIQUE: Single frontal view of the chest.     FINDINGS:     Left effusion and left basilar consolidation.  CHF.  There is no evidence of an acute osseous abnormality.   There are no suspicious-appearing parenchymal soft tissue nodules.            Impression:       Left effusion and left-sided consolidation and CHF         This report was finalized on 11/1/2017 8:42 AM by Dr. Daryl Rosas MD.                Medication Review:   Scheduled Medications:    amiodarone 200 mg Oral TID   atorvastatin 80 mg Oral Nightly   carvedilol 6.25 mg Oral BID With Meals   ceftriaxone 1 g Intravenous Q24H   cetirizine 5 mg Oral Daily   cholecalciferol 50,000 Units Oral Weekly   clopidogrel 75 mg Oral Daily   ferrous sulfate 325 mg Oral Daily With Breakfast   furosemide 40 mg Intravenous Q12H   hydrocortisone  Rectal BID   insulin aspart 0-7 Units Subcutaneous 4x Daily AC & at Bedtime   insulin aspart 0-7 Units Subcutaneous Once   insulin aspart 5 Units Subcutaneous TID With Meals   insulin detemir 25 Units Subcutaneous Nightly   ipratropium 0.5 mg Nebulization 4x Daily - RT   methylPREDNISolone sodium succinate 40 mg Intravenous Q12H   metroNIDAZOLE 500 mg Intravenous Q8H   pantoprazole 40 mg Oral QAM   Risaquad-2 1 capsule Oral Daily   rivaroxaban 15 mg Oral Daily With Dinner   rivastigmine 1 patch Transdermal Daily   sennosides-docusate sodium 2 tablet Oral Nightly   valsartan 80 mg Oral Q24H     Continuous infusions:       Assessment/Plan       Respiratory failure/hypoxia:  Patient's wheezing has improved this morning.    She is coughing more but still unable to cough up secretions.    CXR reviewed- congestion is better, but consolidation appears to have worsened.    Continue  IV antibiotics.    Continue lasix BID.  Ordered CBC and BMP  Ordered extra dose of lasix 80 mg IV and potassium 40 mEq BID.    Will start patient on mucomyst neb treatments and order a sputum culture.    Continue scheduled inhalants and PRN neb treatments.    Pulmonary nodules:  3 nodules measuring 7 mm, 6 mm, and 8 mm.    Will follow patient on the outpatient basis to monitor nodules.    Hypomagnesemia:  Ordered mag level.    Patient Active Problem List   Diagnosis Code   • NSTEMI (non-ST elevated myocardial infarction) I21.4   • Essential hypertension I10   • Type II diabetes mellitus E11.9   • Dementia F03.90   • Sepsis secondary to UTI A41.9, N39.0   • Pulmonary nodules, bilateral R91.8   • RBBB I45.10   • Chronic right basal ganglia lacunar infarct on previous head CT I63.9   • Advanced age R54   • Hiatal hernia K44.9   • GERD (gastroesophageal reflux disease) K21.9   • Acute respiratory failure with hypoxia J96.01   • Sepsis due to bilateral pneumonia J18.9, A41.9             LILY Arce  11/01/17  9:36 AM        Attestation: Scribed for Dr. Nash, by LILY Marrero         Electronically signed by LILY Arce at 11/1/2017 10:43 AM      LILY Arce at 11/1/2017  9:36 AM  Version 1 of 2          LOS: 6 days     Chief Complaint:  Pulmonology is following for pulmonary nodule    Subjective     Interval History: patient is resting in bed.  No family present at this time.    History taken from: chart RN    Review of Systems:     Review of Systems - History obtained from unobtainable from patient due to mental status                    Objective     Vital Signs  Temp:  [97.7 °F (36.5 °C)-98.3 °F (36.8 °C)] 98.3 °F (36.8 °C)  Heart Rate:  [63-90] 90  Resp:  [16-29] 23  BP: (110-164)/(50-86) 113/50  Body mass index is 35.77 kg/(m^2).    Intake/Output Summary (Last 24 hours) at 11/01/17 0936  Last data filed at 11/01/17 0300   Gross per 24 hour   Intake               350 ml   Output              935 ml   Net             -585 ml          Physical Exam:  GENERAL APPEARANCE: Well developed, well nourished, alert and cooperative, and appears to be in no acute distress.    HEAD: normocephalic.    EYES: PERRL    NECK: Neck supple.     CARDIAC: Normal S1 and S2. No S3, S4 or murmurs. Rhythm is regular. There is no peripheral edema, cyanosis or pallor. Extremities are warm and well perfused. Capillary refill is less than 2 seconds. No carotid bruits.    RESPIRATORY: Clear to auscultation and percussion without rales, rhonchi, wheezing or diminished breath sounds.    GI: Positive bowel sounds. Soft, nondistended, nontender.     MUSCULOSKELTAL: No significant deformity or joint abnormality. No edema. Peripheral pulses intact.     NEUROLOGICAL: Unable to assess due to confusion.    PSYCHIATRIC: The mental examination revealed the patient was oriented to person.                 Results Review:                I reviewed the patient's new clinical results.  I reviewed the patient's new imaging results and agree with the interpretation.    Results from last 7 days  Lab Units 10/31/17  0331 10/30/17  0054 10/28/17  0029   WBC 10*3/mm3 7.99 8.59 7.41   HEMOGLOBIN g/dL 11.8* 11.1* 10.9*   PLATELETS 10*3/mm3 205 159 152       Results from last 7 days  Lab Units 10/31/17  0331 10/30/17  0054 10/28/17  0029  10/27/17  0330   SODIUM mmol/L 141 138 138  < >  --    POTASSIUM mmol/L 3.6 3.9 4.1  < >  --    CHLORIDE mmol/L 104 106 108  < >  --    CO2 mmol/L 31.3 26.0 26.7  < >  --    BUN mg/dL 11 11 13  < >  --    CREATININE mg/dL 0.78 0.72 0.69  < >  --    CALCIUM mg/dL 8.8 8.5 7.9  < >  --    GLUCOSE mg/dL 140* 197* 180*  < >  --    MAGNESIUM mg/dL 1.6*  --  1.9  --  2.2   < > = values in this interval not displayed.  Lab Results   Component Value Date    INR 1.14 (H) 10/26/2017    PROTIME 14.8 10/26/2017       Results from last 7 days  Lab Units 10/31/17  0331 10/26/17  0250   ALK PHOS U/L 64 70    BILIRUBIN mg/dL 0.4 0.7   ALT (SGPT) U/L 19 18   AST (SGOT) U/L 21 26       Results from last 7 days  Lab Units 10/26/17  0236   PH, ARTERIAL pH units 7.390   PO2 ART mm Hg 46.5*   PCO2, ARTERIAL mm Hg 42.7   HCO3 ART mmol/L 25.3     Imaging Results (last 24 hours)     Procedure Component Value Units Date/Time    XR Chest AP [[id number]] Collected:  11/01/17 0713     Updated:  11/01/17 0844    Narrative:       XR CHEST AP-     CLINICAL INDICATION: eval chf; I21.4-Non-ST elevation (NSTEMI)  myocardial infarction; N30.00-Acute cystitis without hematuria.          COMPARISON: 10/30/2017.      TECHNIQUE: Single frontal view of the chest.     FINDINGS:     Left effusion and left basilar consolidation.  CHF.  There is no evidence of an acute osseous abnormality.   There are no suspicious-appearing parenchymal soft tissue nodules.            Impression:       Left effusion and left-sided consolidation and CHF         This report was finalized on 11/1/2017 8:42 AM by Dr. Daryl Rosas MD.                Medication Review:   Scheduled Medications:    amiodarone 200 mg Oral TID   atorvastatin 80 mg Oral Nightly   carvedilol 6.25 mg Oral BID With Meals   ceftriaxone 1 g Intravenous Q24H   cetirizine 5 mg Oral Daily   cholecalciferol 50,000 Units Oral Weekly   clopidogrel 75 mg Oral Daily   ferrous sulfate 325 mg Oral Daily With Breakfast   furosemide 40 mg Intravenous Q12H   hydrocortisone  Rectal BID   insulin aspart 0-7 Units Subcutaneous 4x Daily AC & at Bedtime   insulin aspart 0-7 Units Subcutaneous Once   insulin aspart 5 Units Subcutaneous TID With Meals   insulin detemir 25 Units Subcutaneous Nightly   ipratropium 0.5 mg Nebulization 4x Daily - RT   methylPREDNISolone sodium succinate 40 mg Intravenous Q12H   metroNIDAZOLE 500 mg Intravenous Q8H   pantoprazole 40 mg Oral QAM   Risaquad-2 1 capsule Oral Daily   rivaroxaban 15 mg Oral Daily With Dinner   rivastigmine 1 patch Transdermal Daily   sennosides-docusate  sodium 2 tablet Oral Nightly   valsartan 80 mg Oral Q24H     Continuous infusions:       Assessment/Plan       Respiratory failure/hypoxia:  Patient's wheezing has improved this morning.    She is coughing more but still unable to cough up secretions.    CXR reviewed- congestion is better, but consolidation appears to have worsened.    Continue IV antibiotics.    Continue lasix BID.  Ordered CBC and BMP    Continue scheduled inhalants and PRN neb treatments.    Pulmonary nodules:  3 nodules measuring 7 mm, 6 mm, and 8 mm.    Will follow patient on the outpatient basis to monitor nodules.    Hypomagnesemia:  Ordered mag level.    Patient Active Problem List   Diagnosis Code   • NSTEMI (non-ST elevated myocardial infarction) I21.4   • Essential hypertension I10   • Type II diabetes mellitus E11.9   • Dementia F03.90   • Sepsis secondary to UTI A41.9, N39.0   • Pulmonary nodules, bilateral R91.8   • RBBB I45.10   • Chronic right basal ganglia lacunar infarct on previous head CT I63.9   • Advanced age R54   • Hiatal hernia K44.9   • GERD (gastroesophageal reflux disease) K21.9   • Acute respiratory failure with hypoxia J96.01   • Sepsis due to bilateral pneumonia J18.9, A41.9             LILY Arce  17  9:36 AM        Attestation: Scribed for Dr. Nash, by LILY Marrero         Electronically signed by LILY Arce at 2017  9:45 AM        Consult Notes (last 24 hours) (Notes from 10/31/2017 11:00 AM through 2017 11:00 AM)     No notes of this type exist for this encounter.           Physical Therapy Notes (last 24 hours) (Notes from 10/31/2017 11:00 AM through 2017 11:00 AM)      Zara Israel, PT at 2017 10:23 AM  Version 1 of 1         Acute Care - Physical Therapy Treatment Note  DEQUAN Vasquez     Patient Name: Floridalma Braynt  : 1934  MRN: [id number]  Today's Date: 2017  Onset of Illness/Injury or Date of Surgery Date: 10/26/17  Date of  Referral to PT: 10/30/17  Referring Physician: Debi    Admit Date: 10/26/2017    Visit Dx:    ICD-10-CM ICD-9-CM   1. NSTEMI (non-ST elevated myocardial infarction) I21.4 410.70   2. Acute cystitis without hematuria N30.00 595.0     Patient Active Problem List   Diagnosis   • NSTEMI (non-ST elevated myocardial infarction)   • Essential hypertension   • Type II diabetes mellitus   • Dementia   • Sepsis secondary to UTI   • Pulmonary nodules, bilateral   • RBBB   • Chronic right basal ganglia lacunar infarct on previous head CT   • Advanced age   • Hiatal hernia   • GERD (gastroesophageal reflux disease)   • Acute respiratory failure with hypoxia   • Sepsis due to bilateral pneumonia               Adult Rehabilitation Note       11/01/17 1016 10/31/17 1044       Rehab Assessment/Intervention    Discipline physical therapist  -CT physical therapist  -CT     Document Type therapy note (daily note)  -CT therapy note (daily note)  -CT     Subjective Information agree to therapy;complains of;weakness  -CT agree to therapy;complains of;weakness  -CT     Patient Effort, Rehab Treatment adequate  -CT adequate  -CT     Symptoms Noted During/After Treatment fatigue  -CT fatigue  -CT     Precautions/Limitations fall precautions;oxygen therapy device and L/min   foly cath  -CT fall precautions;oxygen therapy device and L/min  -CT     Patient Response to Treatment Pt tolerated treatment session well with rest breaks provided as needed.   -CT Pt tolerated treatment session fairly well with rest breaks provided as needed. Pt only able to ambulate to and from the bathroom today with increased fatigue.   -CT     Recorded by [CT] Zara Israel, PT [CT] Zara Israel PT     Cognitive Assessment/Intervention    Current Cognitive/Communication Assessment functional  -CT functional  -CT     Orientation Status oriented to;person  -CT oriented to;person  -CT     Follows Commands/Answers Questions able to follow single-step  instructions;100% of the time  -CT      Personal Safety decreased awareness, need for assist;decreased awareness, need for safety  -CT decreased awareness, need for assist;decreased awareness, need for safety  -CT     Personal Safety Interventions fall prevention program maintained;gait belt;muscle strengthening facilitated;nonskid shoes/slippers when out of bed  -CT fall prevention program maintained;gait belt;muscle strengthening facilitated;nonskid shoes/slippers when out of bed  -CT     Recorded by [CT] Zara Israel, PT [CT] Zara Israel PT     Bed Mobility, Assessment/Treatment    Bed Mobility, Scoot/Bridge, Bay moderate assist (50% patient effort)  -CT moderate assist (50% patient effort)  -CT     Bed Mob, Supine to Sit, Bay moderate assist (50% patient effort)  -CT moderate assist (50% patient effort)  -CT     Bed Mobility, Safety Issues decreased use of arms for pushing/pulling;decreased use of legs for bridging/pushing  -CT decreased use of arms for pushing/pulling;decreased use of legs for bridging/pushing  -CT     Recorded by [CT] Zara Israel, PT [CT] Zara Israel PT     Transfer Assessment/Treatment    Transfers, Sit-Stand Bay minimum assist (75% patient effort)  -CT minimum assist (75% patient effort)  -CT     Transfers, Stand-Sit [place] minimum assist (75% patient effort)  -CT minimum assist (75% patient effort)  -CT     Transfers, Sit-Stand-Sit, Assist Device rolling walker  -CT rolling walker  -CT     Transfer, Impairments strength decreased  -CT strength decreased  -CT     Recorded by [CT] Zara Israel, PT [CT] Zara Israel PT     Gait Assessment/Treatment    Gait, Bay Level moderate assist (50% patient effort);1 person + 1 person to manage equipment;verbal cues required;nonverbal cues required (demo/gesture)  -CT moderate assist (50% patient effort);1 person + 1 person to manage equipment;verbal cues required;nonverbal cues required  (demo/gesture)  -CT     Gait, Assistive Device rolling walker  -CT rolling walker  -CT     Gait, Distance (Feet) 50  -CT 15   15 ft to and 15 ft from bathroom  -CT     Gait, Gait Pattern Analysis swing-to gait  -CT swing-to gait  -CT     Gait, Gait Deviations  marium decreased;forward flexed posture;step length decreased  -CT     Gait, Impairments strength decreased  -CT strength decreased  -CT     Recorded by [CT] Zara Israel, ARPAN [CT] Zara Israel PT     Motor Skills/Interventions    Additional Documentation  Balance Skills Training (Group)  -CT     Recorded by  [CT] Zara Israel PT     Balance Skills Training    Standing-Level of Assistance Minimum assistance  -CT Minimum assistance  -CT     Static Standing Balance Support assistive device  -CT assistive device  -CT     Standing-Balance Activities Weight Shift A-P;Weight Shift R-L  -CT Weight Shift A-P;Weight Shift R-L  -CT     Standing Balance # of Minutes  5   for hygeine   -CT     Stepping Strategy Assessment (Balance Skills) pt performed static standing w/ AD for hygeine  -CT      Recorded by [CT] Zara Israel PT [CT] Zara Israel PT     Therapy Exercises    Bilateral Lower Extremities AROM:;10 reps;sitting  -CT AROM:;10 reps;sitting  -CT     Recorded by [CT] Zara Israel PT [CT] Zara Israel PT     Positioning and Restraints    Pre-Treatment Position in bed  -CT in bed  -CT     Post Treatment Position chair  -CT chair  -CT     In Chair sitting;call light within reach;encouraged to call for assist;with family/caregiver;notified nsg  -CT sitting;call light within reach;encouraged to call for assist;with family/caregiver;notified nsg  -CT     Recorded by [CT] Zara Israel PT [CT] Zara Israel, PT       User Key  (r) = Recorded By, (t) = Taken By, (c) = Cosigned By    Initials Name Effective Dates    CT Zara Israel PT 03/14/16 -                 IP PT Goals       10/30/17 0133          Transfer Training PT LTG    Transfer Training PT  LTG, Date Established 10/30/17  -CT      Transfer Training PT LTG, Time to Achieve by discharge  -CT      Transfer Training PT LTG, Activity Type bed to chair /chair to bed;sit to stand/stand to sit  -CT      Transfer Training PT LTG, [place] Level contact guard assist;minimum assist (75% patient effort)  -CT      Transfer Training PT LTG, Assist Device other (see comments)   with appropriate AD  -CT      Gait Training PT LTG    Gait Training Goal PT LTG, Date Established 10/30/17  -CT      Gait Training Goal PT LTG, Time to Achieve by discharge  -CT      Gait Training Goal PT LTG, [place] Level minimum assist (75% patient effort);1 person + 1 person to manage equipment  -CT      Gait Training Goal PT LTG, Assist Device other (see comments)   with appropriate AD  -CT      Gait Training Goal PT LTG, Distance to Achieve 200  -CT        User Key  (r) = Recorded By, (t) = Taken By, (c) = Cosigned By    Initials Name Provider Type    CT Zara Israel PT Physical Therapist          Physical Therapy Education     Title: PT OT SLP Therapies (Active)     Topic: Physical Therapy (Active)     Point: Mobility training (Active)    Learning Progress Summary    Learner Readiness Method Response Comment Documented by Status   Patient Acceptance E NR  CT 11/01/17 1020 Active    Acceptance E NR,NL  KF 11/01/17 0037 Active    Acceptance E VU,NR  CT 10/31/17 1048 Done    Acceptance E NR  KF 10/31/17 0337 Active    Acceptance E NR  CT 10/30/17 1745 Active   Family Acceptance E VU  KF 11/01/17 0039 Done    Acceptance E VU  KF 10/31/17 0339 Done               Point: Home exercise program (Active)    Learning Progress Summary    Learner Readiness Method Response Comment Documented by Status   Patient Acceptance E NR  CT 11/01/17 1020 Active    Acceptance E NR,NL  KF 11/01/17 0037 Active    Acceptance E VU,NR  CT 10/31/17 1048 Done    Acceptance E NR  KF 10/31/17 0337 Active    Acceptance E NR  CT 10/30/17 1745 Active   Family  Acceptance E VU  KF 11/01/17 0039 Done    Acceptance E VU  KF 10/31/17 0339 Done               Point: Body mechanics (Active)    Learning Progress Summary    Learner Readiness Method Response Comment Documented by Status   Patient Acceptance E NR  CT 11/01/17 1020 Active    Acceptance E NR,NL  KF 11/01/17 0037 Active    Acceptance E VU,NR  CT 10/31/17 1048 Done    Acceptance E NR  KF 10/31/17 0337 Active    Acceptance E NR  CT 10/30/17 1745 Active   Family Acceptance E VU  KF 11/01/17 0039 Done    Acceptance E VU  KF 10/31/17 0339 Done               Point: Precautions (Active)    Learning Progress Summary    Learner Readiness Method Response Comment Documented by Status   Patient Acceptance E NR  CT 11/01/17 1020 Active    Acceptance E NR,NL  KF 11/01/17 0037 Active    Acceptance E VU,NR  CT 10/31/17 1048 Done    Acceptance E NR  KF 10/31/17 0337 Active    Acceptance E NR  CT 10/30/17 1745 Active   Family Acceptance E VU  KF 11/01/17 0039 Done    Acceptance E VU  KF 10/31/17 0339 Done                      User Key     Initials Effective Dates Name Provider Type Discipline    CT 03/14/16 -  Zara Israel, PT Physical Therapist PT     07/19/17 -  Mercedes Ta RN Registered Nurse Nurse                    PT Recommendation and Plan  Anticipated Equipment Needs At Discharge: front wheeled walker  Anticipated Discharge Disposition: home with assist, home with home health  Planned Therapy Interventions: balance training, bed mobility training, gait training, home exercise program, neuromuscular re-education, patient/family education, strengthening, transfer training, postural re-education  PT Frequency: 3-5 times/wk, per priority policy             Outcome Measures       11/01/17 1000 10/31/17 1000 10/30/17 1700    How much help from another person do you currently need...    Turning from your back to your side while in flat bed without using bedrails? 3  -CT 3  -CT 3  -CT    Moving from lying on back to sitting on  the side of a flat bed without bedrails? 3  -CT 3  -CT 3  -CT    Moving to and from a bed to a chair (including a wheelchair)? 3  -CT 3  -CT 3  -CT    Standing up from a chair using your arms (e.g., wheelchair, bedside chair)? 3  -CT 2  -CT 2  -CT    Climbing 3-5 steps with a railing? 2  -CT 2  -CT 2  -CT    To walk in hospital room? 2  -CT 2  -CT 2  -CT    AM-PAC 6 Clicks Score 16  -CT 15  -CT 15  -CT    Functional Assessment    Outcome Measure Options AM-PAC 6 Clicks Basic Mobility (PT)  -CT AM-PAC 6 Clicks Basic Mobility (PT)  -CT AM-PAC 6 Clicks Basic Mobility (PT)  -CT      User Key  (r) = Recorded By, (t) = Taken By, (c) = Cosigned By    Initials Name Provider Type    CT Zara Israel PT Physical Therapist           Time Calculation:         PT Charges       11/01/17 1021          Time Calculation    PT Received On 11/01/17  -CT      PT - Next Appointment 11/02/17  -CT      PT Goal Re-Cert Due Date 11/13/17  -CT      Time Calculation- PT    Total Timed Code Minutes- PT 38 minute(s)  -CT        User Key  (r) = Recorded By, (t) = Taken By, (c) = Cosigned By    Initials Name Provider Type    CT Zara Israel PT Physical Therapist          Therapy Charges for Today     Code Description Service Date Service Provider Modifiers Qty    63577569978 HC GAIT TRAINING EA 15 MIN 10/31/2017 Zara Israel, PT GP 1    19456144819 HC PT THERAPEUTIC ACT EA 15 MIN 10/31/2017 Zara Israel, PT GP 1    99920129621 HC PT THER SUPP EA 15 MIN 10/31/2017 Zara Israel, PT GP 2    29872909228 HC GAIT TRAINING EA 15 MIN 11/1/2017 Zara Israel, PT GP 1    35118508079 HC PT THERAPEUTIC ACT EA 15 MIN 11/1/2017 Zara Israel, PT GP 1    08715931453 HC PT THER PROC EA 15 MIN 11/1/2017 Zara Israel, PT GP 1    37526976161 HC PT THER SUPP EA 15 MIN 11/1/2017 Zara Israel PT GP 3          PT G-Codes  Outcome Measure Options: AM-PAC 6 Clicks Basic Mobility (PT)  Score: 15  Functional Limitation: Mobility: Walking and moving  around  Mobility: Walking and Moving Around Current Status (): At least 40 percent but less than 60 percent impaired, limited or restricted  Mobility: Walking and Moving Around Goal Status (): At least 20 percent but less than 40 percent impaired, limited or restricted    Zara Israel, PT  11/1/2017            Electronically signed by Zara Israel PT at 11/1/2017 10:23 AM        Occupational Therapy Notes (last 24 hours) (Notes from 10/31/2017 11:00 AM through 11/1/2017 11:00 AM)     No notes of this type exist for this encounter.

## 2017-11-01 NOTE — PROGRESS NOTES
LOS: 6 days     Chief Complaint:  Pulmonology is following for pulmonary nodule    Subjective     Interval History: patient is resting in bed.  No family present at this time.    History taken from: chart RN    Review of Systems:     Review of Systems - History obtained from unobtainable from patient due to mental status                    Objective     Vital Signs  Temp:  [97.7 °F (36.5 °C)-98.3 °F (36.8 °C)] 98.3 °F (36.8 °C)  Heart Rate:  [63-90] 90  Resp:  [16-29] 23  BP: (110-164)/(50-86) 113/50  Body mass index is 35.77 kg/(m^2).    Intake/Output Summary (Last 24 hours) at 11/01/17 0936  Last data filed at 11/01/17 0300   Gross per 24 hour   Intake              350 ml   Output              935 ml   Net             -585 ml          Physical Exam:  GENERAL APPEARANCE: Well developed, well nourished, alert and cooperative, and appears to be in no acute distress.    HEAD: normocephalic.    EYES: PERRL    NECK: Neck supple.     CARDIAC: Normal S1 and S2. No S3, S4 or murmurs. Rhythm is regular. There is no peripheral edema, cyanosis or pallor. Extremities are warm and well perfused. Capillary refill is less than 2 seconds. No carotid bruits.    RESPIRATORY: Clear to auscultation and percussion without rales, rhonchi, wheezing or diminished breath sounds.    GI: Positive bowel sounds. Soft, nondistended, nontender.     MUSCULOSKELTAL: No significant deformity or joint abnormality. No edema. Peripheral pulses intact.     NEUROLOGICAL: Unable to assess due to confusion.    PSYCHIATRIC: The mental examination revealed the patient was oriented to person.                 Results Review:                I reviewed the patient's new clinical results.  I reviewed the patient's new imaging results and agree with the interpretation.    Results from last 7 days  Lab Units 10/31/17  0331 10/30/17  0054 10/28/17  0029   WBC 10*3/mm3 7.99 8.59 7.41   HEMOGLOBIN g/dL 11.8* 11.1* 10.9*   PLATELETS 10*3/mm3 205 159 152       Results  from last 7 days  Lab Units 10/31/17  0331 10/30/17  0054 10/28/17  0029  10/27/17  0330   SODIUM mmol/L 141 138 138  < >  --    POTASSIUM mmol/L 3.6 3.9 4.1  < >  --    CHLORIDE mmol/L 104 106 108  < >  --    CO2 mmol/L 31.3 26.0 26.7  < >  --    BUN mg/dL 11 11 13  < >  --    CREATININE mg/dL 0.78 0.72 0.69  < >  --    CALCIUM mg/dL 8.8 8.5 7.9  < >  --    GLUCOSE mg/dL 140* 197* 180*  < >  --    MAGNESIUM mg/dL 1.6*  --  1.9  --  2.2   < > = values in this interval not displayed.  Lab Results   Component Value Date    INR 1.14 (H) 10/26/2017    PROTIME 14.8 10/26/2017       Results from last 7 days  Lab Units 10/31/17  0331 10/26/17  0250   ALK PHOS U/L 64 70   BILIRUBIN mg/dL 0.4 0.7   ALT (SGPT) U/L 19 18   AST (SGOT) U/L 21 26       Results from last 7 days  Lab Units 10/26/17  0236   PH, ARTERIAL pH units 7.390   PO2 ART mm Hg 46.5*   PCO2, ARTERIAL mm Hg 42.7   HCO3 ART mmol/L 25.3     Imaging Results (last 24 hours)     Procedure Component Value Units Date/Time    XR Chest AP [[id number]] Collected:  11/01/17 0713     Updated:  11/01/17 0844    Narrative:       XR CHEST AP-     CLINICAL INDICATION: eval chf; I21.4-Non-ST elevation (NSTEMI)  myocardial infarction; N30.00-Acute cystitis without hematuria.          COMPARISON: 10/30/2017.      TECHNIQUE: Single frontal view of the chest.     FINDINGS:     Left effusion and left basilar consolidation.  CHF.  There is no evidence of an acute osseous abnormality.   There are no suspicious-appearing parenchymal soft tissue nodules.            Impression:       Left effusion and left-sided consolidation and CHF         This report was finalized on 11/1/2017 8:42 AM by Dr. Daryl Rosas MD.                Medication Review:   Scheduled Medications:    amiodarone 200 mg Oral TID   atorvastatin 80 mg Oral Nightly   carvedilol 6.25 mg Oral BID With Meals   ceftriaxone 1 g Intravenous Q24H   cetirizine 5 mg Oral Daily   cholecalciferol 50,000 Units Oral Weekly    clopidogrel 75 mg Oral Daily   ferrous sulfate 325 mg Oral Daily With Breakfast   furosemide 40 mg Intravenous Q12H   hydrocortisone  Rectal BID   insulin aspart 0-7 Units Subcutaneous 4x Daily AC & at Bedtime   insulin aspart 0-7 Units Subcutaneous Once   insulin aspart 5 Units Subcutaneous TID With Meals   insulin detemir 25 Units Subcutaneous Nightly   ipratropium 0.5 mg Nebulization 4x Daily - RT   methylPREDNISolone sodium succinate 40 mg Intravenous Q12H   metroNIDAZOLE 500 mg Intravenous Q8H   pantoprazole 40 mg Oral QAM   Risaquad-2 1 capsule Oral Daily   rivaroxaban 15 mg Oral Daily With Dinner   rivastigmine 1 patch Transdermal Daily   sennosides-docusate sodium 2 tablet Oral Nightly   valsartan 80 mg Oral Q24H     Continuous infusions:       Assessment/Plan       Respiratory failure/hypoxia:  Patient's wheezing has improved this morning.    She is coughing more but still unable to cough up secretions.    CXR reviewed- congestion is better, but consolidation appears to have worsened.    Continue IV antibiotics.    Continue lasix BID.  Ordered CBC and BMP  Ordered extra dose of lasix 80 mg IV and potassium 40 mEq BID.    Will start patient on mucomyst neb treatments and order a sputum culture.    Continue scheduled inhalants and PRN neb treatments.    Pulmonary nodules:  3 nodules measuring 7 mm, 6 mm, and 8 mm.    Will follow patient on the outpatient basis to monitor nodules.    Hypomagnesemia:  Ordered mag level.    Patient Active Problem List   Diagnosis Code   • NSTEMI (non-ST elevated myocardial infarction) I21.4   • Essential hypertension I10   • Type II diabetes mellitus E11.9   • Dementia F03.90   • Sepsis secondary to UTI A41.9, N39.0   • Pulmonary nodules, bilateral R91.8   • RBBB I45.10   • Chronic right basal ganglia lacunar infarct on previous head CT I63.9   • Advanced age R54   • Hiatal hernia K44.9   • GERD (gastroesophageal reflux disease) K21.9   • Acute respiratory failure with hypoxia  J96.01   • Sepsis due to bilateral pneumonia J18.9, A41.9             LILY Arce  11/01/17  9:36 AM        Attestation: Scribed for Dr. Nash, by LILY Marrero      I, Wesley Nash M.D. attest that the above note accurately reflects the work and decisions made  by me.  Patient was seen and evaluated by Dr. Nash, including history of present illness, physical exam, assessment, and treatment plan.  The above note was reviewed and edited by Dr. Nash.

## 2017-11-01 NOTE — PLAN OF CARE
Problem: Patient Care Overview (Adult)  Goal: Plan of Care Review  Outcome: Ongoing (interventions implemented as appropriate)    Problem: Pressure Ulcer Risk (Alfred Scale) (Adult,Obstetrics,Pediatric)  Goal: Identify Related Risk Factors and Signs and Symptoms  Outcome: Ongoing (interventions implemented as appropriate)    Problem: Cardiac Output, Decreased (Adult)  Goal: Identify Related Risk Factors and Signs and Symptoms  Outcome: Ongoing (interventions implemented as appropriate)

## 2017-11-01 NOTE — PLAN OF CARE
Problem: Patient Care Overview (Adult)  Goal: Plan of Care Review  Outcome: Ongoing (interventions implemented as appropriate)  Goal: Adult Individualization and Mutuality  Outcome: Ongoing (interventions implemented as appropriate)  Goal: Discharge Needs Assessment  Outcome: Ongoing (interventions implemented as appropriate)    Problem: Pressure Ulcer Risk (Alfred Scale) (Adult,Obstetrics,Pediatric)  Goal: Identify Related Risk Factors and Signs and Symptoms  Outcome: Ongoing (interventions implemented as appropriate)  Goal: Skin Integrity  Outcome: Ongoing (interventions implemented as appropriate)    Problem: Cardiac Output, Decreased (Adult)  Goal: Identify Related Risk Factors and Signs and Symptoms  Outcome: Ongoing (interventions implemented as appropriate)  Goal: Adequate Cardiac Output/Effective Tissue Perfusion  Outcome: Ongoing (interventions implemented as appropriate)

## 2017-11-01 NOTE — THERAPY TREATMENT NOTE
Acute Care - Physical Therapy Treatment Note   [facility]     Patient Name: Floridalma Bryant  : 1934  MRN: [id number]  Today's Date: 2017  Onset of Illness/Injury or Date of Surgery Date: 10/26/17  Date of Referral to PT: 10/30/17  Referring Physician: Debi    Admit Date: 10/26/2017    Visit Dx:    ICD-10-CM ICD-9-CM   1. NSTEMI (non-ST elevated myocardial infarction) I21.4 410.70   2. Acute cystitis without hematuria N30.00 595.0     Patient Active Problem List   Diagnosis   • NSTEMI (non-ST elevated myocardial infarction)   • Essential hypertension   • Type II diabetes mellitus   • Dementia   • Sepsis secondary to UTI   • Pulmonary nodules, bilateral   • RBBB   • Chronic right basal ganglia lacunar infarct on previous head CT   • Advanced age   • Hiatal hernia   • GERD (gastroesophageal reflux disease)   • Acute respiratory failure with hypoxia   • Sepsis due to bilateral pneumonia               Adult Rehabilitation Note       17 1016 10/31/17 1044       Rehab Assessment/Intervention    Discipline physical therapist  -CT physical therapist  -CT     Document Type therapy note (daily note)  -CT therapy note (daily note)  -CT     Subjective Information agree to therapy;complains of;weakness  -CT agree to therapy;complains of;weakness  -CT     Patient Effort, Rehab Treatment adequate  -CT adequate  -CT     Symptoms Noted During/After Treatment fatigue  -CT fatigue  -CT     Precautions/Limitations fall precautions;oxygen therapy device and L/min   foly cath  -CT fall precautions;oxygen therapy device and L/min  -CT     Patient Response to Treatment Pt tolerated treatment session well with rest breaks provided as needed.   -CT Pt tolerated treatment session fairly well with rest breaks provided as needed. Pt only able to ambulate to and from the bathroom today with increased fatigue.   -CT     Recorded by [CT] Zara Israel, PT [CT] Zara Israel, PT     Cognitive Assessment/Intervention    Current  Cognitive/Communication Assessment functional  -CT functional  -CT     Orientation Status oriented to;person  -CT oriented to;person  -CT     Follows Commands/Answers Questions able to follow single-step instructions;100% of the time  -CT      Personal Safety decreased awareness, need for assist;decreased awareness, need for safety  -CT decreased awareness, need for assist;decreased awareness, need for safety  -CT     Personal Safety Interventions fall prevention program maintained;gait belt;muscle strengthening facilitated;nonskid shoes/slippers when out of bed  -CT fall prevention program maintained;gait belt;muscle strengthening facilitated;nonskid shoes/slippers when out of bed  -CT     Recorded by [CT] Zara Israel, PT [CT] Zara Israel, PT     Bed Mobility, Assessment/Treatment    Bed Mobility, Scoot/Bridge, [place] moderate assist (50% patient effort)  -CT moderate assist (50% patient effort)  -CT     Bed Mob, Supine to Sit, [place] moderate assist (50% patient effort)  -CT moderate assist (50% patient effort)  -CT     Bed Mobility, Safety Issues decreased use of arms for pushing/pulling;decreased use of legs for bridging/pushing  -CT decreased use of arms for pushing/pulling;decreased use of legs for bridging/pushing  -CT     Recorded by [CT] Zara Israel, PT [CT] Zara Israel, PT     Transfer Assessment/Treatment    Transfers, Sit-Stand [place] minimum assist (75% patient effort)  -CT minimum assist (75% patient effort)  -CT     Transfers, Stand-Sit [place] minimum assist (75% patient effort)  -CT minimum assist (75% patient effort)  -CT     Transfers, Sit-Stand-Sit, Assist Device rolling walker  -CT rolling walker  -CT     Transfer, Impairments strength decreased  -CT strength decreased  -CT     Recorded by [CT] Zara Israel, PT [CT] Zara Israel PT     Gait Assessment/Treatment    Gait, [place] Level moderate assist (50% patient effort);1 person + 1 person to manage  equipment;verbal cues required;nonverbal cues required (demo/gesture)  -CT moderate assist (50% patient effort);1 person + 1 person to manage equipment;verbal cues required;nonverbal cues required (demo/gesture)  -CT     Gait, Assistive Device rolling walker  -CT rolling walker  -CT     Gait, Distance (Feet) 50  -CT 15   15 ft to and 15 ft from bathroom  -CT     Gait, Gait Pattern Analysis swing-to gait  -CT swing-to gait  -CT     Gait, Gait Deviations  marium decreased;forward flexed posture;step length decreased  -CT     Gait, Impairments strength decreased  -CT strength decreased  -CT     Recorded by [CT] Zara Israel PT [CT] Zara Israel PT     Motor Skills/Interventions    Additional Documentation  Balance Skills Training (Group)  -CT     Recorded by  [CT] Zara Israel PT     Balance Skills Training    Standing-Level of Assistance Minimum assistance  -CT Minimum assistance  -CT     Static Standing Balance Support assistive device  -CT assistive device  -CT     Standing-Balance Activities Weight Shift A-P;Weight Shift R-L  -CT Weight Shift A-P;Weight Shift R-L  -CT     Standing Balance # of Minutes  5   for hygeine   -CT     Stepping Strategy Assessment (Balance Skills) pt performed static standing w/ AD for hygeine  -CT      Recorded by [CT] Zara Israel PT [CT] Zara Israel PT     Therapy Exercises    Bilateral Lower Extremities AROM:;10 reps;sitting  -CT AROM:;10 reps;sitting  -CT     Recorded by [CT] Zara Israel PT [CT] Zara Israel PT     Positioning and Restraints    Pre-Treatment Position in bed  -CT in bed  -CT     Post Treatment Position chair  -CT chair  -CT     In Chair sitting;call light within reach;encouraged to call for assist;with family/caregiver;notified nsg  -CT sitting;call light within reach;encouraged to call for assist;with family/caregiver;notified nsg  -CT     Recorded by [CT] Zara Israel PT [CT] Zara Israel PT       User Key  (r) = Recorded By, (t) = Taken  By, (c) = Cosigned By    Initials Name Effective Dates    CT Zara Israel, PT 03/14/16 -                 IP PT Goals       10/30/17 1744          Transfer Training PT LTG    Transfer Training PT LTG, Date Established 10/30/17  -CT      Transfer Training PT LTG, Time to Achieve by discharge  -CT      Transfer Training PT LTG, Activity Type bed to chair /chair to bed;sit to stand/stand to sit  -CT      Transfer Training PT LTG, [place] Level contact guard assist;minimum assist (75% patient effort)  -CT      Transfer Training PT LTG, Assist Device other (see comments)   with appropriate AD  -CT      Gait Training PT LTG    Gait Training Goal PT LTG, Date Established 10/30/17  -CT      Gait Training Goal PT LTG, Time to Achieve by discharge  -CT      Gait Training Goal PT LTG, [place] Level minimum assist (75% patient effort);1 person + 1 person to manage equipment  -CT      Gait Training Goal PT LTG, Assist Device other (see comments)   with appropriate AD  -CT      Gait Training Goal PT LTG, Distance to Achieve 200  -CT        User Key  (r) = Recorded By, (t) = Taken By, (c) = Cosigned By    Initials Name Provider Type    CT Zara Israel, PT Physical Therapist          Physical Therapy Education     Title: PT OT SLP Therapies (Active)     Topic: Physical Therapy (Active)     Point: Mobility training (Active)    Learning Progress Summary    Learner Readiness Method Response Comment Documented by Status   Patient Acceptance E NR  CT 11/01/17 1020 Active    Acceptance E NR,NL  KF 11/01/17 0037 Active    Acceptance E VU,NR  CT 10/31/17 1048 Done    Acceptance E NR  KF 10/31/17 0337 Active    Acceptance E NR  CT 10/30/17 1745 Active   Family Acceptance E VU  KF 11/01/17 0039 Done    Acceptance E VU  KF 10/31/17 0339 Done               Point: Home exercise program (Active)    Learning Progress Summary    Learner Readiness Method Response Comment Documented by Status   Patient Acceptance E NR  CT 11/01/17 1020  Active    Acceptance E NR,NL  KF 11/01/17 0037 Active    Acceptance E VU,NR  CT 10/31/17 1048 Done    Acceptance E NR  KF 10/31/17 0337 Active    Acceptance E NR  CT 10/30/17 1745 Active   Family Acceptance E VU  KF 11/01/17 0039 Done    Acceptance E VU  KF 10/31/17 0339 Done               Point: Body mechanics (Active)    Learning Progress Summary    Learner Readiness Method Response Comment Documented by Status   Patient Acceptance E NR  CT 11/01/17 1020 Active    Acceptance E NR,NL  KF 11/01/17 0037 Active    Acceptance E VU,NR  CT 10/31/17 1048 Done    Acceptance E NR  KF 10/31/17 0337 Active    Acceptance E NR  CT 10/30/17 1745 Active   Family Acceptance E VU  KF 11/01/17 0039 Done    Acceptance E VU  KF 10/31/17 0339 Done               Point: Precautions (Active)    Learning Progress Summary    Learner Readiness Method Response Comment Documented by Status   Patient Acceptance E NR  CT 11/01/17 1020 Active    Acceptance E NR,NL  KF 11/01/17 0037 Active    Acceptance E VU,NR  CT 10/31/17 1048 Done    Acceptance E NR  KF 10/31/17 0337 Active    Acceptance E NR  CT 10/30/17 1745 Active   Family Acceptance E VU  KF 11/01/17 0039 Done    Acceptance E VU  KF 10/31/17 0339 Done                      User Key     Initials Effective Dates Name Provider Type Discipline    CT 03/14/16 -  Zara Israel, PT Physical Therapist PT     07/19/17 -  Mercedes Ta RN Registered Nurse Nurse                    PT Recommendation and Plan  Anticipated Equipment Needs At Discharge: front wheeled walker  Anticipated Discharge Disposition: home with assist, home with home health  Planned Therapy Interventions: balance training, bed mobility training, gait training, home exercise program, neuromuscular re-education, patient/family education, strengthening, transfer training, postural re-education  PT Frequency: 3-5 times/wk, per priority policy             Outcome Measures       11/01/17 1000 10/31/17 1000 10/30/17 1700    How much  help from another person do you currently need...    Turning from your back to your side while in flat bed without using bedrails? 3  -CT 3  -CT 3  -CT    Moving from lying on back to sitting on the side of a flat bed without bedrails? 3  -CT 3  -CT 3  -CT    Moving to and from a bed to a chair (including a wheelchair)? 3  -CT 3  -CT 3  -CT    Standing up from a chair using your arms (e.g., wheelchair, bedside chair)? 3  -CT 2  -CT 2  -CT    Climbing 3-5 steps with a railing? 2  -CT 2  -CT 2  -CT    To walk in hospital room? 2  -CT 2  -CT 2  -CT    AM-PAC 6 Clicks Score 16  -CT 15  -CT 15  -CT    Functional Assessment    Outcome Measure Options AM-PAC 6 Clicks Basic Mobility (PT)  -CT AM-PAC 6 Clicks Basic Mobility (PT)  -CT AM-PAC 6 Clicks Basic Mobility (PT)  -CT      User Key  (r) = Recorded By, (t) = Taken By, (c) = Cosigned By    Initials Name Provider Type    CT Zara Israel PT Physical Therapist           Time Calculation:         PT Charges       11/01/17 1021          Time Calculation    PT Received On 11/01/17  -CT      PT - Next Appointment 11/02/17  -CT      PT Goal Re-Cert Due Date 11/13/17  -CT      Time Calculation- PT    Total Timed Code Minutes- PT 38 minute(s)  -CT        User Key  (r) = Recorded By, (t) = Taken By, (c) = Cosigned By    Initials Name Provider Type    CT Zara Israel PT Physical Therapist          Therapy Charges for Today     Code Description Service Date Service Provider Modifiers Qty    88824806982 HC GAIT TRAINING EA 15 MIN 10/31/2017 Zara Israel, PT GP 1    39252175521 HC PT THERAPEUTIC ACT EA 15 MIN 10/31/2017 Zara Israel, PT GP 1    38271039735 HC PT THER SUPP EA 15 MIN 10/31/2017 Zara Israel PT GP 2    11695670699 HC GAIT TRAINING EA 15 MIN 11/1/2017 Zara Israel, PT GP 1    67721699308 HC PT THERAPEUTIC ACT EA 15 MIN 11/1/2017 Zara Israel, PT GP 1    43944241549 HC PT THER PROC EA 15 MIN 11/1/2017 Zara Israel, PT GP 1    [id number]  PT THER SUPP  EA 15 MIN 11/1/2017 Zara Israel, PT GP 3          PT G-Codes  Outcome Measure Options: AM-PAC 6 Clicks Basic Mobility (PT)  Score: 15  Functional Limitation: Mobility: Walking and moving around  Mobility: Walking and Moving Around Current Status (): At least 40 percent but less than 60 percent impaired, limited or restricted  Mobility: Walking and Moving Around Goal Status (): At least 20 percent but less than 40 percent impaired, limited or restricted    Zara Israel, PT  11/1/2017

## 2017-11-01 NOTE — MBS/VFSS/FEES
Acute Care - Speech Language Pathology   Swallow Re-Evaluation  Pedro   MODIFIED BARIUM SWALLOW STUDY     Patient Name: Floridalma Bryant  : 1934  MRN: [id number]  Today's Date: 2017  Onset of Illness/Injury or Date of Surgery Date: 10/26/17          Admit Date: 10/26/2017    Floridalma Bryant  presents to the radiology suite this am from P203/S2 to participate in an instrumental MBS to assess safety/efficacy of swallowing fnx, determine safest/least restrictive diet.         Social History     Social History   • Marital status:      Spouse name: N/A   • Number of children: N/A   • Years of education: N/A     Occupational History   • Not on file.     Social History Main Topics   • Smoking status: Never Smoker   • Smokeless tobacco: Never Used   • Alcohol use No   • Drug use: No   • Sexual activity: Defer     Other Topics Concern   • Not on file     Social History Narrative      Diet Orders (active)     Start     Ordered    17 1637  Diet Soft Texture; Ground; Thin; Cardiac, Consistent Carbohydrate  Diet Effective Now      17 1636      Risks and benefits of the procedure are explained w/ verbalizing understanding/agreement to participate. Proceed per protocol. She is accompanied by RN, Veronica, to this procedure.     Ms. Braynt is positioned upright and centered in a wheelchair to accept multiple po presentations of solid cracker, puree, honey thick, nectar thick, and thin liquids via spoon, cup and straw, along w/ whole placebo pill in puree. She is able to self feed w/ standby assistance.     All views are from the lateral plane.     Facial/oral structures are symmetrical upon observation w/o lingual deviation upon protrusion. Oral mucosa are moist, pink and clean. Secretions are clear, thin and well controlled. OROM/KALEY is generally weak w/ , but adequate to imitate oral postures. Gag is not assessed. Volitional cough is adequate in intensity, clear in quality, nonproductive. Vocal quality  is adequate in intensity, clear in quality w/ intelligible speech. She is a/a and cooperative to participate, oriented to self, follows simple directives, and participates in simple conversational exchanges.     Upon po presentations, adequate bolus anticipation w/ good labial seal for bolus clearance via spoon bowl, cup rim stability and suction via straw.  Bolus formation, manipulation,  and control are generally weak w/ increased oral prep time w/solids w/ mixed rotary/phasic mastication pattern. A-p transit is delayed w/ solids only w/ piecemeal deglutition w/o oral residue. Tongue base retraction and linguavelar seal are adequate w/o significant premature spillage. No laryngeal penetration or aspiration is evidenced before the swallow.     Pharyngeal swallow is timely w/ adequate hyolaryngeal elevation and epiglottic inversion. Pharyngeal contraction is adequate w/o significant residue. Transient laryngeal penetration of thin liquids only during the swallow only, clearing upon completion of the swallow w/o residue. No other laryngeal penetration or aspiration evidenced during or after the swallow.     Full esophageal sweep reveals poor esophageal motility. No significant retrograde flow or FELY evidenced.      Impression: Ms. Bryant w/ mild-moderate oral dysphagia 2/2 generalized weakness, wfl pharyngeal swallow. She will benefit from modified po diet of mechanical soft diet, ground meats, thin liquids, medications crushed in puree, standby assist w/ meals. SLP to f/u for diet tolerance.      1. Mechanical soft diet, ground meats, thin liquids.   2. Meds crushed in puree/thins.   3. Universal aspiration precautions.   4. FELY precautions.   5. Oral care protocol.   SLP to f/u for diet tolerance.     D/w pt results and recommendations w/ verbal understanding and agreement.     D/w RN, Veronica and Juana,  results and recommendations w/ verbal understanding and agreement. Report provided via telephone to Dr. Carrera w/  order to initiate recommended diet changes.     Thank you for allowing me to participate in the care of your patient-  Marcelina Rivera M.S., CCC/SLP    Visit Dx:     ICD-10-CM ICD-9-CM   1. NSTEMI (non-ST elevated myocardial infarction) I21.4 410.70   2. Acute cystitis without hematuria N30.00 595.0     Patient Active Problem List   Diagnosis   • NSTEMI (non-ST elevated myocardial infarction)   • Essential hypertension   • Type II diabetes mellitus   • Dementia   • Sepsis secondary to UTI   • Pulmonary nodules, bilateral   • RBBB   • Chronic right basal ganglia lacunar infarct on previous head CT   • Advanced age   • Hiatal hernia   • GERD (gastroesophageal reflux disease)   • Acute respiratory failure with hypoxia   • Sepsis due to bilateral pneumonia     Past Medical History:   Diagnosis Date   • Dementia    • Dementia    • Diabetes mellitus    • Hypertension    • Pancreatitis      Past Surgical History:   Procedure Laterality Date   • ANKLE SURGERY     • HYSTERECTOMY     • SHOULDER SURGERY     • TYMPANOPLASTY             EDUCATION  The patient has been educated in the following areas:   Dysphagia (Swallowing Impairment) Oral Care/Hydration Modified Diet Instruction.    SLP Recommendation and Plan                                                         Time Calculation:         Time Calculation- SLP       11/01/17 1352          Time Calculation- SLP    SLP - Next Appointment 11/01/17  -MACK        User Key  (r) = Recorded By, (t) = Taken By, (c) = Cosigned By    Initials Name Provider Type    MACK Rivera MS CCC-SLP Speech Therapist          Therapy Charges for Today     Code Description Service Date Service Provider Modifiers Qty    61453653771 HC ST SWALLOWING CURRENT STATUS 10/31/2017 Marcelina Rivera MS CCC-SLP GN, CI 1    [id number] HC ST SWALLOWING PROJECTED 10/31/2017 Marcelina Rivera MS CCC-SLP GN, CI 1    [id number] HC ST SWALLOWING DISCHARGE 10/31/2017 Marcelina Rivrea MS CCC-SLP GN, CI 1     [id number] HC ST TREATMENT SWALLOW 3 10/31/2017 Marcelina Rivera, MS CCC-SLP GN 1    [id number] HC ST SWALLOWING CURRENT STATUS 11/1/2017 Marcelina Rivera MS CCC-SLP GN, CI 1    [id number] HC ST SWALLOWING PROJECTED 11/1/2017 Marcelina Rivera MS CCC-SLP GN, CI 1    [id number] HC ST SWALLOWING DISCHARGE 11/1/2017 Marcelina Rivera MS CCC-SLP GN, CI 1    [id number] HC ST MOTION FLUORO EVAL SWALLOW 7 11/1/2017 Marcelina Rivera MS CCC-SLP GN 1          SLP G-Codes  SLP NOMS Used?: Yes  Functional Limitations: Swallowing  Swallow Current Status (): At least 1 percent but less than 20 percent impaired, limited or restricted  Swallow Goal Status (): At least 1 percent but less than 20 percent impaired, limited or restricted  Swallow Discharge Status (): At least 1 percent but less than 20 percent impaired, limited or restricted    Marcelina Rivera MS CCC-SLP  11/1/2017

## 2017-11-01 NOTE — SIGNIFICANT NOTE
MBS attempted at 1330 w/ transporters arriving to PCU to transfer pt to radiology suite, however, RN unable to leave floor to accompany to radiology at this time.     Plan: MERI this pm when RN is available to accompany.

## 2017-11-01 NOTE — PROGRESS NOTES
Discharge Planning Assessment  DEQUAN Vasquez     Patient Name: Floridalma Bryant  MRN: [id number]  Today's Date: 11/1/2017    Admit Date: 10/26/2017       Discharge Plan       11/01/17 1106    Case Management/Social Work Plan    Plan SS spoke with pt's daughter on this date and she is requesting short-term rehab at [facility] and [facility].  SS faxed information to [facility] and [facility] and spoke with Ruiz and she is agreeable to evlauate for possible placement.  SS will continue to follow and will assist as needed.      Patient/Family In Agreement With Plan yes   Radha Zendejas

## 2017-11-02 LAB
ANION GAP SERPL CALCULATED.3IONS-SCNC: 10.7 MMOL/L (ref 3.6–11.2)
BACTERIA SPEC AEROBE CULT: NORMAL
BACTERIA SPEC AEROBE CULT: NORMAL
BASOPHILS # BLD AUTO: 0.03 10*3/MM3 (ref 0–0.3)
BASOPHILS NFR BLD AUTO: 0.2 % (ref 0–2)
BNP SERPL-MCNC: 284 PG/ML (ref 0–100)
BUN BLD-MCNC: 27 MG/DL (ref 7–21)
BUN/CREAT SERPL: 25.5 (ref 7–25)
CALCIUM SPEC-SCNC: 8.7 MG/DL (ref 7.7–10)
CHLORIDE SERPL-SCNC: 96 MMOL/L (ref 99–112)
CO2 SERPL-SCNC: 30.3 MMOL/L (ref 24.3–31.9)
CREAT BLD-MCNC: 1.06 MG/DL (ref 0.43–1.29)
CRP SERPL-MCNC: 1.72 MG/DL (ref 0–0.99)
DEPRECATED RDW RBC AUTO: 57.3 FL (ref 37–54)
EOSINOPHIL # BLD AUTO: 0 10*3/MM3 (ref 0–0.7)
EOSINOPHIL NFR BLD AUTO: 0 % (ref 0–7)
ERYTHROCYTE [DISTWIDTH] IN BLOOD BY AUTOMATED COUNT: 18.5 % (ref 11.5–14.5)
GFR SERPL CREATININE-BSD FRML MDRD: 50 ML/MIN/1.73
GLUCOSE BLD-MCNC: 321 MG/DL (ref 70–110)
GLUCOSE BLDC GLUCOMTR-MCNC: 144 MG/DL (ref 70–130)
GLUCOSE BLDC GLUCOMTR-MCNC: 256 MG/DL (ref 70–130)
GLUCOSE BLDC GLUCOMTR-MCNC: 273 MG/DL (ref 70–130)
GLUCOSE BLDC GLUCOMTR-MCNC: 375 MG/DL (ref 70–130)
HCT VFR BLD AUTO: 38.6 % (ref 37–47)
HGB BLD-MCNC: 12.3 G/DL (ref 12–16)
IMM GRANULOCYTES # BLD: 0.08 10*3/MM3 (ref 0–0.03)
IMM GRANULOCYTES NFR BLD: 0.5 % (ref 0–0.5)
LYMPHOCYTES # BLD AUTO: 1.24 10*3/MM3 (ref 1–3)
LYMPHOCYTES NFR BLD AUTO: 8.4 % (ref 16–46)
MAGNESIUM SERPL-MCNC: 2.1 MG/DL (ref 1.7–2.6)
MAGNESIUM SERPL-MCNC: 2.3 MG/DL (ref 1.7–2.6)
MCH RBC QN AUTO: 26.9 PG (ref 27–33)
MCHC RBC AUTO-ENTMCNC: 31.9 G/DL (ref 33–37)
MCV RBC AUTO: 84.5 FL (ref 80–94)
MONOCYTES # BLD AUTO: 0.79 10*3/MM3 (ref 0.1–0.9)
MONOCYTES NFR BLD AUTO: 5.3 % (ref 0–12)
NEUTROPHILS # BLD AUTO: 12.66 10*3/MM3 (ref 1.4–6.5)
NEUTROPHILS NFR BLD AUTO: 85.6 % (ref 40–75)
NRBC BLD MANUAL-RTO: 0 /100 WBC (ref 0–0)
OSMOLALITY SERPL CALC.SUM OF ELEC: 291.3 MOSM/KG (ref 273–305)
PLATELET # BLD AUTO: 323 10*3/MM3 (ref 130–400)
PMV BLD AUTO: 11 FL (ref 6–10)
POTASSIUM BLD-SCNC: 4 MMOL/L (ref 3.5–5.3)
RBC # BLD AUTO: 4.57 10*6/MM3 (ref 4.2–5.4)
SODIUM BLD-SCNC: 137 MMOL/L (ref 135–153)
WBC NRBC COR # BLD: 14.8 10*3/MM3 (ref 4.5–12.5)

## 2017-11-02 PROCEDURE — 86140 C-REACTIVE PROTEIN: CPT | Performed by: INTERNAL MEDICINE

## 2017-11-02 PROCEDURE — 97116 GAIT TRAINING THERAPY: CPT

## 2017-11-02 PROCEDURE — 99233 SBSQ HOSP IP/OBS HIGH 50: CPT | Performed by: INTERNAL MEDICINE

## 2017-11-02 PROCEDURE — 94799 UNLISTED PULMONARY SVC/PX: CPT

## 2017-11-02 PROCEDURE — 25010000002 METHYLPREDNISOLONE PER 40 MG: Performed by: NURSE PRACTITIONER

## 2017-11-02 PROCEDURE — 93005 ELECTROCARDIOGRAM TRACING: CPT | Performed by: INTERNAL MEDICINE

## 2017-11-02 PROCEDURE — 25010000002 FUROSEMIDE PER 20 MG: Performed by: INTERNAL MEDICINE

## 2017-11-02 PROCEDURE — 83735 ASSAY OF MAGNESIUM: CPT | Performed by: INTERNAL MEDICINE

## 2017-11-02 PROCEDURE — 97110 THERAPEUTIC EXERCISES: CPT

## 2017-11-02 PROCEDURE — 83880 ASSAY OF NATRIURETIC PEPTIDE: CPT | Performed by: INTERNAL MEDICINE

## 2017-11-02 PROCEDURE — 93010 ELECTROCARDIOGRAM REPORT: CPT | Performed by: INTERNAL MEDICINE

## 2017-11-02 PROCEDURE — 85025 COMPLETE CBC W/AUTO DIFF WBC: CPT | Performed by: INTERNAL MEDICINE

## 2017-11-02 PROCEDURE — 25010000002 CEFTRIAXONE PER 250 MG: Performed by: INTERNAL MEDICINE

## 2017-11-02 PROCEDURE — 63710000001 INSULIN DETEMIR PER 5 UNITS: Performed by: INTERNAL MEDICINE

## 2017-11-02 PROCEDURE — 82962 GLUCOSE BLOOD TEST: CPT

## 2017-11-02 PROCEDURE — 80048 BASIC METABOLIC PNL TOTAL CA: CPT | Performed by: INTERNAL MEDICINE

## 2017-11-02 PROCEDURE — 63710000001 INSULIN ASPART PER 5 UNITS: Performed by: INTERNAL MEDICINE

## 2017-11-02 PROCEDURE — 92526 ORAL FUNCTION THERAPY: CPT

## 2017-11-02 PROCEDURE — 99232 SBSQ HOSP IP/OBS MODERATE 35: CPT | Performed by: INTERNAL MEDICINE

## 2017-11-02 PROCEDURE — 63710000001 INSULIN ASPART PER 5 UNITS: Performed by: PHYSICIAN ASSISTANT

## 2017-11-02 RX ORDER — METHYLPREDNISOLONE SODIUM SUCCINATE 40 MG/ML
20 INJECTION, POWDER, LYOPHILIZED, FOR SOLUTION INTRAMUSCULAR; INTRAVENOUS EVERY 12 HOURS
Status: DISCONTINUED | OUTPATIENT
Start: 2017-11-02 | End: 2017-11-03

## 2017-11-02 RX ADMIN — RIVAROXABAN 15 MG: 15 TABLET, FILM COATED ORAL at 17:53

## 2017-11-02 RX ADMIN — INSULIN ASPART 6 UNITS: 100 INJECTION, SOLUTION INTRAVENOUS; SUBCUTANEOUS at 13:15

## 2017-11-02 RX ADMIN — ACETYLCYSTEINE 200 MG: 200 SOLUTION ORAL; RESPIRATORY (INHALATION) at 19:33

## 2017-11-02 RX ADMIN — INSULIN ASPART 4 UNITS: 100 INJECTION, SOLUTION INTRAVENOUS; SUBCUTANEOUS at 21:12

## 2017-11-02 RX ADMIN — IPRATROPIUM BROMIDE 0.5 MG: 0.5 SOLUTION RESPIRATORY (INHALATION) at 00:29

## 2017-11-02 RX ADMIN — METRONIDAZOLE 500 MG: 500 INJECTION, SOLUTION INTRAVENOUS at 05:29

## 2017-11-02 RX ADMIN — IPRATROPIUM BROMIDE 0.5 MG: 0.5 SOLUTION RESPIRATORY (INHALATION) at 12:50

## 2017-11-02 RX ADMIN — PANTOPRAZOLE SODIUM 40 MG: 40 TABLET, DELAYED RELEASE ORAL at 06:05

## 2017-11-02 RX ADMIN — FUROSEMIDE 40 MG: 10 INJECTION, SOLUTION INTRAMUSCULAR; INTRAVENOUS at 10:22

## 2017-11-02 RX ADMIN — METRONIDAZOLE 500 MG: 500 INJECTION, SOLUTION INTRAVENOUS at 13:14

## 2017-11-02 RX ADMIN — VALSARTAN 80 MG: 80 TABLET ORAL at 10:23

## 2017-11-02 RX ADMIN — DOCUSATE SODIUM AND SENNOSIDES 2 TABLET: 8.6; 5 TABLET, FILM COATED ORAL at 21:11

## 2017-11-02 RX ADMIN — CARVEDILOL 6.25 MG: 6.25 TABLET, FILM COATED ORAL at 17:53

## 2017-11-02 RX ADMIN — HYDROCORTISONE 2.5%: 25 CREAM TOPICAL at 17:54

## 2017-11-02 RX ADMIN — FERROUS SULFATE TAB 325 MG (65 MG ELEMENTAL FE) 325 MG: 325 (65 FE) TAB at 10:21

## 2017-11-02 RX ADMIN — Medication 1 CAPSULE: at 10:20

## 2017-11-02 RX ADMIN — INSULIN ASPART 5 UNITS: 100 INJECTION, SOLUTION INTRAVENOUS; SUBCUTANEOUS at 10:21

## 2017-11-02 RX ADMIN — ATORVASTATIN CALCIUM 80 MG: 40 TABLET, FILM COATED ORAL at 21:11

## 2017-11-02 RX ADMIN — AMIODARONE HYDROCHLORIDE 200 MG: 200 TABLET ORAL at 10:19

## 2017-11-02 RX ADMIN — IPRATROPIUM BROMIDE AND ALBUTEROL SULFATE 3 ML: .5; 3 SOLUTION RESPIRATORY (INHALATION) at 19:33

## 2017-11-02 RX ADMIN — METHYLPREDNISOLONE SODIUM SUCCINATE 20 MG: 40 INJECTION, POWDER, FOR SOLUTION INTRAMUSCULAR; INTRAVENOUS at 21:11

## 2017-11-02 RX ADMIN — HYDROCORTISONE 2.5%: 25 CREAM TOPICAL at 10:22

## 2017-11-02 RX ADMIN — FUROSEMIDE 40 MG: 10 INJECTION, SOLUTION INTRAMUSCULAR; INTRAVENOUS at 21:10

## 2017-11-02 RX ADMIN — CLOPIDOGREL 75 MG: 75 TABLET, FILM COATED ORAL at 10:20

## 2017-11-02 RX ADMIN — RIVASTIGMINE 1 PATCH: 9.5 PATCH, EXTENDED RELEASE TRANSDERMAL at 10:24

## 2017-11-02 RX ADMIN — CEFTRIAXONE 1 G: 1 INJECTION, SOLUTION INTRAVENOUS at 13:14

## 2017-11-02 RX ADMIN — ACETAMINOPHEN 650 MG: 325 TABLET ORAL at 18:17

## 2017-11-02 RX ADMIN — ACETYLCYSTEINE: 200 SOLUTION ORAL; RESPIRATORY (INHALATION) at 06:29

## 2017-11-02 RX ADMIN — GUAIFENESIN 200 MG: 100 SOLUTION ORAL at 10:25

## 2017-11-02 RX ADMIN — AMIODARONE HYDROCHLORIDE 200 MG: 200 TABLET ORAL at 21:11

## 2017-11-02 RX ADMIN — INSULIN ASPART 5 UNITS: 100 INJECTION, SOLUTION INTRAVENOUS; SUBCUTANEOUS at 13:15

## 2017-11-02 RX ADMIN — CETIRIZINE HYDROCHLORIDE 5 MG: 10 TABLET ORAL at 10:20

## 2017-11-02 RX ADMIN — METRONIDAZOLE 500 MG: 500 INJECTION, SOLUTION INTRAVENOUS at 21:12

## 2017-11-02 RX ADMIN — INSULIN DETEMIR 30 UNITS: 100 INJECTION, SOLUTION SUBCUTANEOUS at 21:12

## 2017-11-02 RX ADMIN — INSULIN ASPART 4 UNITS: 100 INJECTION, SOLUTION INTRAVENOUS; SUBCUTANEOUS at 10:21

## 2017-11-02 RX ADMIN — AMIODARONE HYDROCHLORIDE 200 MG: 200 TABLET ORAL at 17:53

## 2017-11-02 RX ADMIN — CARVEDILOL 6.25 MG: 6.25 TABLET, FILM COATED ORAL at 10:20

## 2017-11-02 RX ADMIN — IPRATROPIUM BROMIDE 0.5 MG: 0.5 SOLUTION RESPIRATORY (INHALATION) at 06:29

## 2017-11-02 NOTE — THERAPY TREATMENT NOTE
Acute Care - Speech Language Pathology   Swallow Diet Tolerance/Follow-Up  Pedro     Patient Name: Floridalma Bryant  : 1934  MRN: [id number]  Today's Date: 2017  Onset of Illness/Injury or Date of Surgery Date: 10/26/17          Admit Date: 10/26/2017  Ms. Bryant is seen at bedside this am on PCU. Her daughter is present. Pt is a/a and interactive. She is positioned upright and centered in a bedside chair to accept cracker and thin coffee via spoon, cup. She tolerates well w/o overt s/s aspiration evidenced. She is noted w/ dry cough before and after po intake. This is felt to be likely reflective of evidenced esophageal dysmotility w/ retrograde flow 2/2 poor esophageal peristalsis. D/w pt and daughter, at length, poor motility w/ cough. Educated on small bites, slow bolus rate, alternating liquids and solids. Pt's daughter agrees.     Visit Dx:      ICD-10-CM ICD-9-CM   1. NSTEMI (non-ST elevated myocardial infarction) I21.4 410.70   2. Acute cystitis without hematuria N30.00 595.0     Patient Active Problem List   Diagnosis   • NSTEMI (non-ST elevated myocardial infarction)   • Essential hypertension   • Type II diabetes mellitus   • Dementia   • Sepsis secondary to UTI   • Pulmonary nodules, bilateral   • RBBB   • Chronic right basal ganglia lacunar infarct on previous head CT   • Advanced age   • Hiatal hernia   • GERD (gastroesophageal reflux disease)   • Acute respiratory failure with hypoxia   • Sepsis due to bilateral pneumonia             Adult Rehabilitation Note       17 1007 17 1016 10/31/17 1044    Rehab Assessment/Intervention    Discipline physical therapy assistant  -RF physical therapist  -CT physical therapist  -CT    Document Type therapy note (daily note)  -RF therapy note (daily note)  -CT therapy note (daily note)  -CT    Subjective Information agree to therapy  -RF agree to therapy;complains of;weakness  -CT agree to therapy;complains of;weakness  -CT    Patient  Effort, Rehab Treatment adequate  -RF adequate  -CT adequate  -CT    Symptoms Noted During/After Treatment fatigue;shortness of breath  -RF fatigue  -CT fatigue  -CT    Precautions/Limitations fall precautions;oxygen therapy device and L/min   bernard cath  -RF fall precautions;oxygen therapy device and L/min   foly cath  -CT fall precautions;oxygen therapy device and L/min  -CT    Patient Response to Treatment Pt tolerant to treatment session with no increased fatigue or pain noted. Rest breaks provided as necessary.   -RF Pt tolerated treatment session well with rest breaks provided as needed.   -CT Pt tolerated treatment session fairly well with rest breaks provided as needed. Pt only able to ambulate to and from the bathroom today with increased fatigue.   -CT    Recorded by [RF] Shanti Rivera PTA [CT] Zara Israel, ARPAN [CT] Zara Israel, ARPAN    Vital Signs    Intra SpO2 (%) 82   coughing  -RF      O2 Delivery Intra Treatment room air  -RF      Post SpO2 (%) 95  -RF      O2 Delivery Post Treatment room air  -RF      Recorded by [RF] Shanti Rivera PTA      Cognitive Assessment/Intervention    Current Cognitive/Communication Assessment functional  -RF functional  -CT functional  -CT    Orientation Status oriented to;person  -RF oriented to;person  -CT oriented to;person  -CT    Follows Commands/Answers Questions able to follow single-step instructions;75% of the time  -RF able to follow single-step instructions;100% of the time  -CT     Personal Safety decreased awareness, need for assist;decreased awareness, need for safety  -RF decreased awareness, need for assist;decreased awareness, need for safety  -CT decreased awareness, need for assist;decreased awareness, need for safety  -CT    Personal Safety Interventions fall prevention program maintained;muscle strengthening facilitated;gait belt;nonskid shoes/slippers when out of bed  -RF fall prevention program maintained;gait belt;muscle strengthening  facilitated;nonskid shoes/slippers when out of bed  -CT fall prevention program maintained;gait belt;muscle strengthening facilitated;nonskid shoes/slippers when out of bed  -CT    Recorded by [RF] Shanti Rivera PTA [CT] Zara Israel, PT [CT] Zara Israel, PT    Bed Mobility, Assessment/Treatment    Bed Mobility, Scoot/Bridge, [place]  moderate assist (50% patient effort)  -CT moderate assist (50% patient effort)  -CT    Bed Mob, Supine to Sit, [place]  moderate assist (50% patient effort)  -CT moderate assist (50% patient effort)  -CT    Bed Mobility, Safety Issues  decreased use of arms for pushing/pulling;decreased use of legs for bridging/pushing  -CT decreased use of arms for pushing/pulling;decreased use of legs for bridging/pushing  -CT    Bed Mobility, Comment Pt up in chair with aide upon entering  -RF      Recorded by [RF] Shanti Rivera PTA [CT] Zara Israel, PT [CT] Zara Israel, PT    Transfer Assessment/Treatment    Transfers, Sit-Stand [place] minimum assist (75% patient effort)  -RF minimum assist (75% patient effort)  -CT minimum assist (75% patient effort)  -CT    Transfers, Stand-Sit [place] minimum assist (75% patient effort)  -RF minimum assist (75% patient effort)  -CT minimum assist (75% patient effort)  -CT    Transfers, Sit-Stand-Sit, Assist Device rolling walker  -RF rolling walker  -CT rolling walker  -CT    Transfer, Impairments strength decreased  -RF strength decreased  -CT strength decreased  -CT    Recorded by [RF] Shanti Rivera, VEL [CT] Zara Israel, PT [CT] Zara Israel, PT    Gait Assessment/Treatment    Gait, Flathead Level 1 person + 1 person to manage equipment;verbal cues required;nonverbal cues required (demo/gesture);minimum assist (75% patient effort)  -RF moderate assist (50% patient effort);1 person + 1 person to manage equipment;verbal cues required;nonverbal cues required (demo/gesture)  -CT moderate assist (50% patient  effort);1 person + 1 person to manage equipment;verbal cues required;nonverbal cues required (demo/gesture)  -CT    Gait, Assistive Device rolling walker  -RF rolling walker  -CT rolling walker  -CT    Gait, Distance (Feet) 55  -RF 50  -CT 15   15 ft to and 15 ft from bathroom  -CT    Gait, Gait Pattern Analysis swing-to gait  -RF swing-to gait  -CT swing-to gait  -CT    Gait, Gait Deviations   marium decreased;forward flexed posture;step length decreased  -CT    Gait, Impairments strength decreased  -RF strength decreased  -CT strength decreased  -CT    Gait, Comment requires cues for walker mgmt and navigation  -RF      Recorded by [RF] Shanti Rivera PTA [CT] Zara Israel, PT [CT] Zara Israel PT    Motor Skills/Interventions    Additional Documentation   Balance Skills Training (Group)  -CT    Recorded by   [CT] Zara Israel, ARPAN    Balance Skills Training    Standing-Level of Assistance Minimum assistance  -RF Minimum assistance  -CT Minimum assistance  -CT    Static Standing Balance Support assistive device  -RF assistive device  -CT assistive device  -CT    Standing-Balance Activities Weight Shift A-P;Weight Shift R-L  -RF Weight Shift A-P;Weight Shift R-L  -CT Weight Shift A-P;Weight Shift R-L  -CT    Standing Balance # of Minutes   5   for hygeine   -CT    Stepping Strategy Assessment (Balance Skills)  pt performed static standing w/ AD for hygeine  -CT     Recorded by [RF] Shanti Rivera PTA [CT] Zara Israel, PT [CT] Zara Israel PT    Therapy Exercises    Bilateral Lower Extremities AROM:;10 reps;sitting  -RF AROM:;10 reps;sitting  -CT AROM:;10 reps;sitting  -CT    Recorded by [RF] Shanti Rivera PTA [CT] Zara Israel, PT [CT] Zara Israel, PT    Positioning and Restraints    Pre-Treatment Position sitting in chair/recliner  -RF in bed  -CT in bed  -CT    Post Treatment Position chair  -RF chair  -CT chair  -CT    In Chair reclined;call light within reach;encouraged to call for  assist;with family/caregiver  -RF sitting;call light within reach;encouraged to call for assist;with family/caregiver;notified nsg  -CT sitting;call light within reach;encouraged to call for assist;with family/caregiver;notified nsg  -CT    Recorded by [RF] Shanti Rivera, PTA [CT] Zara Israel, PT [CT] Zara Israel, PT      User Key  (r) = Recorded By, (t) = Taken By, (c) = Cosigned By    Initials Name Effective Dates    CT Zara Israel, PT 03/14/16 -     RF Shanti Rivera, PTA 07/19/17 -       EDUCATION  The patient has been educated in the following areas:   Dysphagia (Swallowing Impairment).    SLP Recommendation and Plan   1. Continue current modified po diet mechanical soft, ground meats, thin liquids. Assist w/ intake PRN 2/2 ams w/ dementia.   2. Meds crushed in puree.  3. Universal aspiration precautions.   4. FELY precautions.   No further formal SLP f/u recommended.     Thank you-  Marcelina Rivera M.S., [facility]/SLP                                                    Time Calculation:       Therapy Charges for Today     Code Description Service Date Service Provider Modifiers Qty    72636464459 HC ST SWALLOWING CURRENT STATUS 11/1/2017 Marcelina Rivera, MS CCC-SLP GN, CI 1    [id number] HC ST SWALLOWING PROJECTED 11/1/2017 Marcelina Rivera, MS CCC-SLP GN, CI 1    [id number] HC ST SWALLOWING DISCHARGE 11/1/2017 Marcelina Rivera MS CCC-SLP GN, CI 1    [id number] HC ST MOTION FLUORO EVAL SWALLOW 7 11/1/2017 Marcelina Rivera MS CCC-SLP GN 1    [id number] HC ST TREATMENT SWALLOW 2 11/2/2017 Marcelina Rivera MS CCC-SLP GN 1          SLP G-Codes  SLP NOMS Used?: Yes  Functional Limitations: Swallowing  Swallow Current Status (): At least 1 percent but less than 20 percent impaired, limited or restricted  Swallow Goal Status (): At least 1 percent but less than 20 percent impaired, limited or restricted  Swallow Discharge Status (): At least 1 percent but less than 20 percent  impaired, limited or restricted      Marcelina Rivera, MS CCC-SLP  11/2/2017

## 2017-11-02 NOTE — PROGRESS NOTES
LOS: 7 days     Chief Complaint:  Pulmonology is following for pulmonary nodule    Subjective     Interval History: patient is resting in a the chair this morning.  Family is at bedside.    History taken from: Chart RN    Review of Systems:     Review of Systems - History obtained from unobtainable from patient due to mental status                    Objective     Vital Signs  Temp:  [97.7 °F (36.5 °C)-98.3 °F (36.8 °C)] 97.8 °F (36.6 °C)  Heart Rate:  [63-84] 73  Resp:  [18-28] 23  BP: (100-123)/(50-90) 104/53  Body mass index is 34.98 kg/(m^2).    Intake/Output Summary (Last 24 hours) at 11/02/17 1004  Last data filed at 11/02/17 0500   Gross per 24 hour   Intake              170 ml   Output             3275 ml   Net            -3105 ml          Physical Exam:  GENERAL APPEARANCE: Well developed, well nourished, alert and cooperative, and appears to be in no acute distress.    HEAD: normocephalic.    EYES: PERRL    NECK: Neck supple.     CARDIAC: Normal S1 and S2. No S3, S4 or murmurs. Rhythm is regular. There is no peripheral edema, cyanosis or pallor. Extremities are warm and well perfused. Capillary refill is less than 2 seconds. No carotid bruits.No JVD pulses are equal bilaterally    RESPIRATORY: Clear to auscultation and percussion without rales, rhonchi, wheezing or diminished breath sounds.    GI: Positive bowel sounds. Soft, nondistended, nontender.     MUSCULOSKELTAL: No significant deformity or joint abnormality. No edema. Peripheral pulses intact.     NEUROLOGICAL: Unable to assess due to mental status    PSYCHIATRIC: Alert to self.                Results Review:                I reviewed the patient's new clinical results.  I reviewed the patient's new imaging results and agree with the interpretation.    Results from last 7 days  Lab Units 11/02/17  0041 11/01/17  0954 10/31/17  0331   WBC 10*3/mm3 14.80* 15.30* 7.99   HEMOGLOBIN g/dL 12.3 12.3 11.8*   PLATELETS 10*3/mm3 323 290 205       Results  from last 7 days  Lab Units 11/02/17  0824 11/02/17  0041 11/01/17  0954 10/31/17  0331   SODIUM mmol/L  --  137 138 141   POTASSIUM mmol/L  --  4.0 4.4 3.6   CHLORIDE mmol/L  --  96* 99 104   CO2 mmol/L  --  30.3 30.5 31.3   BUN mg/dL  --  27* 22* 11   CREATININE mg/dL  --  1.06 0.92 0.78   CALCIUM mg/dL  --  8.7 8.8 8.8   GLUCOSE mg/dL  --  321* 342* 140*   MAGNESIUM mg/dL 2.1 2.3 1.8 1.6*     Lab Results   Component Value Date    INR 1.14 (H) 10/26/2017    PROTIME 14.8 10/26/2017       Results from last 7 days  Lab Units 10/31/17  0331   ALK PHOS U/L 64   BILIRUBIN mg/dL 0.4   ALT (SGPT) U/L 19   AST (SGOT) U/L 21         Imaging Results (last 24 hours)     Procedure Component Value Units Date/Time    FL Video Swallow [[id number]] Updated:  11/01/17 1626             Medication Review:   Scheduled Medications:    acetylcysteine 600 mg Inhalation Q12H   amiodarone 200 mg Oral TID   atorvastatin 80 mg Oral Nightly   carvedilol 6.25 mg Oral BID With Meals   ceftriaxone 1 g Intravenous Q24H   cetirizine 5 mg Oral Daily   cholecalciferol 50,000 Units Oral Weekly   clopidogrel 75 mg Oral Daily   ferrous sulfate 325 mg Oral Daily With Breakfast   furosemide 40 mg Intravenous Q12H   hydrocortisone  Rectal BID   insulin aspart 0-7 Units Subcutaneous 4x Daily AC & at Bedtime   insulin aspart 0-7 Units Subcutaneous Once   insulin aspart 5 Units Subcutaneous TID With Meals   insulin detemir 25 Units Subcutaneous Nightly   ipratropium 0.5 mg Nebulization 4x Daily - RT   methylPREDNISolone sodium succinate 40 mg Intravenous Q12H   metroNIDAZOLE 500 mg Intravenous Q8H   pantoprazole 40 mg Oral QAM   Risaquad-2 1 capsule Oral Daily   rivaroxaban 15 mg Oral Daily With Dinner   rivastigmine 1 patch Transdermal Daily   sennosides-docusate sodium 2 tablet Oral Nightly   valsartan 80 mg Oral Q24H     Continuous infusions:       Assessment/Plan        Respiratory failure/hypoxia:  patient is no longer wheezing.  Will decrease solumedrol  to 20 mg IV.    Cough has also decreased.  Will continue scheduled inhalants and PRN neb treatments.    WBC 14.80.  Continue IV antibiotics. . Continue lasix BID.  Creatinine is 1.06.  Creatinine has increased will hold extra dose of lasix today.  Continue to monitor.    Patient was assessed by speech therapy and had a video swallow done.  Waiting for further recommendations.    Pulmonary nodules:  3 nodules measuring 7 mm, 6 mm, 8 mm.  Will follow with patient on the out patient basis for further monitoring.    Magnesium is 2.1.  Continue to monitor.    Patient Active Problem List   Diagnosis Code   • NSTEMI (non-ST elevated myocardial infarction) I21.4   • Essential hypertension I10   • Type II diabetes mellitus E11.9   • Dementia F03.90   • Sepsis secondary to UTI A41.9, N39.0   • Pulmonary nodules, bilateral R91.8   • RBBB I45.10   • Chronic right basal ganglia lacunar infarct on previous head CT I63.9   • Advanced age R54   • Hiatal hernia K44.9   • GERD (gastroesophageal reflux disease) K21.9   • Acute respiratory failure with hypoxia J96.01   • Sepsis due to bilateral pneumonia J18.9, A41.9             LILY Arce  11/02/17  10:04 AM        Attestation: Scribed for Dr. Nash, by LILY Marrero      I, Wesley Nash M.D. attest that the above note accurately reflects the work and decisions made  by me.  Patient was seen and evaluated by Dr. Nash, including history of present illness, physical exam, assessment, and treatment plan.  The above note was reviewed and edited by Dr. Nash.

## 2017-11-03 ENCOUNTER — APPOINTMENT (OUTPATIENT)
Dept: GENERAL RADIOLOGY | Facility: HOSPITAL | Age: 82
End: 2017-11-03

## 2017-11-03 LAB
ANION GAP SERPL CALCULATED.3IONS-SCNC: 8.8 MMOL/L (ref 3.6–11.2)
BUN BLD-MCNC: 27 MG/DL (ref 7–21)
BUN/CREAT SERPL: 23.9 (ref 7–25)
CALCIUM SPEC-SCNC: 8.8 MG/DL (ref 7.7–10)
CHLORIDE SERPL-SCNC: 97 MMOL/L (ref 99–112)
CO2 SERPL-SCNC: 32.2 MMOL/L (ref 24.3–31.9)
CREAT BLD-MCNC: 1.13 MG/DL (ref 0.43–1.29)
DEPRECATED RDW RBC AUTO: 57.2 FL (ref 37–54)
ERYTHROCYTE [DISTWIDTH] IN BLOOD BY AUTOMATED COUNT: 18.3 % (ref 11.5–14.5)
GFR SERPL CREATININE-BSD FRML MDRD: 46 ML/MIN/1.73
GLUCOSE BLD-MCNC: 219 MG/DL (ref 70–110)
GLUCOSE BLDC GLUCOMTR-MCNC: 187 MG/DL (ref 70–130)
GLUCOSE BLDC GLUCOMTR-MCNC: 188 MG/DL (ref 70–130)
GLUCOSE BLDC GLUCOMTR-MCNC: 282 MG/DL (ref 70–130)
GLUCOSE BLDC GLUCOMTR-MCNC: 405 MG/DL (ref 70–130)
HCT VFR BLD AUTO: 37.6 % (ref 37–47)
HGB BLD-MCNC: 11.6 G/DL (ref 12–16)
MCH RBC QN AUTO: 26.5 PG (ref 27–33)
MCHC RBC AUTO-ENTMCNC: 30.9 G/DL (ref 33–37)
MCV RBC AUTO: 86 FL (ref 80–94)
OSMOLALITY SERPL CALC.SUM OF ELEC: 287.5 MOSM/KG (ref 273–305)
PLATELET # BLD AUTO: 294 10*3/MM3 (ref 130–400)
PMV BLD AUTO: 10.9 FL (ref 6–10)
POTASSIUM BLD-SCNC: 3.9 MMOL/L (ref 3.5–5.3)
RBC # BLD AUTO: 4.37 10*6/MM3 (ref 4.2–5.4)
SODIUM BLD-SCNC: 138 MMOL/L (ref 135–153)
WBC NRBC COR # BLD: 10.98 10*3/MM3 (ref 4.5–12.5)

## 2017-11-03 PROCEDURE — 97530 THERAPEUTIC ACTIVITIES: CPT

## 2017-11-03 PROCEDURE — 94799 UNLISTED PULMONARY SVC/PX: CPT

## 2017-11-03 PROCEDURE — 99232 SBSQ HOSP IP/OBS MODERATE 35: CPT | Performed by: INTERNAL MEDICINE

## 2017-11-03 PROCEDURE — 71010 XR CHEST AP: CPT | Performed by: RADIOLOGY

## 2017-11-03 PROCEDURE — 63710000001 PREDNISONE PER 1 MG: Performed by: INTERNAL MEDICINE

## 2017-11-03 PROCEDURE — 85027 COMPLETE CBC AUTOMATED: CPT | Performed by: INTERNAL MEDICINE

## 2017-11-03 PROCEDURE — 97116 GAIT TRAINING THERAPY: CPT

## 2017-11-03 PROCEDURE — 80048 BASIC METABOLIC PNL TOTAL CA: CPT | Performed by: INTERNAL MEDICINE

## 2017-11-03 PROCEDURE — 63710000001 INSULIN ASPART PER 5 UNITS: Performed by: INTERNAL MEDICINE

## 2017-11-03 PROCEDURE — 82962 GLUCOSE BLOOD TEST: CPT

## 2017-11-03 PROCEDURE — 63710000001 INSULIN ASPART PER 5 UNITS: Performed by: PHYSICIAN ASSISTANT

## 2017-11-03 PROCEDURE — 71010 HC CHEST AP: CPT

## 2017-11-03 PROCEDURE — 99233 SBSQ HOSP IP/OBS HIGH 50: CPT | Performed by: INTERNAL MEDICINE

## 2017-11-03 RX ORDER — METRONIDAZOLE 250 MG/1
500 TABLET ORAL EVERY 8 HOURS SCHEDULED
Status: DISCONTINUED | OUTPATIENT
Start: 2017-11-03 | End: 2017-11-06 | Stop reason: HOSPADM

## 2017-11-03 RX ORDER — PREDNISONE 20 MG/1
20 TABLET ORAL
Status: DISCONTINUED | OUTPATIENT
Start: 2017-11-03 | End: 2017-11-04

## 2017-11-03 RX ORDER — FUROSEMIDE 20 MG/1
20 TABLET ORAL DAILY
Status: DISCONTINUED | OUTPATIENT
Start: 2017-11-03 | End: 2017-11-06 | Stop reason: HOSPADM

## 2017-11-03 RX ORDER — CEFDINIR 300 MG/1
300 CAPSULE ORAL EVERY 12 HOURS SCHEDULED
Status: DISCONTINUED | OUTPATIENT
Start: 2017-11-03 | End: 2017-11-06 | Stop reason: HOSPADM

## 2017-11-03 RX ORDER — PREDNISONE 20 MG/1
40 TABLET ORAL
Status: DISCONTINUED | OUTPATIENT
Start: 2017-11-03 | End: 2017-11-03

## 2017-11-03 RX ADMIN — VALSARTAN 80 MG: 80 TABLET ORAL at 10:13

## 2017-11-03 RX ADMIN — Medication 1 CAPSULE: at 10:14

## 2017-11-03 RX ADMIN — PREDNISONE 20 MG: 20 TABLET ORAL at 11:54

## 2017-11-03 RX ADMIN — RIVAROXABAN 15 MG: 15 TABLET, FILM COATED ORAL at 17:49

## 2017-11-03 RX ADMIN — METRONIDAZOLE 500 MG: 250 TABLET ORAL at 15:05

## 2017-11-03 RX ADMIN — IPRATROPIUM BROMIDE 0.5 MG: 0.5 SOLUTION RESPIRATORY (INHALATION) at 07:27

## 2017-11-03 RX ADMIN — CARVEDILOL 6.25 MG: 6.25 TABLET, FILM COATED ORAL at 10:14

## 2017-11-03 RX ADMIN — ACETYLCYSTEINE 800 MG: 200 SOLUTION ORAL; RESPIRATORY (INHALATION) at 07:27

## 2017-11-03 RX ADMIN — HYDROCORTISONE 2.5%: 25 CREAM TOPICAL at 17:50

## 2017-11-03 RX ADMIN — IPRATROPIUM BROMIDE AND ALBUTEROL SULFATE 3 ML: .5; 3 SOLUTION RESPIRATORY (INHALATION) at 19:47

## 2017-11-03 RX ADMIN — INSULIN ASPART 8 UNITS: 100 INJECTION, SOLUTION INTRAVENOUS; SUBCUTANEOUS at 17:49

## 2017-11-03 RX ADMIN — INSULIN ASPART 2 UNITS: 100 INJECTION, SOLUTION INTRAVENOUS; SUBCUTANEOUS at 17:49

## 2017-11-03 RX ADMIN — HYDROCORTISONE 2.5%: 25 CREAM TOPICAL at 10:15

## 2017-11-03 RX ADMIN — RIVASTIGMINE 1 PATCH: 9.5 PATCH, EXTENDED RELEASE TRANSDERMAL at 10:13

## 2017-11-03 RX ADMIN — FUROSEMIDE 20 MG: 20 TABLET ORAL at 11:54

## 2017-11-03 RX ADMIN — IPRATROPIUM BROMIDE 0.5 MG: 0.5 SOLUTION RESPIRATORY (INHALATION) at 13:09

## 2017-11-03 RX ADMIN — FERROUS SULFATE TAB 325 MG (65 MG ELEMENTAL FE) 325 MG: 325 (65 FE) TAB at 10:14

## 2017-11-03 RX ADMIN — CEFDINIR 300 MG: 300 CAPSULE ORAL at 21:40

## 2017-11-03 RX ADMIN — GUAIFENESIN 200 MG: 100 SOLUTION ORAL at 17:53

## 2017-11-03 RX ADMIN — CEFDINIR 300 MG: 300 CAPSULE ORAL at 11:54

## 2017-11-03 RX ADMIN — INSULIN ASPART 7 UNITS: 100 INJECTION, SOLUTION INTRAVENOUS; SUBCUTANEOUS at 21:39

## 2017-11-03 RX ADMIN — METRONIDAZOLE 500 MG: 500 INJECTION, SOLUTION INTRAVENOUS at 06:16

## 2017-11-03 RX ADMIN — CARVEDILOL 6.25 MG: 6.25 TABLET, FILM COATED ORAL at 17:49

## 2017-11-03 RX ADMIN — PANTOPRAZOLE SODIUM 40 MG: 40 TABLET, DELAYED RELEASE ORAL at 06:16

## 2017-11-03 RX ADMIN — ATORVASTATIN CALCIUM 80 MG: 40 TABLET, FILM COATED ORAL at 21:40

## 2017-11-03 RX ADMIN — AMIODARONE HYDROCHLORIDE 200 MG: 200 TABLET ORAL at 15:05

## 2017-11-03 RX ADMIN — AMIODARONE HYDROCHLORIDE 200 MG: 200 TABLET ORAL at 10:14

## 2017-11-03 RX ADMIN — CETIRIZINE HYDROCHLORIDE 5 MG: 10 TABLET ORAL at 10:14

## 2017-11-03 RX ADMIN — IPRATROPIUM BROMIDE 0.5 MG: 0.5 SOLUTION RESPIRATORY (INHALATION) at 00:41

## 2017-11-03 RX ADMIN — INSULIN ASPART 8 UNITS: 100 INJECTION, SOLUTION INTRAVENOUS; SUBCUTANEOUS at 11:54

## 2017-11-03 RX ADMIN — AMIODARONE HYDROCHLORIDE 200 MG: 200 TABLET ORAL at 21:40

## 2017-11-03 RX ADMIN — ACETYLCYSTEINE 600 MG: 200 SOLUTION ORAL; RESPIRATORY (INHALATION) at 19:48

## 2017-11-03 RX ADMIN — INSULIN ASPART 4 UNITS: 100 INJECTION, SOLUTION INTRAVENOUS; SUBCUTANEOUS at 11:55

## 2017-11-03 RX ADMIN — METRONIDAZOLE 500 MG: 250 TABLET ORAL at 21:40

## 2017-11-03 RX ADMIN — CLOPIDOGREL 75 MG: 75 TABLET, FILM COATED ORAL at 10:14

## 2017-11-03 NOTE — THERAPY TREATMENT NOTE
Acute Care - Physical Therapy Treatment Note   [place]     Patient Name: Floridalma Bryant  : 1934  MRN: [id number]  Today's Date: 11/3/2017  Onset of Illness/Injury or Date of Surgery Date: 10/26/17  Date of Referral to PT: 10/30/17  Referring Physician: Debi    Admit Date: 10/26/2017    Visit Dx:    ICD-10-CM ICD-9-CM   1. NSTEMI (non-ST elevated myocardial infarction) I21.4 410.70   2. Acute cystitis without hematuria N30.00 595.0     Patient Active Problem List   Diagnosis   • NSTEMI (non-ST elevated myocardial infarction)   • Essential hypertension   • Type II diabetes mellitus   • Dementia   • Sepsis secondary to UTI   • Pulmonary nodules, bilateral   • RBBB   • Chronic right basal ganglia lacunar infarct on previous head CT   • Advanced age   • Hiatal hernia   • GERD (gastroesophageal reflux disease)   • Acute respiratory failure with hypoxia   • Sepsis due to bilateral pneumonia               Adult Rehabilitation Note       17 1520 17 1007 17 1016    Rehab Assessment/Intervention    Discipline physical therapist  -BC physical therapy assistant  -RF physical therapist  -CT    Document Type therapy note (daily note)  -BC therapy note (daily note)  -RF therapy note (daily note)  -CT    Subjective Information agree to therapy  -BC agree to therapy  -RF agree to therapy;complains of;weakness  -CT    Patient Effort, Rehab Treatment adequate  -BC adequate  -RF adequate  -CT    Symptoms Noted During/After Treatment fatigue  -BC fatigue;shortness of breath  -RF fatigue  -CT    Precautions/Limitations  fall precautions;oxygen therapy device and L/min   bernard cath  -RF fall precautions;oxygen therapy device and L/min   foly cath  -CT    Patient Response to Treatment  Pt tolerant to treatment session with no increased fatigue or pain noted. Rest breaks provided as necessary.   -RF Pt tolerated treatment session well with rest breaks provided as needed.   -CT    Recorded by [BC] Alberta CANSECO  Pelon, PT [RF] Shanti Rivera, VEL [CT] Zara Israel, PT    Vital Signs    Intra SpO2 (%)  82   coughing  -RF     O2 Delivery Intra Treatment  room air  -RF     Post SpO2 (%)  95  -RF     O2 Delivery Post Treatment  room air  -RF     Recorded by  [RF] Shanti Rivera PTA     Pain Assessment    Pain Assessment Fox-Baker FACES  -BC      Fox-Baker FACES Pain Rating 4  -BC      Pain Location Generalized  -BC      Recorded by [BC] Alberta Bird PT      Cognitive Assessment/Intervention    Current Cognitive/Communication Assessment functional  -BC functional  -RF functional  -CT    Orientation Status oriented to;person  -BC oriented to;person  -RF oriented to;person  -CT    Follows Commands/Answers Questions  able to follow single-step instructions;75% of the time  -RF able to follow single-step instructions;100% of the time  -CT    Personal Safety  decreased awareness, need for assist;decreased awareness, need for safety  -RF decreased awareness, need for assist;decreased awareness, need for safety  -CT    Personal Safety Interventions  fall prevention program maintained;muscle strengthening facilitated;gait belt;nonskid shoes/slippers when out of bed  -RF fall prevention program maintained;gait belt;muscle strengthening facilitated;nonskid shoes/slippers when out of bed  -CT    Recorded by [BC] Alberta Bird, PT [RF] Shanti Rivera, VEL [CT] Zara Israel, PT    Bed Mobility, Assessment/Treatment    Bed Mobility, Scoot/Bridge, Kimberly   moderate assist (50% patient effort)  -CT    Bed Mob, Supine to Sit, Kimberly   moderate assist (50% patient effort)  -CT    Bed Mobility, Safety Issues   decreased use of arms for pushing/pulling;decreased use of legs for bridging/pushing  -CT    Bed Mobility, Comment  Pt up in chair with aide upon entering  -RF     Recorded by  [RF] Shanti Rivera, VEL [CT] Zara Israel, PT    Transfer Assessment/Treatment    Transfers, Sit-Stand [place] minimum  assist (75% patient effort)  -BC minimum assist (75% patient effort)  -RF minimum assist (75% patient effort)  -CT    Transfers, Stand-Sit [place] minimum assist (75% patient effort)  -BC minimum assist (75% patient effort)  -RF minimum assist (75% patient effort)  -CT    Transfers, Sit-Stand-Sit, Assist Device rolling walker  -BC rolling walker  -RF rolling walker  -CT    Transfer, Impairments strength decreased  -BC strength decreased  -RF strength decreased  -CT    Recorded by [BC] Alberta Bird, PT [RF] Shanti Rivera, PTA [CT] Zara Israel, PT    Gait Assessment/Treatment    Gait, [place] Level 1 person + 1 person to manage equipment;verbal cues required;nonverbal cues required (demo/gesture);minimum assist (75% patient effort)  -BC 1 person + 1 person to manage equipment;verbal cues required;nonverbal cues required (demo/gesture);minimum assist (75% patient effort)  -RF moderate assist (50% patient effort);1 person + 1 person to manage equipment;verbal cues required;nonverbal cues required (demo/gesture)  -CT    Gait, Assistive Device rolling walker  -BC rolling walker  -RF rolling walker  -CT    Gait, Distance (Feet) 40  -BC 55  -RF 50  -CT    Gait, Gait Pattern Analysis swing-to gait  -BC swing-to gait  -RF swing-to gait  -CT    Gait, Impairments strength decreased  -BC strength decreased  -RF strength decreased  -CT    Gait, Comment  requires cues for walker mgmt and navigation  -RF     Recorded by [BC] Alberta Bird, PT [RF] Shanti Rivera, PTA [CT] Zara Israel, PT    Balance Skills Training    Standing-Level of Assistance Minimum assistance  -BC Minimum assistance  -RF Minimum assistance  -CT    Static Standing Balance Support assistive device  -BC assistive device  -RF assistive device  -CT    Standing-Balance Activities Weight Shift A-P;Weight Shift R-L  -BC Weight Shift A-P;Weight Shift R-L  -RF Weight Shift A-P;Weight Shift R-L  -CT    Stepping Strategy Assessment (Balance  Skills)   pt performed static standing w/ AD for hygeine  -CT    Recorded by [BC] Alberta Bird, PT [RF] Shanti Rivera, PTA [CT] Zara Israel PT    Therapy Exercises    Bilateral Lower Extremities AROM:;10 reps;sitting  -BC AROM:;10 reps;sitting  -RF AROM:;10 reps;sitting  -CT    Recorded by [BC] Alberta Bird, PT [RF] Shanti Rivera, PTA [CT] Zara Israel PT    Positioning and Restraints    Pre-Treatment Position in bed  -BC sitting in chair/recliner  -RF in bed  -CT    Post Treatment Position chair  -BC chair  -RF chair  -CT    In Chair notified nsg;sitting;call light within reach;encouraged to call for assist;with family/caregiver  -BC reclined;call light within reach;encouraged to call for assist;with family/caregiver  -RF sitting;call light within reach;encouraged to call for assist;with family/caregiver;notified nsg  -CT    Recorded by [BC] Ablerta Bird, PT [RF] Shanti Rivera, VEL [CT] Zara Israel PT      User Key  (r) = Recorded By, (t) = Taken By, (c) = Cosigned By    Initials Name Effective Dates    BC Alberta Bird, PT 03/14/16 -     CT Zara Israel, PT 03/14/16 -     RF Shanti Rivera, PTA 07/19/17 -                 IP PT Goals       10/30/17 1744          Transfer Training PT LTG    Transfer Training PT LTG, Date Established 10/30/17  -CT      Transfer Training PT LTG, Time to Achieve by discharge  -CT      Transfer Training PT LTG, Activity Type bed to chair /chair to bed;sit to stand/stand to sit  -CT      Transfer Training PT LTG, [place] Level contact guard assist;minimum assist (75% patient effort)  -CT      Transfer Training PT LTG, Assist Device other (see comments)   with appropriate AD  -CT      Gait Training PT LTG    Gait Training Goal PT LTG, Date Established 10/30/17  -CT      Gait Training Goal PT LTG, Time to Achieve by discharge  -CT      Gait Training Goal PT LTG, [place] Level minimum assist (75% patient effort);1 person + 1 person to manage  equipment  -CT      Gait Training Goal PT LTG, Assist Device other (see comments)   with appropriate AD  -CT      Gait Training Goal PT LTG, Distance to Achieve 200  -CT        User Key  (r) = Recorded By, (t) = Taken By, (c) = Cosigned By    Initials Name Provider Type    CT Zara Israel PT Physical Therapist          Physical Therapy Education     Title: PT OT SLP Therapies (Done)     Topic: Physical Therapy (Done)     Point: Mobility training (Done)    Learning Progress Summary    Learner Readiness Method Response Comment Documented by Status   Patient Acceptance E VU  BC 11/03/17 1522 Done    Acceptance E VU,NR   11/02/17 1010 Done    Acceptance E NL,NR   11/02/17 0216 Active    Acceptance E NR  CT 11/01/17 1020 Active    Acceptance E NR,NL   11/01/17 0037 Active    Acceptance E VU,NR  CT 10/31/17 1048 Done    Acceptance E NR   10/31/17 0337 Active    Acceptance E NR  CT 10/30/17 1745 Active   Family Acceptance E VU,NR   11/02/17 1010 Done    Acceptance E VU   11/01/17 0039 Done    Acceptance E VU   10/31/17 0339 Done               Point: Home exercise program (Done)    Learning Progress Summary    Learner Readiness Method Response Comment Documented by Status   Patient Acceptance E VU  BC 11/03/17 1522 Done    Acceptance E VU,NR   11/02/17 1010 Done    Acceptance E NL,NR   11/02/17 0216 Active    Acceptance E NR  CT 11/01/17 1020 Active    Acceptance E NR,NL  KF 11/01/17 0037 Active    Acceptance E VU,NR  CT 10/31/17 1048 Done    Acceptance E NR   10/31/17 0337 Active    Acceptance E NR  CT 10/30/17 1745 Active   Family Acceptance E VU,NR   11/02/17 1010 Done    Acceptance E VU  KF 11/01/17 0039 Done    Acceptance E VU  KF 10/31/17 0339 Done               Point: Body mechanics (Done)    Learning Progress Summary    Learner Readiness Method Response Comment Documented by Status   Patient Acceptance E VU  BC 11/03/17 1522 Done    Acceptance E VU,NR   11/02/17 1010 Done    Acceptance E  NL,NR   11/02/17 0216 Active    Acceptance E NR  CT 11/01/17 1020 Active    Acceptance E NR,NL   11/01/17 0037 Active    Acceptance E VU,NR  CT 10/31/17 1048 Done    Acceptance E NR   10/31/17 0337 Active    Acceptance E NR  CT 10/30/17 1745 Active   Family Acceptance E VU,NR   11/02/17 1010 Done    Acceptance E VU   11/01/17 0039 Done    Acceptance E VU   10/31/17 0339 Done               Point: Precautions (Done)    Learning Progress Summary    Learner Readiness Method Response Comment Documented by Status   Patient Acceptance E VU  BC 11/03/17 1522 Done    Acceptance E VU,NR   11/02/17 1010 Done    Acceptance E NL,NR   11/02/17 0216 Active    Acceptance E NR  CT 11/01/17 1020 Active    Acceptance E NR,NL   11/01/17 0037 Active    Acceptance E VU,NR  CT 10/31/17 1048 Done    Acceptance E NR   10/31/17 0337 Active    Acceptance E NR  CT 10/30/17 1745 Active   Family Acceptance E VU,NR   11/02/17 1010 Done    Acceptance E VU   11/01/17 0039 Done    Acceptance E VU   10/31/17 0339 Done                      User Key     Initials Effective Dates Name Provider Type Discipline    BC 03/14/16 -  Alberta Bird, PT Physical Therapist PT    CT 03/14/16 -  Zara Israel, PT Physical Therapist PT     07/19/17 -  Shanti Rivera PTA Physical Therapy Assistant PT     07/19/17 -  Mercedes Ta, RN Registered Nurse Nurse                    PT Recommendation and Plan  Anticipated Equipment Needs At Discharge: front wheeled walker  Anticipated Discharge Disposition: home with assist, home with home health  Planned Therapy Interventions: balance training, bed mobility training, gait training, home exercise program, neuromuscular re-education, patient/family education, strengthening, transfer training, postural re-education  PT Frequency: 3-5 times/wk, per priority policy             Outcome Measures       11/03/17 1500 11/02/17 1000 11/01/17 1000    How much help from another person do you  currently need...    Turning from your back to your side while in flat bed without using bedrails? 3  -BC 3  -RF 3  -CT    Moving from lying on back to sitting on the side of a flat bed without bedrails? 3  -BC 3  -RF 3  -CT    Moving to and from a bed to a chair (including a wheelchair)? 3  -BC 3  -RF 3  -CT    Standing up from a chair using your arms (e.g., wheelchair, bedside chair)? 3  -BC 3  -RF 3  -CT    Climbing 3-5 steps with a railing? 2  -BC 2  -RF 2  -CT    To walk in hospital room? 2  -BC 2  -RF 2  -CT    AM-PAC 6 Clicks Score 16  -BC 16  -RF 16  -CT    Functional Assessment    Outcome Measure Options AM-PAC 6 Clicks Basic Mobility (PT)  -BC AM-PAC 6 Clicks Basic Mobility (PT)  -RF AM-PAC 6 Clicks Basic Mobility (PT)  -CT      User Key  (r) = Recorded By, (t) = Taken By, (c) = Cosigned By    Initials Name Provider Type    BC Alberta Bird, PT Physical Therapist    CT Zara Israel, PT Physical Therapist    RF Shanti Rivera, PTA Physical Therapy Assistant           Time Calculation:         PT Charges       11/03/17 1523          Time Calculation    Start Time --   30  -BC      PT Received On 11/03/17  -BC        User Key  (r) = Recorded By, (t) = Taken By, (c) = Cosigned By    Initials Name Provider Type    BC Alberta Bird, PT Physical Therapist          Therapy Charges for Today     Code Description Service Date Service Provider Modifiers Qty    44519751765 HC GAIT TRAINING EA 15 MIN 11/3/2017 Alberta Bird, PT GP 1    39425830423 HC PT THERAPEUTIC ACT EA 15 MIN 11/3/2017 Alberta Bird PT GP 1    26621602130 HC PT THER SUPP EA 15 MIN 11/3/2017 Alberta Bird, PT GP 2          PT G-Codes  Outcome Measure Options: AM-PAC 6 Clicks Basic Mobility (PT)  Score: 15  Functional Limitation: Mobility: Walking and moving around  Mobility: Walking and Moving Around Current Status (): At least 40 percent but less than 60 percent impaired, limited or restricted  Mobility: Walking and Moving  Around Goal Status (): At least 20 percent but less than 40 percent impaired, limited or restricted    Alberta Bird, PT  11/3/2017

## 2017-11-03 NOTE — PLAN OF CARE
Problem: Patient Care Overview (Adult)  Goal: Plan of Care Review  Outcome: Ongoing (interventions implemented as appropriate)  Goal: Adult Individualization and Mutuality  Outcome: Ongoing (interventions implemented as appropriate)    Problem: Pressure Ulcer Risk (Alfred Scale) (Adult,Obstetrics,Pediatric)  Goal: Identify Related Risk Factors and Signs and Symptoms  Outcome: Outcome(s) achieved Date Met:  11/03/17  Goal: Skin Integrity  Outcome: Ongoing (interventions implemented as appropriate)    Problem: Cardiac Output, Decreased (Adult)  Goal: Identify Related Risk Factors and Signs and Symptoms  Outcome: Outcome(s) achieved Date Met:  11/03/17  Goal: Adequate Cardiac Output/Effective Tissue Perfusion  Outcome: Ongoing (interventions implemented as appropriate)

## 2017-11-03 NOTE — PROGRESS NOTES
LOS: 8 days     Chief Complaint:  Pulmonology is following for pulmonary nodule    Subjective     Interval History: Ms. Bryant is resting in bed.  Her daughter is at bedside.    History taken from: chart RN    Review of Systems:     Review of Systems - History obtained from unobtainable from patient due to mental status                    Objective     Vital Signs  Temp:  [97.7 °F (36.5 °C)-98.8 °F (37.1 °C)] 98 °F (36.7 °C)  Heart Rate:  [56-84] 79  Resp:  [19-20] 20  BP: (115-146)/(53-80) 129/70  Body mass index is 35.14 kg/(m^2).    Intake/Output Summary (Last 24 hours) at 11/03/17 1038  Last data filed at 11/03/17 0727   Gross per 24 hour   Intake              860 ml   Output             1750 ml   Net             -890 ml     I/O this shift:  In: 560 [P.O.:560]  Out: -     Physical Exam:  GENERAL APPEARANCE: Well developed, well nourished, alert and cooperative, and appears to be in no acute distress.    HEAD: normocephalic.    EYES: PERRL    NECK: Neck supple.     CARDIAC: Normal S1 and S2. No S3, S4 or murmurs. Rhythm is regular. There is no peripheral edema, cyanosis or pallor. Extremities are warm and well perfused. Capillary refill is less than 2 seconds. No carotid bruits.    RESPIRATORY: Clear to auscultation and percussion without rales, rhonchi, wheezing or diminished breath sounds.    GI: Positive bowel sounds. Soft, nondistended, nontender.     MUSCULOSKELTAL: No significant deformity or joint abnormality. No edema. Peripheral pulses intact.     NEUROLOGICAL: Strength and sensation symmetric and intact throughout.     PSYCHIATRIC: The mental examination revealed the patient was oriented to person.                Results Review:                I reviewed the patient's new clinical results.  I reviewed the patient's new imaging results and agree with the interpretation.    Results from last 7 days  Lab Units 11/03/17  0148 11/02/17  0041 11/01/17  0954   WBC 10*3/mm3 10.98 14.80* 15.30*   HEMOGLOBIN g/dL  11.6* 12.3 12.3   PLATELETS 10*3/mm3 294 323 290       Results from last 7 days  Lab Units 11/03/17  0148 11/02/17  0824 11/02/17  0041 11/01/17  0954   SODIUM mmol/L 138  --  137 138   POTASSIUM mmol/L 3.9  --  4.0 4.4   CHLORIDE mmol/L 97*  --  96* 99   CO2 mmol/L 32.2*  --  30.3 30.5   BUN mg/dL 27*  --  27* 22*   CREATININE mg/dL 1.13  --  1.06 0.92   CALCIUM mg/dL 8.8  --  8.7 8.8   GLUCOSE mg/dL 219*  --  321* 342*   MAGNESIUM mg/dL  --  2.1 2.3 1.8     Lab Results   Component Value Date    INR 1.14 (H) 10/26/2017    PROTIME 14.8 10/26/2017       Results from last 7 days  Lab Units 10/31/17  0331   ALK PHOS U/L 64   BILIRUBIN mg/dL 0.4   ALT (SGPT) U/L 19   AST (SGOT) U/L 21         Imaging Results (last 24 hours)     Procedure Component Value Units Date/Time    XR Chest AP [[id number]] Collected:  11/03/17 0757     Updated:  11/03/17 0859    Narrative:       XR CHEST AP-     HISTORY:  Persistent cough; I21.4-Non-ST elevation (NSTEMI) myocardial  infarction; N30.00-Acute cystitis without hematuria          COMPARISON: 11/01/2017      TECHNIQUE: Single frontal view of the chest.     FINDINGS:     Significantly improved aeration of the lungs. Minimal residual right  basilar consolidation.  The cardiac silhouette is normal. The pulmonary vasculature is  unremarkable.  There is no evidence of an acute osseous abnormality.   There are no suspicious-appearing parenchymal soft tissue nodules.            Impression:       Marked overall improvement.         This report was finalized on 11/3/2017 8:57 AM by Dr. Daryl Rosas MD.                Medication Review:   Scheduled Medications:    acetylcysteine 600 mg Inhalation Q12H   amiodarone 200 mg Oral TID   atorvastatin 80 mg Oral Nightly   carvedilol 6.25 mg Oral BID With Meals   cefdinir 300 mg Oral Q12H   cetirizine 5 mg Oral Daily   cholecalciferol 50,000 Units Oral Weekly   clopidogrel 75 mg Oral Daily   ferrous sulfate 325 mg Oral Daily With Breakfast   furosemide  20 mg Oral Daily   hydrocortisone  Rectal BID   insulin aspart 0-7 Units Subcutaneous 4x Daily AC & at Bedtime   insulin aspart 0-7 Units Subcutaneous Once   insulin aspart 8 Units Subcutaneous TID With Meals   insulin detemir 30 Units Subcutaneous Nightly   ipratropium 0.5 mg Nebulization 4x Daily - RT   metroNIDAZOLE 500 mg Oral Q8H   pantoprazole 40 mg Oral QAM   predniSONE 20 mg Oral Daily With Breakfast   Risaquad-2 1 capsule Oral Daily   rivaroxaban 15 mg Oral Daily With Dinner   rivastigmine 1 patch Transdermal Daily   sennosides-docusate sodium 2 tablet Oral Nightly   valsartan 80 mg Oral Q24H     Continuous infusions:       Assessment/Plan       respiratory failure/ hypoxia:  She is doing well today.  She is saturating 97% on 3 liters nasal cannula.  Titrated oxygen down to 2 liters.  She tolerated this maintaining a saturation of 95%.    Discontinued solumedrol and started her on prednisone.  WBC 10.98.    Microbiology Results (last 10 days)     Procedure Component Value - Date/Time    Clostridium Difficile Toxin - Stool, Per Rectum [[id number]] Collected:  10/29/17 1352    Lab Status:  Final result Specimen:  Stool from Per Rectum Updated:  10/29/17 1536    Narrative:       The following orders were created for panel order Clostridium Difficile Toxin - Stool, Per Rectum.  Procedure                               Abnormality         Status                     ---------                               -----------         ------                     Clostridium Difficile To...[[id number]]                      Final result                 Please view results for these tests on the individual orders.    Clostridium Difficile Toxin, PCR - Stool, Per Rectum [[id number]] Collected:  10/29/17 1352    Lab Status:  Final result Specimen:  Stool from Per Rectum Updated:  10/29/17 1536     C. Difficile Toxins by PCR Negative     027 Toxin Presumptive Negative    Narrative:         For In Vitro Diagnostic use  only.  027-NAP1-BI results are NOT intended to guide treatment. 027 typing is relative to PCR ribotyping and shown to be equivalent to B1 typing by restriction endonuclease analysis or NAP1 typing by pulse field gel electrophoresis.    Blood Culture - Blood, [[id number]]  (Normal) Collected:  10/28/17 0927    Lab Status:  Final result Specimen:  Blood from Hand, Left Updated:  11/02/17 1416     Blood Culture No growth at 5 days    Urine Culture - Urine, Urine, Clean Catch [[id number]]  (Normal) Collected:  10/28/17 0851    Lab Status:  Final result Specimen:  Urine from Urine, Catheter Updated:  10/30/17 0656     Urine Culture No growth    Blood Culture - Blood, [[id number]]  (Normal) Collected:  10/28/17 0811    Lab Status:  Final result Specimen:  Blood from Hand, Right Updated:  11/02/17 0946     Blood Culture No growth at 5 days    Blood Culture - Blood, [[id number]]  (Normal) Collected:  10/26/17 0424    Lab Status:  Final result Specimen:  Blood from Arm, Right Updated:  10/31/17 0431     Blood Culture No growth at 5 days    Influenza Antigen, Rapid - Swab, Nasopharynx [[id number]]  (Normal) Collected:  10/26/17 0339    Lab Status:  Final result Specimen:  Swab from Nasopharynx Updated:  10/26/17 0401     Influenza A Ag, EIA Negative     Influenza B Ag, EIA Negative    Urine Culture - Urine, Urine, Clean Catch [[id number]]  (Abnormal)  (Susceptibility) Collected:  10/26/17 0304    Lab Status:  Final result Specimen:  Urine from Urine, Catheter Updated:  10/28/17 1617     Urine Culture --      >100,000 CFU/mL Klebsiella pneumoniae (A)    Susceptibility      Klebsiella pneumoniae     SIXTO     Amikacin <=16 ug/ml Susceptible     Amoxicillin + Clavulanate <=8/4 ug/ml Susceptible     Ampicillin >16 ug/ml Resistant     Ampicillin + Sulbactam <=8/4 ug/ml Susceptible     Aztreonam <=8 ug/ml Susceptible     Cefazolin <=8 ug/ml Susceptible     Ciprofloxacin <=1 ug/ml Susceptible     Doripenem <=0.5 ug/ml Susceptible      Ertapenem <=1 ug/ml Susceptible     Gentamicin <=4 ug/ml Susceptible     Imipenem <=1 ug/ml Susceptible     Levofloxacin <=2 ug/ml Susceptible     Nitrofurantoin <=32 ug/ml Susceptible     Piperacillin + Tazobactam <=16 ug/ml Susceptible     Tetracycline <=4 ug/ml Susceptible     Tobramycin <=4 ug/ml Susceptible     Trimethoprim + Sulfamethoxazole <=2/38 ug/ml Susceptible                    Blood Culture - Blood, [[id number]]  (Normal) Collected:  10/26/17 0250    Lab Status:  Final result Specimen:  Blood from Arm, Right Updated:  10/31/17 0307     Blood Culture No growth at 5 days          Creatinine went up to 1.13. Discontinued lasix.  Will continue to monitor.    Chest xray reviewed.    Will order her an incentive spirometer.    Video swallow was reviewed.        Pulmonary nodules:  Will follow up with patient on the outpatient basis.    Patient could be transferred to the medical floor.      Patient Active Problem List   Diagnosis Code   • NSTEMI (non-ST elevated myocardial infarction) I21.4   • Essential hypertension I10   • Type II diabetes mellitus E11.9   • Dementia F03.90   • Sepsis secondary to UTI A41.9, N39.0   • Pulmonary nodules, bilateral R91.8   • RBBB I45.10   • Chronic right basal ganglia lacunar infarct on previous head CT I63.9   • Advanced age R54   • Hiatal hernia K44.9   • GERD (gastroesophageal reflux disease) K21.9   • Acute respiratory failure with hypoxia J96.01   • Sepsis due to bilateral pneumonia J18.9, A41.9             LILY Arce  11/03/17  10:38 AM        Attestation: Scribed for Dr. Nash, by LILY Marrero        I, Wesley Nash M.D. attest that the above note accurately reflects the work and decisions made  by me.  Patient was seen and evaluated by Dr. Nash, including history of present illness, physical exam, assessment, and treatment plan.  The above note was reviewed and edited by Dr. Nash.

## 2017-11-03 NOTE — PROGRESS NOTES
Discharge Planning Assessment   Pedro     Patient Name: Floridalma Bryant  MRN: [id number]  Today's Date: 11/3/2017    Admit Date: 10/26/2017          Discharge Plan       11/03/17 1108    Case Management/Social Work Plan    Plan SS spoke with Dr. Carrera and he states the pt is ready for discharge.  SS contacted [facility] and spoke with Rehan and she states they have to preauthorize the secondary insurance before they can accept the pt.  SS spoke with Dr. Carrera regarding her progress with her mobility and he states that the pt is walking in the hallway.  SS spoke with the daughter, Jaz regarding nursing home placement vs. home with home health.  Pt's daughter states the pt was able to walk to the bathroom alone at night and change her own  briefs.  Pt's daughter states she feels the pt will need nursing home placement and requests nursing home placement in [facility]. [facility].  SS will continue to follow.      Patient/Family In Agreement With Plan yes             Expected Discharge Date and Time     Expected Discharge Date Expected Discharge Time    Nov 4, 2017                Radha Zendejas

## 2017-11-03 NOTE — PROGRESS NOTES
Discharge Planning Assessment  DEQUAN Vasquez     Patient Name: Floridalma Bryant  MRN: [id number]  Today's Date: 11/3/2017    Admit Date: 10/26/2017          Discharge Needs Assessment     None            Discharge Plan       11/03/17 1602    Case Management/Social Work Plan    Plan SS spoke with pt, pt's spouse and daughter Jaz on this date.  Pt family request short term nursing home placement at [facility] in [facility].  SS spoke with Cari at [facility] in [facility] at [phone].  Cari to call pt's daughter directly regarding insurance and payment.  Per Cari pt meets criteria for admit to short term rehab and [facility] should have available bed next week.  SS will follow up am Monday to assess bed  availabiltiy.      Patient/Family In Agreement With Plan yes        Discharge Placement     No information found        Expected Discharge Date and Time     Expected Discharge Date Expected Discharge Time    Nov 4, 2017               Demographic Summary     None            Functional Status     None            Psychosocial     None            Abuse/Neglect     None            Legal     None            Substance Abuse     None            Patient Forms     None          Dhara Lew

## 2017-11-03 NOTE — PLAN OF CARE
Problem: Pressure Ulcer Risk (Alfred Scale) (Adult,Obstetrics,Pediatric)  Goal: Identify Related Risk Factors and Signs and Symptoms  Outcome: Ongoing (interventions implemented as appropriate)  Goal: Skin Integrity  Outcome: Ongoing (interventions implemented as appropriate)    Problem: Cardiac Output, Decreased (Adult)  Goal: Identify Related Risk Factors and Signs and Symptoms  Outcome: Ongoing (interventions implemented as appropriate)  Goal: Adequate Cardiac Output/Effective Tissue Perfusion  Outcome: Ongoing (interventions implemented as appropriate)

## 2017-11-03 NOTE — PROGRESS NOTES
I have seen the patient in conjunction with Anya ROBLERO and daughter    History of presenting illness:  History is from the patient as well as the patient's daughter.  Patient appears be doing very well, daughter relates that the patient was able to walk in the rosa yesterday.  She ate all her breakfast.  Patient herself complains of no chest pain shortness of breath or abdominal pain.  She overall appears be comfortable now on 2 L.    Vital Signs  Temp:  [97.7 °F (36.5 °C)-98.8 °F (37.1 °C)] 98 °F (36.7 °C)  Heart Rate:  [56-91] 77  Resp:  [19-20] 20  BP: (115-146)/(53-80) 133/69  Body mass index is 35.14 kg/(m^2).      Intake/Output Summary (Last 24 hours) at 11/03/17 0951  Last data filed at 11/03/17 0727   Gross per 24 hour   Intake              860 ml   Output             1750 ml   Net             -890 ml     With Sales catheter patient has had 935 cc out overnight.    Intake & Output (last 3 days)       10/31 0701 - 11/01 0700 11/01 0701 - 11/02 0700 11/02 0701 - 11/03 0700 11/03 0701 - 11/04 0700    P.O. 540 220 525 560    IV Piggyback 50 50 50     Total Intake(mL/kg) 590 (7.3) 270 (3.4) 575 (7.3) 560 (7.1)    Urine (mL/kg/hr) 935 (0.5) 3275 (1.7) 1925 (1)     Stool  0 (0)      Total Output 935 3275 1925      Net -345 -3005 -1350 +560            Unmeasured Stool Occurrence  7 x            Physical exam:  Physical Exam   Constitutional: Well-developed, well-nourished.  Awake, pleasant  Head: Normocephalic and atraumatic.  Hearing appears to be intact but diminished, mucous membranes are moist.  Speech is normal  Eyes:  Pupils are equal, round,  No scleral icterus.   Cardiovascular: Normal rate, regular rhythm and normal heart sounds.  No murmur rub or gallop  Pulmonary/Chest: Bilateral breath sounds modest air excursion but with some end-expiratory wheezing heard bilaterally   Abdominal: Soft, flat, bowel sounds are active, nondistended nontender.  No peritoneal signs   Musculoskeletal: Strength is symmetric  she moves all extremities on command ,No edema. she has excellent palpable distal pulses   Neurological: Nonfocal, alert.    Skin: Skin is warm and dry.   Psychiatric:  Normal mood and affect.   Sales catheter is in place medium yellow clear urine    Telemetry: sinus, reviewed    Results Review:  Lab Results   Component Value Date    WBC 10.98 11/03/2017    HGB 11.6 (L) 11/03/2017    HCT 37.6 11/03/2017    MCV 86.0 11/03/2017     11/03/2017     Lab Results   Component Value Date    GLUCOSE 219 (H) 11/03/2017    BUN 27 (H) 11/03/2017    CREATININE 1.13 11/03/2017    EGFRIFNONA 46 (L) 11/03/2017    BCR 23.9 11/03/2017    CO2 32.2 (H) 11/03/2017    CALCIUM 8.8 11/03/2017    ALBUMIN 3.30 (L) 10/31/2017    LABIL2 1.2 (L) 10/31/2017    AST 21 10/31/2017    ALT 19 10/31/2017       Imaging Results (last 72 hours)     Procedure Component Value Units Date/Time    XR Chest 1 View [[id number]] Collected:  10/28/17 0925     Updated:  10/28/17 0928    Narrative:       Technique: Frontal view the chest.     COMPARISON:  10/26/2017     INDICATION:     fever; I21.4-Non-ST elevation (NSTEMI) myocardial  infarction; N30.00-Acute cystitis without hematuria      FINDINGS:    Coarsened interstitial markings. Cardiomegaly is noted.  Prominent interstitial markings are noted. There are small bilateral  pleural effusions.        Impression:       Radiographic changes of congestive failure.     This report was finalized on 10/28/2017 9:26 AM by Dr. Harshal Stoddard MD.       XR Chest AP [[id number]] Collected:  10/30/17 0701     Updated:  10/30/17 0751    Narrative:       XR CHEST AP-     HISTORY:  CHF; I21.4-Non-ST elevation (NSTEMI) myocardial infarction;  N30.00-Acute cystitis without hematuria          COMPARISON: 10/28/2017      TECHNIQUE: Single frontal view of the chest.     FINDINGS:     Bibasilar consolidation.  Mild CHF.  There is no evidence of an acute osseous abnormality.   There are no suspicious-appearing parenchymal soft  tissue nodules.            Impression:       Bibasilar consolidation and mild CHF.         This report was finalized on 10/30/2017 7:49 AM by Dr. Daryl Rosas MD.         Echo EF 40 %    Chest x-ray reviewed,Improving airspace disease      Assessment/Plan     Active Problems:  NSTEMI (non-ST elevated myocardial infarction), being medically managed, daughter is in agreement with this. Patient is on Plavix, beta blocker, and high-dose statin.  She remains asymptomatic     Persistent cough, appears to be improving, she did pass her swallowing evaluation.    Systolic congestive heart failure, on ARB, Lasix changed to by mouth she appears compensated    Pneumonia, patient is now on Rocephin/Flagyl.  She has had 7 days of Rocephin, I'm changing to Omnicef will give an additional 7 days of this and Flagyl.  She has improved.    UTI, Klebsiella, Omnicef should cover this as well    Severe Sepsis secondary to pneumonia and UTI    Acute hypoxic respiratory failure secondary to*congestive heart failure in combination with pneumonia improved, oxygen has been able to be weaned down to 2 L.      Paroxysmal atrial fibrillation, now in sinus rhythm, she has been changed to oral amiodarone andon xarelto for stroke prevention    Functional decline , progressing well with therapy    Baseline dementia    Diabetes on steroids glucoses high, I have adjusted insulin and steroids are being weaned, follow    Hypophosphatemia, earlier in the stay, recheck in a.m.    Patient being moved to oral medications, feel she is stable for transfer to the floor.  alerted that Patient ready for discharge but apparently her insurance is going to require 5 days advanced notice    Tong Carrera MD  11/03/17  9:51 AM

## 2017-11-04 LAB
ANION GAP SERPL CALCULATED.3IONS-SCNC: 5.5 MMOL/L (ref 3.6–11.2)
BASOPHILS # BLD AUTO: 0 10*3/MM3 (ref 0–0.3)
BASOPHILS NFR BLD AUTO: 0 % (ref 0–2)
BUN BLD-MCNC: 24 MG/DL (ref 7–21)
BUN/CREAT SERPL: 22.6 (ref 7–25)
CALCIUM SPEC-SCNC: 9.2 MG/DL (ref 7.7–10)
CHLORIDE SERPL-SCNC: 101 MMOL/L (ref 99–112)
CO2 SERPL-SCNC: 31.5 MMOL/L (ref 24.3–31.9)
CREAT BLD-MCNC: 1.06 MG/DL (ref 0.43–1.29)
DEPRECATED RDW RBC AUTO: 57.4 FL (ref 37–54)
EOSINOPHIL # BLD AUTO: 0.01 10*3/MM3 (ref 0–0.7)
EOSINOPHIL NFR BLD AUTO: 0.1 % (ref 0–7)
ERYTHROCYTE [DISTWIDTH] IN BLOOD BY AUTOMATED COUNT: 18.1 % (ref 11.5–14.5)
GFR SERPL CREATININE-BSD FRML MDRD: 50 ML/MIN/1.73
GLUCOSE BLD-MCNC: 293 MG/DL (ref 70–110)
GLUCOSE BLDC GLUCOMTR-MCNC: 140 MG/DL (ref 70–130)
GLUCOSE BLDC GLUCOMTR-MCNC: 212 MG/DL (ref 70–130)
GLUCOSE BLDC GLUCOMTR-MCNC: 229 MG/DL (ref 70–130)
GLUCOSE BLDC GLUCOMTR-MCNC: 315 MG/DL (ref 70–130)
GLUCOSE BLDC GLUCOMTR-MCNC: 344 MG/DL (ref 70–130)
HCT VFR BLD AUTO: 38.6 % (ref 37–47)
HGB BLD-MCNC: 11.8 G/DL (ref 12–16)
IMM GRANULOCYTES # BLD: 0.03 10*3/MM3 (ref 0–0.03)
IMM GRANULOCYTES NFR BLD: 0.3 % (ref 0–0.5)
LYMPHOCYTES # BLD AUTO: 1.47 10*3/MM3 (ref 1–3)
LYMPHOCYTES NFR BLD AUTO: 13.2 % (ref 16–46)
MCH RBC QN AUTO: 26.5 PG (ref 27–33)
MCHC RBC AUTO-ENTMCNC: 30.6 G/DL (ref 33–37)
MCV RBC AUTO: 86.7 FL (ref 80–94)
MONOCYTES # BLD AUTO: 1.31 10*3/MM3 (ref 0.1–0.9)
MONOCYTES NFR BLD AUTO: 11.8 % (ref 0–12)
NEUTROPHILS # BLD AUTO: 8.29 10*3/MM3 (ref 1.4–6.5)
NEUTROPHILS NFR BLD AUTO: 74.6 % (ref 40–75)
OSMOLALITY SERPL CALC.SUM OF ELEC: 290.5 MOSM/KG (ref 273–305)
PHOSPHATE SERPL-MCNC: 3.5 MG/DL (ref 2.7–4.5)
PLATELET # BLD AUTO: 316 10*3/MM3 (ref 130–400)
PMV BLD AUTO: 11 FL (ref 6–10)
POTASSIUM BLD-SCNC: 3.8 MMOL/L (ref 3.5–5.3)
RBC # BLD AUTO: 4.45 10*6/MM3 (ref 4.2–5.4)
SODIUM BLD-SCNC: 138 MMOL/L (ref 135–153)
WBC NRBC COR # BLD: 11.11 10*3/MM3 (ref 4.5–12.5)

## 2017-11-04 PROCEDURE — 80048 BASIC METABOLIC PNL TOTAL CA: CPT | Performed by: INTERNAL MEDICINE

## 2017-11-04 PROCEDURE — 94799 UNLISTED PULMONARY SVC/PX: CPT

## 2017-11-04 PROCEDURE — 63710000001 INSULIN DETEMIR PER 5 UNITS: Performed by: INTERNAL MEDICINE

## 2017-11-04 PROCEDURE — 84100 ASSAY OF PHOSPHORUS: CPT | Performed by: INTERNAL MEDICINE

## 2017-11-04 PROCEDURE — 63710000001 INSULIN ASPART PER 5 UNITS: Performed by: INTERNAL MEDICINE

## 2017-11-04 PROCEDURE — 99232 SBSQ HOSP IP/OBS MODERATE 35: CPT | Performed by: INTERNAL MEDICINE

## 2017-11-04 PROCEDURE — 63710000001 INSULIN ASPART PER 5 UNITS: Performed by: PHYSICIAN ASSISTANT

## 2017-11-04 PROCEDURE — 85025 COMPLETE CBC W/AUTO DIFF WBC: CPT | Performed by: INTERNAL MEDICINE

## 2017-11-04 PROCEDURE — 82962 GLUCOSE BLOOD TEST: CPT

## 2017-11-04 PROCEDURE — 63710000001 PREDNISONE PER 1 MG: Performed by: INTERNAL MEDICINE

## 2017-11-04 RX ORDER — PREDNISONE 10 MG/1
10 TABLET ORAL
Status: DISCONTINUED | OUTPATIENT
Start: 2017-11-05 | End: 2017-11-06 | Stop reason: HOSPADM

## 2017-11-04 RX ADMIN — INSULIN ASPART 3 UNITS: 100 INJECTION, SOLUTION INTRAVENOUS; SUBCUTANEOUS at 08:48

## 2017-11-04 RX ADMIN — ATORVASTATIN CALCIUM 80 MG: 40 TABLET, FILM COATED ORAL at 20:28

## 2017-11-04 RX ADMIN — AMIODARONE HYDROCHLORIDE 200 MG: 200 TABLET ORAL at 16:48

## 2017-11-04 RX ADMIN — INSULIN ASPART 4 UNITS: 100 INJECTION, SOLUTION INTRAVENOUS; SUBCUTANEOUS at 16:49

## 2017-11-04 RX ADMIN — IPRATROPIUM BROMIDE 0.5 MG: 0.5 SOLUTION RESPIRATORY (INHALATION) at 06:55

## 2017-11-04 RX ADMIN — PANTOPRAZOLE SODIUM 40 MG: 40 TABLET, DELAYED RELEASE ORAL at 04:54

## 2017-11-04 RX ADMIN — INSULIN ASPART 10 UNITS: 100 INJECTION, SOLUTION INTRAVENOUS; SUBCUTANEOUS at 16:49

## 2017-11-04 RX ADMIN — INSULIN ASPART 8 UNITS: 100 INJECTION, SOLUTION INTRAVENOUS; SUBCUTANEOUS at 08:49

## 2017-11-04 RX ADMIN — CETIRIZINE HYDROCHLORIDE 5 MG: 10 TABLET ORAL at 10:17

## 2017-11-04 RX ADMIN — RIVASTIGMINE 1 PATCH: 9.5 PATCH, EXTENDED RELEASE TRANSDERMAL at 10:16

## 2017-11-04 RX ADMIN — INSULIN DETEMIR 35 UNITS: 100 INJECTION, SOLUTION SUBCUTANEOUS at 20:28

## 2017-11-04 RX ADMIN — INSULIN ASPART 10 UNITS: 100 INJECTION, SOLUTION INTRAVENOUS; SUBCUTANEOUS at 13:06

## 2017-11-04 RX ADMIN — METRONIDAZOLE 500 MG: 250 TABLET ORAL at 04:54

## 2017-11-04 RX ADMIN — VALSARTAN 80 MG: 80 TABLET ORAL at 10:17

## 2017-11-04 RX ADMIN — PREDNISONE 20 MG: 20 TABLET ORAL at 10:17

## 2017-11-04 RX ADMIN — FUROSEMIDE 20 MG: 20 TABLET ORAL at 10:16

## 2017-11-04 RX ADMIN — CEFDINIR 300 MG: 300 CAPSULE ORAL at 20:28

## 2017-11-04 RX ADMIN — Medication: at 08:49

## 2017-11-04 RX ADMIN — CEFDINIR 300 MG: 300 CAPSULE ORAL at 10:16

## 2017-11-04 RX ADMIN — CARVEDILOL 6.25 MG: 6.25 TABLET, FILM COATED ORAL at 16:48

## 2017-11-04 RX ADMIN — INSULIN ASPART 7 UNITS: 100 INJECTION, SOLUTION INTRAVENOUS; SUBCUTANEOUS at 20:28

## 2017-11-04 RX ADMIN — RIVAROXABAN 15 MG: 15 TABLET, FILM COATED ORAL at 16:48

## 2017-11-04 RX ADMIN — Medication 1 CAPSULE: at 10:17

## 2017-11-04 RX ADMIN — METRONIDAZOLE 500 MG: 250 TABLET ORAL at 20:28

## 2017-11-04 RX ADMIN — METRONIDAZOLE 500 MG: 250 TABLET ORAL at 13:05

## 2017-11-04 RX ADMIN — AMIODARONE HYDROCHLORIDE 200 MG: 200 TABLET ORAL at 10:17

## 2017-11-04 RX ADMIN — CLOPIDOGREL 75 MG: 75 TABLET, FILM COATED ORAL at 10:17

## 2017-11-04 RX ADMIN — IPRATROPIUM BROMIDE 0.5 MG: 0.5 SOLUTION RESPIRATORY (INHALATION) at 01:26

## 2017-11-04 RX ADMIN — ACETYLCYSTEINE 600 MG: 200 SOLUTION ORAL; RESPIRATORY (INHALATION) at 18:33

## 2017-11-04 RX ADMIN — CARVEDILOL 6.25 MG: 6.25 TABLET, FILM COATED ORAL at 10:17

## 2017-11-04 RX ADMIN — IPRATROPIUM BROMIDE 0.5 MG: 0.5 SOLUTION RESPIRATORY (INHALATION) at 18:33

## 2017-11-04 RX ADMIN — AMIODARONE HYDROCHLORIDE 200 MG: 200 TABLET ORAL at 20:28

## 2017-11-04 RX ADMIN — ACETYLCYSTEINE 3 ML: 200 SOLUTION ORAL; RESPIRATORY (INHALATION) at 06:55

## 2017-11-04 RX ADMIN — FERROUS SULFATE TAB 325 MG (65 MG ELEMENTAL FE) 325 MG: 325 (65 FE) TAB at 10:18

## 2017-11-04 NOTE — PLAN OF CARE
Problem: Patient Care Overview (Adult)  Goal: Plan of Care Review  Outcome: Ongoing (interventions implemented as appropriate)  Goal: Adult Individualization and Mutuality  Outcome: Ongoing (interventions implemented as appropriate)  Goal: Discharge Needs Assessment  Outcome: Ongoing (interventions implemented as appropriate)    Problem: Pressure Ulcer Risk (Alfred Scale) (Adult,Obstetrics,Pediatric)  Goal: Skin Integrity  Outcome: Ongoing (interventions implemented as appropriate)    Problem: Cardiac Output, Decreased (Adult)  Goal: Adequate Cardiac Output/Effective Tissue Perfusion  Outcome: Ongoing (interventions implemented as appropriate)

## 2017-11-04 NOTE — PLAN OF CARE
Problem: Patient Care Overview (Adult)  Goal: Plan of Care Review  Outcome: Ongoing (interventions implemented as appropriate)    11/03/17 2356   Coping/Psychosocial Response Interventions   Plan Of Care Reviewed With patient   Patient Care Overview   Progress improving       Goal: Discharge Needs Assessment  Outcome: Ongoing (interventions implemented as appropriate)    10/27/17 1502 10/30/17 1738 11/03/17 2356   Discharge Needs Assessment   Concerns To Be Addressed --  --  no discharge needs identified   Readmission Within The Last 30 Days --  --  no previous admission in last 30 days   Equipment Needed After Discharge --  --  none   Discharge Facility/Level Of Care Needs home with home health;nursing facility, skilled --  --    Discharge Disposition still a patient --  --    Current Health   Anticipated Changes Related to Illness --  --  inability to care for self   Living Environment   Transportation Available car;family or friend will provide --  --    Self-Care   Equipment Currently Used at Home --  walker, rolling;wheelchair --          Problem: Pressure Ulcer Risk (Alfred Scale) (Adult,Obstetrics,Pediatric)  Goal: Skin Integrity  Outcome: Ongoing (interventions implemented as appropriate)    11/03/17 2356   Pressure Ulcer Risk (Alfred Scale) (Adult,Obstetrics,Pediatric)   Skin Integrity making progress toward outcome         Problem: Cardiac Output, Decreased (Adult)  Goal: Adequate Cardiac Output/Effective Tissue Perfusion  Outcome: Ongoing (interventions implemented as appropriate)    11/03/17 2356   Cardiac Output, Decreased (Adult)   Adequate Cardiac Output/Effective Tissue Perfusion making progress toward outcome

## 2017-11-04 NOTE — PROGRESS NOTES
I have seen the patient in conjunction with Kamla ROBLERO and /son from [place]    History of presenting illness:  Patient was sleeping on arrival but she was able to be awakened.  She denied any acute complaints.  Currently she did eat earlier today.  She denies any shortness of breath.    Vital Signs  Temp:  [97.7 °F (36.5 °C)-99 °F (37.2 °C)] 98.8 °F (37.1 °C)  Heart Rate:  [60-79] 70  Resp:  [16-22] 16  BP: (104-157)/(47-80) 142/64  Body mass index is 31.44 kg/(m^2).      Intake/Output Summary (Last 24 hours) at 11/04/17 1134  Last data filed at 11/04/17 0122   Gross per 24 hour   Intake              840 ml   Output              430 ml   Net              410 ml     With Sales catheter patient has had 935 cc out overnight.    Intake & Output (last 3 days)       11/01 0701 - 11/02 0700 11/02 0701 - 11/03 0700 11/03 0701 - 11/04 0700 11/04 0701 - 11/05 0700    P.O.      IV Piggyback 50 50      Total Intake(mL/kg) 270 (3.4) 575 (7.3) 1400 (17.4)     Urine (mL/kg/hr) 3275 (1.7) 1925 (1) 530 (0.3)     Stool 0 (0)  0 (0)     Total Output 3275 1925 530      Net -3005 -1350 +870              Unmeasured Stool Occurrence 7 x  3 x 1 x          Physical exam:  Physical Exam   Constitutional: Well-developed, well-nourished. Sleeping, awakened, pleasant  Head: Normocephalic and atraumatic.  Hearing appears to be intact but diminished, mucous membranes are moist.  Speech remains normal  Eyes:  Pupils are equal, round,  No scleral icterus.   Cardiovascular: Normal rate, regular rhythm and normal heart sounds.  No murmur rub or gallop  Pulmonary/Chest: Bilateral breath sounds diminished bases but no wheezing her today.  Fairly good air excursion.  Abdominal: Soft, flat, bowel sounds are active, nondistended nontender.  No peritoneal signs   Musculoskeletal: Strength is symmetric good palpable distal pulses  Neurological: As above sleeping, able to be awakened, nonfocal    Skin: Skin is warm and dry.   Sales catheter  is in place medium yellow clear urine    Telemetry: sinus, reviewed    Results Review:  Lab Results   Component Value Date    WBC 11.11 11/04/2017    HGB 11.8 (L) 11/04/2017    HCT 38.6 11/04/2017    MCV 86.7 11/04/2017     11/04/2017     Lab Results   Component Value Date    GLUCOSE 293 (H) 11/04/2017    BUN 24 (H) 11/04/2017    CREATININE 1.06 11/04/2017    EGFRIFNONA 50 (L) 11/04/2017    BCR 22.6 11/04/2017    CO2 31.5 11/04/2017    CALCIUM 9.2 11/04/2017    ALBUMIN 3.30 (L) 10/31/2017    LABIL2 1.2 (L) 10/31/2017    AST 21 10/31/2017    ALT 19 10/31/2017       Imaging Results (last 72 hours)     Procedure Component Value Units Date/Time    XR Chest 1 View [[id number]] Collected:  10/28/17 0925     Updated:  10/28/17 0928    Narrative:       Technique: Frontal view the chest.     COMPARISON:  10/26/2017     INDICATION:     fever; I21.4-Non-ST elevation (NSTEMI) myocardial  infarction; N30.00-Acute cystitis without hematuria      FINDINGS:    Coarsened interstitial markings. Cardiomegaly is noted.  Prominent interstitial markings are noted. There are small bilateral  pleural effusions.        Impression:       Radiographic changes of congestive failure.     This report was finalized on 10/28/2017 9:26 AM by Dr. Harshal Stoddard MD.       XR Chest AP [[id number]] Collected:  10/30/17 0701     Updated:  10/30/17 0751    Narrative:       XR CHEST AP-     HISTORY:  CHF; I21.4-Non-ST elevation (NSTEMI) myocardial infarction;  N30.00-Acute cystitis without hematuria          COMPARISON: 10/28/2017      TECHNIQUE: Single frontal view of the chest.     FINDINGS:     Bibasilar consolidation.  Mild CHF.  There is no evidence of an acute osseous abnormality.   There are no suspicious-appearing parenchymal soft tissue nodules.            Impression:       Bibasilar consolidation and mild CHF.         This report was finalized on 10/30/2017 7:49 AM by Dr. Daryl Rosas MD.         Echo EF 40 %    Chest x-ray 11/ improving  air space disease      Assessment/Plan     Active Problems:  NSTEMI (non-ST elevated myocardial infarction), being medically managed, . Patient is on Plavix, beta blocker, and high-dose statin.  She Is asymptomatic     Systolic congestive heart failure, on ARB, Lasix changed to by mouth , she continues to appear compensated.  Creatinine appears to be tolerating the Diovan addition.    Pneumonia, patient is now on Omnicef and Flagyl orally,  No change in cultures, continue steroid wean.    UTI, Klebsiella, completing Omnicef course    Severe Sepsis secondary to pneumonia and UTI, resolved    Acute hypoxic respiratory failure secondary to*congestive heart failure in combination with pneumonia stable on 2 L      Paroxysmal atrial fibrillation, now in sinus rhythm, she has been changed to oral amiodarone andon xarelto for stroke prevention    Functional decline , therapy as available    Baseline dementia    Diabetes still not controlled, insulin continues to be adjusted.  Steroids being weaned which should help this.      Hypophosphatemia, earlier in the stay, resolved    Awaiting placement a .    Tong Carrera MD  11/04/17  11:34 AM

## 2017-11-05 LAB
GLUCOSE BLDC GLUCOMTR-MCNC: 118 MG/DL (ref 70–130)
GLUCOSE BLDC GLUCOMTR-MCNC: 216 MG/DL (ref 70–130)
GLUCOSE BLDC GLUCOMTR-MCNC: 220 MG/DL (ref 70–130)
GLUCOSE BLDC GLUCOMTR-MCNC: 300 MG/DL (ref 70–130)
GLUCOSE BLDC GLUCOMTR-MCNC: 302 MG/DL (ref 70–130)

## 2017-11-05 PROCEDURE — 94799 UNLISTED PULMONARY SVC/PX: CPT

## 2017-11-05 PROCEDURE — 63710000001 INSULIN ASPART PER 5 UNITS: Performed by: INTERNAL MEDICINE

## 2017-11-05 PROCEDURE — 99233 SBSQ HOSP IP/OBS HIGH 50: CPT | Performed by: INTERNAL MEDICINE

## 2017-11-05 PROCEDURE — 63710000001 PREDNISONE PER 5 MG: Performed by: INTERNAL MEDICINE

## 2017-11-05 PROCEDURE — 63710000001 INSULIN DETEMIR PER 5 UNITS: Performed by: INTERNAL MEDICINE

## 2017-11-05 PROCEDURE — 82962 GLUCOSE BLOOD TEST: CPT

## 2017-11-05 RX ORDER — L.ACID,CASEI/B.ANIMAL/S.THERMO 16B CELL
1 CAPSULE ORAL DAILY
Status: DISCONTINUED | OUTPATIENT
Start: 2017-11-05 | End: 2017-11-05

## 2017-11-05 RX ADMIN — ATORVASTATIN CALCIUM 80 MG: 40 TABLET, FILM COATED ORAL at 20:47

## 2017-11-05 RX ADMIN — INSULIN ASPART 4 UNITS: 100 INJECTION, SOLUTION INTRAVENOUS; SUBCUTANEOUS at 12:15

## 2017-11-05 RX ADMIN — IPRATROPIUM BROMIDE 0.5 MG: 0.5 SOLUTION RESPIRATORY (INHALATION) at 18:15

## 2017-11-05 RX ADMIN — FUROSEMIDE 20 MG: 20 TABLET ORAL at 08:04

## 2017-11-05 RX ADMIN — AMIODARONE HYDROCHLORIDE 200 MG: 200 TABLET ORAL at 08:04

## 2017-11-05 RX ADMIN — PANTOPRAZOLE SODIUM 40 MG: 40 TABLET, DELAYED RELEASE ORAL at 05:29

## 2017-11-05 RX ADMIN — CEFDINIR 300 MG: 300 CAPSULE ORAL at 08:04

## 2017-11-05 RX ADMIN — HYDROCORTISONE 2.5%: 25 CREAM TOPICAL at 08:03

## 2017-11-05 RX ADMIN — INSULIN ASPART 10 UNITS: 100 INJECTION, SOLUTION INTRAVENOUS; SUBCUTANEOUS at 12:16

## 2017-11-05 RX ADMIN — INSULIN DETEMIR 35 UNITS: 100 INJECTION, SOLUTION SUBCUTANEOUS at 20:57

## 2017-11-05 RX ADMIN — CARVEDILOL 6.25 MG: 6.25 TABLET, FILM COATED ORAL at 17:42

## 2017-11-05 RX ADMIN — RIVAROXABAN 15 MG: 15 TABLET, FILM COATED ORAL at 17:42

## 2017-11-05 RX ADMIN — Medication: at 08:05

## 2017-11-05 RX ADMIN — CLOPIDOGREL 75 MG: 75 TABLET, FILM COATED ORAL at 08:04

## 2017-11-05 RX ADMIN — INSULIN ASPART 7 UNITS: 100 INJECTION, SOLUTION INTRAVENOUS; SUBCUTANEOUS at 21:06

## 2017-11-05 RX ADMIN — INSULIN ASPART 2 UNITS: 100 INJECTION, SOLUTION INTRAVENOUS; SUBCUTANEOUS at 17:42

## 2017-11-05 RX ADMIN — VALSARTAN 80 MG: 80 TABLET ORAL at 08:04

## 2017-11-05 RX ADMIN — CARVEDILOL 6.25 MG: 6.25 TABLET, FILM COATED ORAL at 08:04

## 2017-11-05 RX ADMIN — AMIODARONE HYDROCHLORIDE 200 MG: 200 TABLET ORAL at 17:42

## 2017-11-05 RX ADMIN — METRONIDAZOLE 500 MG: 250 TABLET ORAL at 05:28

## 2017-11-05 RX ADMIN — PREDNISONE 10 MG: 10 TABLET ORAL at 08:04

## 2017-11-05 RX ADMIN — AMIODARONE HYDROCHLORIDE 200 MG: 200 TABLET ORAL at 20:48

## 2017-11-05 RX ADMIN — METRONIDAZOLE 500 MG: 250 TABLET ORAL at 12:16

## 2017-11-05 RX ADMIN — METRONIDAZOLE 500 MG: 250 TABLET ORAL at 20:47

## 2017-11-05 RX ADMIN — FERROUS SULFATE TAB 325 MG (65 MG ELEMENTAL FE) 325 MG: 325 (65 FE) TAB at 08:04

## 2017-11-05 RX ADMIN — CETIRIZINE HYDROCHLORIDE 5 MG: 10 TABLET ORAL at 08:04

## 2017-11-05 RX ADMIN — HYDROCORTISONE 2.5%: 25 CREAM TOPICAL at 17:42

## 2017-11-05 RX ADMIN — INSULIN ASPART 10 UNITS: 100 INJECTION, SOLUTION INTRAVENOUS; SUBCUTANEOUS at 17:41

## 2017-11-05 RX ADMIN — CEFDINIR 300 MG: 300 CAPSULE ORAL at 20:47

## 2017-11-05 RX ADMIN — IPRATROPIUM BROMIDE 0.5 MG: 0.5 SOLUTION RESPIRATORY (INHALATION) at 06:21

## 2017-11-05 RX ADMIN — Medication 1 CAPSULE: at 08:04

## 2017-11-05 RX ADMIN — INSULIN ASPART 10 UNITS: 100 INJECTION, SOLUTION INTRAVENOUS; SUBCUTANEOUS at 08:05

## 2017-11-05 RX ADMIN — RIVASTIGMINE 1 PATCH: 9.5 PATCH, EXTENDED RELEASE TRANSDERMAL at 08:04

## 2017-11-05 NOTE — PROGRESS NOTES
"  I have seen the patient in conjunction with Kamla RN and , patient has been up to the bedside commode and  states she only required some minimal assistance but strength was okay.  Appetite was good this morning and she had all of her breakfast.    As above history from patient and nursing and     History of presenting illness:  Patient is awake, she states her breathing is \"okay\".  She's having no chest pain or abdominal pain.  She is unsure whether she is having any edema.  Patient's catheter has been removed and she has been able to void.  History is very limited due to her dementia.    Vital Signs  Temp:  [97.6 °F (36.4 °C)-98.7 °F (37.1 °C)] 97.8 °F (36.6 °C)  Heart Rate:  [62-76] 62  Resp:  [16-22] 20  BP: (117-152)/(55-71) 135/55  Body mass index is 31.44 kg/(m^2).      Intake/Output Summary (Last 24 hours) at 11/05/17 1043  Last data filed at 11/04/17 2050   Gross per 24 hour   Intake              360 ml   Output              600 ml   Net             -240 ml         Intake & Output (last 3 days)       11/02 0701 - 11/03 0700 11/03 0701 - 11/04 0700 11/04 0701 - 11/05 0700 11/05 0701 - 11/06 0700    P.O. 525 1400 720     IV Piggyback 50       Total Intake(mL/kg) 575 (7.3) 1400 (17.4) 720 (8.9)     Urine (mL/kg/hr) 1925 (1) 530 (0.3) 600 (0.3)     Stool  0 (0) 0 (0)     Total Output 1925 530 600      Net -1350 +870 +120              Unmeasured Urine Occurrence   1 x     Unmeasured Stool Occurrence  3 x 4 x 1 x          Physical exam:  Physical Exam   Constitutional: Well-developed, well-nourished. She appears to stress  Head: Normocephalic and atraumatic.  Hearing appears to be intact but remains diminished mucous membranes are moist.  Speech remains normal  Eyes:  Pupils are equal, round,  No scleral icterus.   Cardiovascular: Normal rate, regular rhythm and normal heart sounds.  No murmur rub or gallop  Pulmonary/Chest: Bilateral breath sounds some occasional end expiratory wheezing " heard but otherwise clear  Abdominal: Soft, flat, bowel sounds are active, nondistended nontender.  No peritoneal signs   Musculoskeletal: Strength is symmetric good palpable distal pulses, no edema   Neurological: Awake, alert, pleasant  Skin: Skin is warm and dry.   Sales catheter is out    Telemetry: sinus, reviewed    Results Review:  Lab Results   Component Value Date    WBC 11.11 11/04/2017    HGB 11.8 (L) 11/04/2017    HCT 38.6 11/04/2017    MCV 86.7 11/04/2017     11/04/2017     Lab Results   Component Value Date    GLUCOSE 293 (H) 11/04/2017    BUN 24 (H) 11/04/2017    CREATININE 1.06 11/04/2017    EGFRIFNONA 50 (L) 11/04/2017    BCR 22.6 11/04/2017    CO2 31.5 11/04/2017    CALCIUM 9.2 11/04/2017    ALBUMIN 3.30 (L) 10/31/2017    LABIL2 1.2 (L) 10/31/2017    AST 21 10/31/2017    ALT 19 10/31/2017       Imaging Results (last 72 hours)     Procedure Component Value Units Date/Time    XR Chest 1 View [[id number]] Collected:  10/28/17 0925     Updated:  10/28/17 0928    Narrative:       Technique: Frontal view the chest.     COMPARISON:  10/26/2017     INDICATION:     fever; I21.4-Non-ST elevation (NSTEMI) myocardial  infarction; N30.00-Acute cystitis without hematuria      FINDINGS:    Coarsened interstitial markings. Cardiomegaly is noted.  Prominent interstitial markings are noted. There are small bilateral  pleural effusions.        Impression:       Radiographic changes of congestive failure.     This report was finalized on 10/28/2017 9:26 AM by Dr. Harshal Stoddard MD.       XR Chest AP [[id number]] Collected:  10/30/17 0701     Updated:  10/30/17 0751    Narrative:       XR CHEST AP-     HISTORY:  CHF; I21.4-Non-ST elevation (NSTEMI) myocardial infarction;  N30.00-Acute cystitis without hematuria          COMPARISON: 10/28/2017      TECHNIQUE: Single frontal view of the chest.     FINDINGS:     Bibasilar consolidation.  Mild CHF.  There is no evidence of an acute osseous abnormality.   There are no  suspicious-appearing parenchymal soft tissue nodules.            Impression:       Bibasilar consolidation and mild CHF.         This report was finalized on 10/30/2017 7:49 AM by Dr. Daryl Rosas MD.         Echo EF 40 %    Chest x-ray from 11/2 showed improvement    No change in cultures, all blood cultures negative      Assessment/Plan     Active Problems:  NSTEMI (non-ST elevated myocardial infarction), being medically managed, . Patient is on Plavix, beta blocker, and high-dose statin.  She remains asymptomatic    Systolic congestive heart failure, ejection fraction 40%, on ARB, she remains on by mouth Lasix.  Will recheck electrolytes as well as BNP and chest x-ray in the a.m.  Overall she appears compensated.    Pneumonia, now on oral antibiotics.  She has some wheezing today so I'm holding on further weaning of her steroids.    UTI, Klebsiella, Omnicef should cover this as well    Severe Sepsis secondary to pneumonia and UTI, resolved    Acute hypoxic respiratory failure secondary to*congestive heart failure in combination with pneumonia stable on 2 L      Paroxysmal atrial fibrillation, now in sinus rhythm, she is being maintained on amiodarone therapy.  She's currently on this 3 times a day, will leave decision to change to twice a day if needed to cardiology.  She continues on xarelto for stroke prevention    Functional decline , therapy as available, strength appears to be improving.    Baseline dementia, stable and pleasant    Diabetes some wide fluctuations but in trying to prevent hypoglycemia, I'm holding on further insulin adjustments.    Awaiting placement a .    Tong Carrera MD  11/05/17  10:43 AM

## 2017-11-06 ENCOUNTER — APPOINTMENT (OUTPATIENT)
Dept: GENERAL RADIOLOGY | Facility: HOSPITAL | Age: 82
End: 2017-11-06

## 2017-11-06 VITALS
BODY MASS INDEX: 30.37 KG/M2 | RESPIRATION RATE: 18 BRPM | HEIGHT: 63 IN | TEMPERATURE: 98.3 F | SYSTOLIC BLOOD PRESSURE: 125 MMHG | DIASTOLIC BLOOD PRESSURE: 85 MMHG | HEART RATE: 65 BPM | OXYGEN SATURATION: 95 % | WEIGHT: 171.4 LBS

## 2017-11-06 PROBLEM — A41.9 SEPSIS DUE TO PNEUMONIA (HCC): Status: RESOLVED | Noted: 2017-10-26 | Resolved: 2017-11-06

## 2017-11-06 PROBLEM — N39.0 SEPSIS SECONDARY TO UTI (HCC): Status: RESOLVED | Noted: 2017-10-26 | Resolved: 2017-11-06

## 2017-11-06 PROBLEM — A41.9 SEPSIS SECONDARY TO UTI (HCC): Status: RESOLVED | Noted: 2017-10-26 | Resolved: 2017-11-06

## 2017-11-06 PROBLEM — J96.01 ACUTE RESPIRATORY FAILURE WITH HYPOXIA (HCC): Status: RESOLVED | Noted: 2017-10-26 | Resolved: 2017-11-06

## 2017-11-06 PROBLEM — J18.9 SEPSIS DUE TO PNEUMONIA (HCC): Status: RESOLVED | Noted: 2017-10-26 | Resolved: 2017-11-06

## 2017-11-06 LAB
027 TOXIN: NORMAL
ANION GAP SERPL CALCULATED.3IONS-SCNC: 5.6 MMOL/L (ref 3.6–11.2)
BASOPHILS # BLD AUTO: 0.01 10*3/MM3 (ref 0–0.3)
BASOPHILS NFR BLD AUTO: 0.1 % (ref 0–2)
BNP SERPL-MCNC: 237 PG/ML (ref 0–100)
BUN BLD-MCNC: 20 MG/DL (ref 7–21)
BUN/CREAT SERPL: 24.1 (ref 7–25)
C DIFF TOX GENS STL QL NAA+PROBE: NEGATIVE
CALCIUM SPEC-SCNC: 8.9 MG/DL (ref 7.7–10)
CHLORIDE SERPL-SCNC: 103 MMOL/L (ref 99–112)
CO2 SERPL-SCNC: 31.4 MMOL/L (ref 24.3–31.9)
CREAT BLD-MCNC: 0.83 MG/DL (ref 0.43–1.29)
CRP SERPL-MCNC: <0.5 MG/DL (ref 0–0.99)
DEPRECATED RDW RBC AUTO: 56.8 FL (ref 37–54)
EOSINOPHIL # BLD AUTO: 0.11 10*3/MM3 (ref 0–0.7)
EOSINOPHIL NFR BLD AUTO: 0.9 % (ref 0–7)
ERYTHROCYTE [DISTWIDTH] IN BLOOD BY AUTOMATED COUNT: 17.8 % (ref 11.5–14.5)
GFR SERPL CREATININE-BSD FRML MDRD: 66 ML/MIN/1.73
GLUCOSE BLD-MCNC: 136 MG/DL (ref 70–110)
GLUCOSE BLDC GLUCOMTR-MCNC: 234 MG/DL (ref 70–130)
GLUCOSE BLDC GLUCOMTR-MCNC: 241 MG/DL (ref 70–130)
HCT VFR BLD AUTO: 40.2 % (ref 37–47)
HGB BLD-MCNC: 12.4 G/DL (ref 12–16)
IMM GRANULOCYTES # BLD: 0.06 10*3/MM3 (ref 0–0.03)
IMM GRANULOCYTES NFR BLD: 0.5 % (ref 0–0.5)
LYMPHOCYTES # BLD AUTO: 2.17 10*3/MM3 (ref 1–3)
LYMPHOCYTES NFR BLD AUTO: 18.7 % (ref 16–46)
MCH RBC QN AUTO: 27.1 PG (ref 27–33)
MCHC RBC AUTO-ENTMCNC: 30.8 G/DL (ref 33–37)
MCV RBC AUTO: 87.8 FL (ref 80–94)
MONOCYTES # BLD AUTO: 1.34 10*3/MM3 (ref 0.1–0.9)
MONOCYTES NFR BLD AUTO: 11.6 % (ref 0–12)
NEUTROPHILS # BLD AUTO: 7.89 10*3/MM3 (ref 1.4–6.5)
NEUTROPHILS NFR BLD AUTO: 68.2 % (ref 40–75)
OSMOLALITY SERPL CALC.SUM OF ELEC: 284.1 MOSM/KG (ref 273–305)
PLATELET # BLD AUTO: 301 10*3/MM3 (ref 130–400)
PMV BLD AUTO: 10.9 FL (ref 6–10)
POTASSIUM BLD-SCNC: 3.9 MMOL/L (ref 3.5–5.3)
RBC # BLD AUTO: 4.58 10*6/MM3 (ref 4.2–5.4)
SODIUM BLD-SCNC: 140 MMOL/L (ref 135–153)
WBC NRBC COR # BLD: 11.58 10*3/MM3 (ref 4.5–12.5)

## 2017-11-06 PROCEDURE — 99232 SBSQ HOSP IP/OBS MODERATE 35: CPT | Performed by: INTERNAL MEDICINE

## 2017-11-06 PROCEDURE — 97116 GAIT TRAINING THERAPY: CPT

## 2017-11-06 PROCEDURE — 85025 COMPLETE CBC W/AUTO DIFF WBC: CPT | Performed by: INTERNAL MEDICINE

## 2017-11-06 PROCEDURE — 94799 UNLISTED PULMONARY SVC/PX: CPT

## 2017-11-06 PROCEDURE — 83880 ASSAY OF NATRIURETIC PEPTIDE: CPT | Performed by: INTERNAL MEDICINE

## 2017-11-06 PROCEDURE — 80048 BASIC METABOLIC PNL TOTAL CA: CPT | Performed by: INTERNAL MEDICINE

## 2017-11-06 PROCEDURE — 63710000001 PREDNISONE PER 5 MG: Performed by: INTERNAL MEDICINE

## 2017-11-06 PROCEDURE — 82962 GLUCOSE BLOOD TEST: CPT

## 2017-11-06 PROCEDURE — 63710000001 INSULIN ASPART PER 5 UNITS: Performed by: INTERNAL MEDICINE

## 2017-11-06 PROCEDURE — 99239 HOSP IP/OBS DSCHRG MGMT >30: CPT | Performed by: INTERNAL MEDICINE

## 2017-11-06 PROCEDURE — 97530 THERAPEUTIC ACTIVITIES: CPT

## 2017-11-06 PROCEDURE — 87493 C DIFF AMPLIFIED PROBE: CPT | Performed by: PHYSICIAN ASSISTANT

## 2017-11-06 PROCEDURE — 71010 HC CHEST AP: CPT

## 2017-11-06 PROCEDURE — 97110 THERAPEUTIC EXERCISES: CPT

## 2017-11-06 PROCEDURE — 86140 C-REACTIVE PROTEIN: CPT | Performed by: INTERNAL MEDICINE

## 2017-11-06 PROCEDURE — 71010 XR CHEST AP: CPT | Performed by: RADIOLOGY

## 2017-11-06 RX ORDER — AMIODARONE HYDROCHLORIDE 200 MG/1
200 TABLET ORAL 3 TIMES DAILY
Start: 2017-11-06 | End: 2018-07-17 | Stop reason: ALTCHOICE

## 2017-11-06 RX ORDER — ATORVASTATIN CALCIUM 80 MG/1
80 TABLET, FILM COATED ORAL NIGHTLY
Status: ON HOLD
Start: 2017-11-06 | End: 2019-02-06

## 2017-11-06 RX ORDER — ACETAMINOPHEN 325 MG/1
650 TABLET ORAL EVERY 4 HOURS PRN
Start: 2017-11-06

## 2017-11-06 RX ORDER — PREDNISONE 10 MG/1
10 TABLET ORAL
Start: 2017-11-07 | End: 2017-11-10

## 2017-11-06 RX ORDER — CARVEDILOL 6.25 MG/1
6.25 TABLET ORAL 2 TIMES DAILY WITH MEALS
Start: 2017-11-06 | End: 2019-02-09 | Stop reason: HOSPADM

## 2017-11-06 RX ORDER — LOPERAMIDE HYDROCHLORIDE 2 MG/1
2 CAPSULE ORAL 3 TIMES DAILY PRN
Status: ON HOLD
Start: 2017-11-06 | End: 2019-02-06

## 2017-11-06 RX ORDER — CLOPIDOGREL BISULFATE 75 MG/1
75 TABLET ORAL DAILY
Qty: 30 TABLET
Start: 2017-11-07 | End: 2019-02-18 | Stop reason: SINTOL

## 2017-11-06 RX ORDER — FUROSEMIDE 20 MG/1
20 TABLET ORAL DAILY
Start: 2017-11-07 | End: 2017-11-27 | Stop reason: SDUPTHER

## 2017-11-06 RX ORDER — VALSARTAN 80 MG/1
80 TABLET ORAL
Status: ON HOLD
Start: 2017-11-07 | End: 2019-02-06

## 2017-11-06 RX ORDER — CEFDINIR 300 MG/1
300 CAPSULE ORAL EVERY 12 HOURS SCHEDULED
Qty: 7 CAPSULE | Refills: 0 | Status: SHIPPED | OUTPATIENT
Start: 2017-11-06 | End: 2017-11-10

## 2017-11-06 RX ORDER — METRONIDAZOLE 500 MG/1
500 TABLET ORAL EVERY 8 HOURS SCHEDULED
Qty: 12 TABLET | Refills: 0 | Status: SHIPPED | OUTPATIENT
Start: 2017-11-06 | End: 2017-11-10

## 2017-11-06 RX ORDER — IPRATROPIUM BROMIDE AND ALBUTEROL SULFATE 2.5; .5 MG/3ML; MG/3ML
3 SOLUTION RESPIRATORY (INHALATION) EVERY 4 HOURS PRN
Qty: 360 ML | Status: ON HOLD
Start: 2017-11-06 | End: 2019-02-06

## 2017-11-06 RX ADMIN — RIVASTIGMINE 1 PATCH: 9.5 PATCH, EXTENDED RELEASE TRANSDERMAL at 09:37

## 2017-11-06 RX ADMIN — INSULIN ASPART 10 UNITS: 100 INJECTION, SOLUTION INTRAVENOUS; SUBCUTANEOUS at 12:11

## 2017-11-06 RX ADMIN — AMIODARONE HYDROCHLORIDE 200 MG: 200 TABLET ORAL at 09:03

## 2017-11-06 RX ADMIN — VALSARTAN 80 MG: 80 TABLET ORAL at 09:03

## 2017-11-06 RX ADMIN — CARVEDILOL 6.25 MG: 6.25 TABLET, FILM COATED ORAL at 09:03

## 2017-11-06 RX ADMIN — INSULIN ASPART 4 UNITS: 100 INJECTION, SOLUTION INTRAVENOUS; SUBCUTANEOUS at 09:36

## 2017-11-06 RX ADMIN — CEFDINIR 300 MG: 300 CAPSULE ORAL at 09:02

## 2017-11-06 RX ADMIN — FERROUS SULFATE TAB 325 MG (65 MG ELEMENTAL FE) 325 MG: 325 (65 FE) TAB at 09:02

## 2017-11-06 RX ADMIN — INSULIN ASPART 10 UNITS: 100 INJECTION, SOLUTION INTRAVENOUS; SUBCUTANEOUS at 09:36

## 2017-11-06 RX ADMIN — FUROSEMIDE 20 MG: 20 TABLET ORAL at 09:03

## 2017-11-06 RX ADMIN — PANTOPRAZOLE SODIUM 40 MG: 40 TABLET, DELAYED RELEASE ORAL at 03:09

## 2017-11-06 RX ADMIN — METRONIDAZOLE 500 MG: 250 TABLET ORAL at 03:09

## 2017-11-06 RX ADMIN — GUAIFENESIN 200 MG: 100 SOLUTION ORAL at 12:21

## 2017-11-06 RX ADMIN — CETIRIZINE HYDROCHLORIDE 5 MG: 10 TABLET ORAL at 09:02

## 2017-11-06 RX ADMIN — HYDROCORTISONE 2.5%: 25 CREAM TOPICAL at 09:37

## 2017-11-06 RX ADMIN — IPRATROPIUM BROMIDE 0.5 MG: 0.5 SOLUTION RESPIRATORY (INHALATION) at 06:31

## 2017-11-06 RX ADMIN — CLOPIDOGREL 75 MG: 75 TABLET, FILM COATED ORAL at 09:02

## 2017-11-06 RX ADMIN — Medication 1 CAPSULE: at 09:02

## 2017-11-06 RX ADMIN — INSULIN ASPART 4 UNITS: 100 INJECTION, SOLUTION INTRAVENOUS; SUBCUTANEOUS at 12:11

## 2017-11-06 RX ADMIN — PREDNISONE 10 MG: 10 TABLET ORAL at 09:37

## 2017-11-06 NOTE — DISCHARGE SUMMARY
[facility] HOSPITALIST MEDICINE DISCHARGE SUMMARY    Patient Identification:  Name:  Floridalma Bryant  Age:  83 y.o.  Sex:  female  :  1934  MRN:  [id number]  Visit Number:  [id number]    Date of Admission: 10/26/2017  Date of Discharge:  2017     PCP: LILY Christensen    DISCHARGE DIAGNOSIS  · Non-ST elevation MI  · Chronic systolic congestive heart failure, EF 40%, compensated  · Pneumonia  · Acute urinary tract infection due to Klebsiella  · Severe sepsis secondary to pneumonia and UTI, resolved  · Acute hypoxemic respiratory failure secondary to pneumonia and CHF exacerbation, improved.  · Paroxysmal atrial fibrillation  · Diabetes mellitus  · Functional quadriparesis  · Dementia      CONSULTS   Cardiology  Pulmonology    PROCEDURES PERFORMED  10/26/17: 2-D echocardiogram that showed borderline dilated LV cavity, mildly decreased systolic function with EF 40%, mild to moderate TR and severe hypokinesis in LAD territory.  10/26/17: CT scan of the abdomen and pelvis that showed a small hiatal hernia and cholelithiasis.   10/26/17: CT scan of the chest that showed multiple bilateral pulmonary nodules.    REASON FOR ADMISSION  Patient is a 83 y.o. female presented to [facility] complaining of vomiting.  Please see the admitting history and physical for further details.       HOSPITAL COURSE   83 y.o. female with past medical history significant for type II diabetes mellitus and essential hypertension and advanced dementia presented to the [facility] emergency department for evaluation of abdominal pain, nausea and vomiting.  She was found to have bilateral community-acquired pneumonia, urinary tract infection, acute hypoxemic respiratory failure and elevated troponin and was admitted to critical care unit for further treatment.      Patient had elevated troponins and cardiac markers on admission.  The patient has dementia and on admission patient was not able to  give a good history.  She was seen by cardiology and in discussion with the family, cardiologist and the family decided to treat her myocardial infarction conservatively.  Her 2-D echocardiogram showed significant hypokinesis of the anteroapical territory consistent with an acute LAD MI.  Patient was continued on aspirin, Plavix, atorvastatin.  She was started on Coreg and anticoagulation with Lovenox.  Her Lovenox was later changed to prophylactic dose.  Patient remained chest pain-free.  She can also was found to have paroxysmal atrial fibrillation and was started Cardizem drip and later on amiodarone was added to control her heart rate.  She was also put on anticoagulation with Lovenox initially and later it was changed to Xarelto.  Patient had control of her heart rate and her amiodarone was switched to by mouth.  Cardizem drip was discontinued.  Dose of beta blocker was increased and patient continued to have controlled heart rate.  Her amiodarone dose will be decreased after 3 weeks of bloating to 200 twice a day.    Patient met criteria for sepsis on admission, secondary to bilateral pneumonia and UTI.  She was started on broad-spectrum antibiotics and blood cultures, respiratory cultures, urine cultures and serology for atypical organism causing pneumonia was ordered.  Patient's urine culture grew Klebsiella, no growth on blood cultures and respiratory cultures was found.  Workup for influenza and atypical etiologies was negative.  She had improvement in her sepsis as well as pneumonia and UTI.  Her antibiotic was switched to by mouth as her inflammatory markers normalize and patient remained afebrile.    Patient had acute hypoxemic respiratory failure secondary to CHF exacerbation and pneumonia.  He was put on supplemental oxygen by nasal cannula and was able to maintain her saturation.  Patient did not require any ventilatory support.  Her oxygen was gradually titrated down as her underlying etiologies  were treated and her hypoxemia improved.    Patient had evidence of CHF exacerbation on admission with elevated BNP, and clinical signs of fluid overload.  She was started on diuretics and beta blocker and ARB.  She diuresed well with Lasix and had improvement in her BNP.  Her BNP has come down to 237 from 800 and clinically patient is feeling much better.    Patient was found to have pulmonary nodules on her CT scan of the chest.  She was seen by Dr. Nash in pulmonology consultation and advised close follow-up as outpatient.    Patient had significant generalized weakness secondary to above.  She was seen by physical therapy and occupational therapy and advised continued inpatient rehabilitation.   were consulted and patient was accepted at [facility] for continued rehabilitation.  She is being discharged to [facility] rehabilitation.  She had developed diarrhea one day prior to discharge which has improved on the day of discharge.  Patient has been afebrile and denies any nausea vomiting or abdominal pain.  Her stool for C. difficile was negative.  She is tolerating by mouth diet.        DISCHARGE DISPOSITION   Stable    DISCHARGE MEDICATIONS:   Floridalma Bryant   Home Medication Instructions LATANYA:[id number]    Printed on:11/06/17 1253   Medication Information                      acetaminophen (TYLENOL) 325 MG tablet  Take 2 tablets by mouth Every 4 (Four) Hours As Needed for Headache or Fever (temperature greater than 101.5 F).             amiodarone (PACERONE) 200 MG tablet  Take 1 tablet by mouth 3 (Three) Times a Day. Continue till 11/11/17 then change to BID             atorvastatin (LIPITOR) 80 MG tablet  Take 1 tablet by mouth Every Night.             carvedilol (COREG) 6.25 MG tablet  Take 1 tablet by mouth 2 (Two) Times a Day With Meals.             cefdinir (OMNICEF) 300 MG capsule  Take 1 capsule by mouth Every 12 (Twelve) Hours for 7 doses. Indications: Pneumonia              clopidogrel (PLAVIX) 75 MG tablet  Take 1 tablet by mouth Daily.             ferrous sulfate 325 (65 FE) MG tablet  Take 325 mg by mouth Daily With Breakfast.             Fexofenadine HCl (ALLEGRA PO)  Take 180 mg by mouth 2 (Two) Times a Day.             furosemide (LASIX) 20 MG tablet  Take 1 tablet by mouth Daily.             guaiFENesin (ROBITUSSIN) 100 MG/5ML solution oral solution  Take 10 mL by mouth Every 6 (Six) Hours As Needed (cough and congestion).             insulin aspart (novoLOG) 100 UNIT/ML injection  Inject 10 Units under the skin 3 (Three) Times a Day With Meals.             insulin detemir (LEVEMIR) 100 UNIT/ML injection  Inject 25 Units under the skin Every Night.             ipratropium-albuterol (DUO-NEB) 0.5-2.5 mg/mL nebulizer  Take 3 mL by nebulization Every 4 (Four) Hours As Needed for Shortness of Air.             linagliptin (TRADJENTA) 5 MG tablet tablet  Take 5 mg by mouth Daily.             loperamide (IMODIUM) 2 MG capsule  Take 1 capsule by mouth 3 (Three) Times a Day As Needed for Diarrhea.             metroNIDAZOLE (FLAGYL) 500 MG tablet  Take 1 tablet by mouth Every 8 (Eight) Hours for 12 doses. Indications: Pneumonia             omeprazole (priLOSEC) 20 MG capsule  Take 20 mg by mouth Daily.             predniSONE (DELTASONE) 10 MG tablet  Take 1 tablet by mouth Daily With Breakfast for 3 days.             rivaroxaban (XARELTO) 15 MG tablet  Take 1 tablet by mouth Daily With Dinner.             rivastigmine (EXELON) 9.5 MG/24HR patch  Place 1 patch on the skin Daily.             valsartan (DIOVAN) 80 MG tablet  Take 1 tablet by mouth Daily.             vitamin D (ERGOCALCIFEROL) 63937 units capsule capsule  Take 50,000 Units by mouth 1 (One) Time Per Week.                 Diet Instructions     Diet: Soft Texture; Thin Liquids, No Restrictions; Ground       Discharge Diet:  Soft Texture   Fluid Consistency:  Thin Liquids, No Restrictions   Soft Options:  Ground                  Activity Instructions     Activity as Tolerated                   Additional Instructions for the Follow-ups that You Need to Schedule     Additional Discharge Follow-Up (Specify Provider)    As directed    To:  cardiology   Follow Up:  2 Weeks       Additional Discharge Follow-Up (Specify Provider)    As directed    To:  pulmonology   Follow Up:  2 Weeks       Discharge Follow-up with PCP    As directed    Follow Up Details:  1 week.             Follow-up Information     Follow up with LILY Christensen .    Specialty:  Certified Clinical Nurse Specialist    Why:  1 week.    Contact information:    [address] [place] [address] [phone]            TEST  RESULTS PENDING AT DISCHARGE       Denise Estrada MD  11/06/17  12:56 PM    Please note that this discharge summary required more than 30 minutes to complete.    Please send a copy of this dictation to the following providers:    LILY Christensen

## 2017-11-06 NOTE — THERAPY TREATMENT NOTE
Acute Care - Physical Therapy Treatment Note   [place]     Patient Name: Floridalma Bryant  : 1934  MRN: [id number]  Today's Date: 2017  Onset of Illness/Injury or Date of Surgery Date: 10/26/17  Date of Referral to PT: 10/30/17  Referring Physician: Debi    Admit Date: 10/26/2017    Visit Dx:    ICD-10-CM ICD-9-CM   1. NSTEMI (non-ST elevated myocardial infarction) I21.4 410.70   2. Acute cystitis without hematuria N30.00 595.0     Patient Active Problem List   Diagnosis   • NSTEMI (non-ST elevated myocardial infarction)   • Essential hypertension   • Type II diabetes mellitus   • Dementia   • Pulmonary nodules, bilateral   • RBBB   • Chronic right basal ganglia lacunar infarct on previous head CT   • Advanced age   • Hiatal hernia   • GERD (gastroesophageal reflux disease)               Adult Rehabilitation Note       17 1323 17 1520       Rehab Assessment/Intervention    Discipline physical therapist  -AG physical therapist  -BC     Document Type therapy note (daily note)  -AG therapy note (daily note)  -BC     Subjective Information agree to therapy;complains of;fatigue;dyspnea  -AG agree to therapy  -BC     Patient Effort, Rehab Treatment good  -AG adequate  -BC     Symptoms Noted During/After Treatment shortness of breath  -AG fatigue  -BC     Precautions/Limitations fall precautions;oxygen therapy device and L/min  -AG      Patient Response to Treatment pt. responded well to treatment; incr ambulation distance noted without O2 desaturation  -AG      Recorded by [AG] Radha Redman, PT [BC] Alberta Bird, PT     Vital Signs    Intratreatment Heart Rate (beats/min) 70  -AG      Intra SpO2 (%) 98  -AG      O2 Delivery Intra Treatment supplemental O2  -AG      Recorded by [AG] Radha Redman, PT      Pain Assessment    Pain Assessment No/denies pain  -AG Fox-Baker FACES  -BC     Fox-Baker FACES Pain Rating  4  -BC     Pain Location  Generalized  -BC     Recorded by [AG] Radha Redman,  PT [BC] Alberta Bird, PT     Vision Assessment/Intervention    Visual Impairment WFL  -AG      Recorded by [AG] Radha Redman, PT      Cognitive Assessment/Intervention    Current Cognitive/Communication Assessment functional  -AG functional  -BC     Orientation Status  oriented to;person  -BC     Follows Commands/Answers Questions 100% of the time;able to follow single-step instructions;needs cueing;needs repetition  -AG      Personal Safety decreased awareness, need for assist;decreased awareness, need for safety  -AG      Personal Safety Interventions fall prevention program maintained;gait belt;nonskid shoes/slippers when out of bed;supervised activity  -AG      Recorded by [AG] Radha Redman, PT [BC] Alberta Bird, PT     Bed Mobility, Assessment/Treatment    Bed Mobility, Assistive Device bed rails;draw sheet  -AG      Bed Mobility, Scoot/Bridge, [place] minimum assist (75% patient effort)  -AG      Bed Mob, Supine to Sit, [place] minimum assist (75% patient effort)  -AG      Bed Mobility, Safety Issues decreased use of arms for pushing/pulling;decreased use of legs for bridging/pushing  -AG      Bed Mobility, Impairments strength decreased  -AG      Recorded by [AG] Radha Redman, PT      Transfer Assessment/Treatment    Transfers, Bed-Chair [place] verbal cues required;nonverbal cues required (demo/gesture);minimum assist (75% patient effort)  -AG      Transfers, Chair-Bed [place] verbal cues required;nonverbal cues required (demo/gesture);minimum assist (75% patient effort)  -AG      Transfers, Bed-Chair-Bed, Assist Device rolling walker  -AG      Transfers, Sit-Stand [place] verbal cues required;nonverbal cues required (demo/gesture);minimum assist (75% patient effort)  -AG minimum assist (75% patient effort)  -BC     Transfers, Stand-Sit [place] verbal cues required;nonverbal cues required (demo/gesture);minimum assist (75% patient effort)  -AG minimum assist (75%  patient effort)  -BC     Transfers, Sit-Stand-Sit, Assist Device rolling walker  -AG rolling walker  -BC     Transfer, Impairments strength decreased;impaired balance  -AG strength decreased  -BC     Recorded by [AG] Radha Redman, PT [BC] Alberta Bird PT     Gait Assessment/Treatment    Gait, [place] Level verbal cues required;nonverbal cues required (demo/gesture);minimum assist (75% patient effort);1 person + 1 person to manage equipment  -AG 1 person + 1 person to manage equipment;verbal cues required;nonverbal cues required (demo/gesture);minimum assist (75% patient effort)  -BC     Gait, Assistive Device rolling walker  -AG rolling walker  -BC     Gait, Distance (Feet) --   50 x 2 (required seated rest break).  -AG 40  -BC     Gait, Gait Pattern Analysis swing-to gait  -AG swing-to gait  -BC     Gait, Gait Deviations marium decreased;decreased heel strike  -AG      Gait, Safety Issues balance decreased during turns;step length decreased;supplemental O2  -AG      Gait, Impairments strength decreased;impaired balance  -AG strength decreased  -BC     Recorded by [AG] Radha Redman, PT [BC] Alberta Bird PT     Motor Skills/Interventions    Additional Documentation Balance Skills Training (Group)  -AG      Recorded by [AG] Radha Redman PT      Balance Skills Training    Sitting-Level of Assistance Distant supervision  -AG      Sitting-Balance Support Feet supported  -AG      Standing-Level of Assistance Contact guard;Minimum assistance  -AG Minimum assistance  -BC     Static Standing Balance Support assistive device  -AG assistive device  -BC     Standing-Balance Activities  Weight Shift A-P;Weight Shift R-L  -BC     Gait Balance-Level of Assistance Minimum assistance  -AG      Gait Balance Support assistive device  -AG      Recorded by [AG] Radha Redman, PT [BC] Alberta Bird PT     Therapy Exercises    Bilateral Lower Extremities AROM:;10 reps;sitting;ankle pumps/circles;hip flexion;LAQ   -AG AROM:;10 reps;sitting  -BC     Recorded by [AG] Radha Redman, PT [BC] Alberta Bird, PT     Positioning and Restraints    Pre-Treatment Position in bed  -AG in bed  -BC     Post Treatment Position chair  -AG chair  -BC     In Chair notified nsg;sitting;call light within reach;encouraged to call for assist;with family/caregiver  -AG notified nsg;sitting;call light within reach;encouraged to call for assist;with family/caregiver  -BC     Recorded by [AG] Radha Redman, PT [BC] Alberta Bird, PT       User Key  (r) = Recorded By, (t) = Taken By, (c) = Cosigned By    Initials Name Effective Dates    BC Alberta Bird, PT 03/14/16 -     AG Radha Redman, PT 03/14/16 -                 IP PT Goals       10/30/17 1744          Transfer Training PT LTG    Transfer Training PT LTG, Date Established 10/30/17  -CT      Transfer Training PT LTG, Time to Achieve by discharge  -CT      Transfer Training PT LTG, Activity Type bed to chair /chair to bed;sit to stand/stand to sit  -CT      Transfer Training PT LTG, [place] Level contact guard assist;minimum assist (75% patient effort)  -CT      Transfer Training PT LTG, Assist Device other (see comments)   with appropriate AD  -CT      Gait Training PT LTG    Gait Training Goal PT LTG, Date Established 10/30/17  -CT      Gait Training Goal PT LTG, Time to Achieve by discharge  -CT      Gait Training Goal PT LTG, [place] Level minimum assist (75% patient effort);1 person + 1 person to manage equipment  -CT      Gait Training Goal PT LTG, Assist Device other (see comments)   with appropriate AD  -CT      Gait Training Goal PT LTG, Distance to Achieve 200  -CT        User Key  (r) = Recorded By, (t) = Taken By, (c) = Cosigned By    Initials Name Provider Type    CT Zara Israel PT Physical Therapist          Physical Therapy Education     Title: PT OT SLP Therapies (Done)     Topic: Physical Therapy (Done)     Point: Mobility training (Done)    Learning  Progress Summary    Learner Readiness Method Response Comment Documented by Status   Patient Acceptance E,D NR,VU  AG 11/06/17 1329 Done    Acceptance E NR  SN 11/05/17 1932 Active    Acceptance E VU  KS 11/05/17 1149 Done    Acceptance E VU  KS 11/04/17 1425 Done    Acceptance E NL  RR 11/03/17 2358 Active    Acceptance E VU  BC 11/03/17 1522 Done    Acceptance E VU,NR  RF 11/02/17 1010 Done    Acceptance E NL,NR  KF 11/02/17 0216 Active    Acceptance E NR  CT 11/01/17 1020 Active    Acceptance E NR,NL  KF 11/01/17 0037 Active    Acceptance E VU,NR  CT 10/31/17 1048 Done    Acceptance E NR  KF 10/31/17 0337 Active    Acceptance E NR  CT 10/30/17 1745 Active   Family Acceptance E,D NR,VU  AG 11/06/17 1329 Done    Acceptance E NL  RR 11/03/17 2358 Active    Acceptance E VU,NR  RF 11/02/17 1010 Done    Acceptance E VU  KF 11/01/17 0039 Done    Acceptance E VU  KF 10/31/17 0339 Done               Point: Home exercise program (Done)    Learning Progress Summary    Learner Readiness Method Response Comment Documented by Status   Patient Acceptance E,D NR,VU  AG 11/06/17 1329 Done    Acceptance E NR   11/05/17 1932 Active    Acceptance E VU  KS 11/05/17 1149 Done    Acceptance E VU  KS 11/04/17 1425 Done    Acceptance E NL  RR 11/03/17 2358 Active    Acceptance E VU  BC 11/03/17 1522 Done    Acceptance E VU,NR  RF 11/02/17 1010 Done    Acceptance E NL,NR  KF 11/02/17 0216 Active    Acceptance E NR  CT 11/01/17 1020 Active    Acceptance E NR,NL  KF 11/01/17 0037 Active    Acceptance E VU,NR  CT 10/31/17 1048 Done    Acceptance E NR  KF 10/31/17 0337 Active    Acceptance E NR  CT 10/30/17 1745 Active   Family Acceptance E,D NR,VU  AG 11/06/17 1329 Done    Acceptance E NL  RR 11/03/17 2358 Active    Acceptance E VU,NR  RF 11/02/17 1010 Done    Acceptance E VU  KF 11/01/17 0039 Done    Acceptance E VU  KF 10/31/17 0339 Done               Point: Body mechanics (Done)    Learning Progress Summary    Learner Readiness Method  Response Comment Documented by Status   Patient Acceptance E,D NR,VU  AG 11/06/17 1329 Done    Acceptance E NR  SN 11/05/17 1932 Active    Acceptance E VU  KS 11/05/17 1149 Done    Acceptance E VU  KS 11/04/17 1425 Done    Acceptance E NL  RR 11/03/17 2358 Active    Acceptance E VU  BC 11/03/17 1522 Done    Acceptance E VU,NR  RF 11/02/17 1010 Done    Acceptance E NL,NR  KF 11/02/17 0216 Active    Acceptance E NR  CT 11/01/17 1020 Active    Acceptance E NR,NL  KF 11/01/17 0037 Active    Acceptance E VU,NR  CT 10/31/17 1048 Done    Acceptance E NR  KF 10/31/17 0337 Active    Acceptance E NR  CT 10/30/17 1745 Active   Family Acceptance E,D NR,VU  AG 11/06/17 1329 Done    Acceptance E NL  RR 11/03/17 2358 Active    Acceptance E VU,NR  RF 11/02/17 1010 Done    Acceptance E VU  KF 11/01/17 0039 Done    Acceptance E VU  KF 10/31/17 0339 Done               Point: Precautions (Done)    Learning Progress Summary    Learner Readiness Method Response Comment Documented by Status   Patient Acceptance E,D NR,VU  AG 11/06/17 1329 Done    Acceptance E NR  SN 11/05/17 1932 Active    Acceptance E VU  KS 11/05/17 1149 Done    Acceptance E VU  KS 11/04/17 1425 Done    Acceptance E NL  RR 11/03/17 2358 Active    Acceptance E VU  BC 11/03/17 1522 Done    Acceptance E VU,NR  RF 11/02/17 1010 Done    Acceptance E NL,NR  KF 11/02/17 0216 Active    Acceptance E NR  CT 11/01/17 1020 Active    Acceptance E NR,NL  KF 11/01/17 0037 Active    Acceptance E VU,NR  CT 10/31/17 1048 Done    Acceptance E NR  KF 10/31/17 0337 Active    Acceptance E NR  CT 10/30/17 1745 Active   Family Acceptance E,D NR,VU  AG 11/06/17 1329 Done    Acceptance E NL  RR 11/03/17 2358 Active    Acceptance E VU,NR  RF 11/02/17 1010 Done    Acceptance E VU  KF 11/01/17 0039 Done    Acceptance E VU  KF 10/31/17 0339 Done                      User Key     Initials Effective Dates Name Provider Type Discipline    RR 11/02/16 -  Ary Mars, RN Registered Nurse Nurse    SN  11/08/16 -  Anushka Domingo, RN Registered Nurse Nurse    KS 06/16/16 -  Kamla Gama, RN Registered Nurse Nurse    BC 03/14/16 -  Alberta Bird, PT Physical Therapist PT    AG 03/14/16 -  Radha Redman, PT Physical Therapist PT    CT 03/14/16 -  Zara Israel, PT Physical Therapist PT    RF 07/19/17 -  Shanti Rivera, VEL Physical Therapy Assistant PT    KF 07/19/17 -  Mercedes Ta, RN Registered Nurse Nurse                    PT Recommendation and Plan  Anticipated Equipment Needs At Discharge: front wheeled walker  Anticipated Discharge Disposition: home with assist, home with home health  Planned Therapy Interventions: balance training, bed mobility training, gait training, home exercise program, neuromuscular re-education, patient/family education, strengthening, transfer training, postural re-education  PT Frequency: 3-5 times/wk, per priority policy             Outcome Measures       11/03/17 1500          How much help from another person do you currently need...    Turning from your back to your side while in flat bed without using bedrails? 3  -BC      Moving from lying on back to sitting on the side of a flat bed without bedrails? 3  -BC      Moving to and from a bed to a chair (including a wheelchair)? 3  -BC      Standing up from a chair using your arms (e.g., wheelchair, bedside chair)? 3  -BC      Climbing 3-5 steps with a railing? 2  -BC      To walk in hospital room? 2  -BC      AM-PAC 6 Clicks Score 16  -BC      Functional Assessment    Outcome Measure Options AM-PAC 6 Clicks Basic Mobility (PT)  -BC        User Key  (r) = Recorded By, (t) = Taken By, (c) = Cosigned By    Initials Name Provider Type    BC Alberta Bird, PT Physical Therapist           Time Calculation:         PT Charges       11/06/17 1330          Time Calculation    PT Received On 11/06/17  -      PT - Next Appointment 11/07/17  -      PT Goal Re-Cert Due Date 11/13/17  -      Time Calculation-  PT    TCU Minutes- PT 38 min  -AG        User Key  (r) = Recorded By, (t) = Taken By, (c) = Cosigned By    Initials Name Provider Type     Radha Redman, PT Physical Therapist          Therapy Charges for Today     Code Description Service Date Service Provider Modifiers Qty    46708924207 HC PT THER SUPP EA 15 MIN 11/6/2017 Radha Redman, PT GP 2    64523509740 HC PT THERAPEUTIC ACT EA 15 MIN 11/6/2017 Radha Redman, PT GP 1    35133571951 HC PT THER PROC EA 15 MIN 11/6/2017 Radha Redman, PT GP 1    27557359080 HC GAIT TRAINING EA 15 MIN 11/6/2017 Radha Redman, PT GP 1          PT G-Codes  Outcome Measure Options: AM-PAC 6 Clicks Basic Mobility (PT)  Score: 15  Functional Limitation: Mobility: Walking and moving around  Mobility: Walking and Moving Around Current Status (): At least 40 percent but less than 60 percent impaired, limited or restricted  Mobility: Walking and Moving Around Goal Status (): At least 20 percent but less than 40 percent impaired, limited or restricted    Radha Redman PT  11/6/2017

## 2017-11-06 NOTE — PLAN OF CARE
Problem: Pressure Ulcer Risk (Alfred Scale) (Adult,Obstetrics,Pediatric)  Goal: Skin Integrity  Outcome: Ongoing (interventions implemented as appropriate)    Problem: Cardiac Output, Decreased (Adult)  Goal: Adequate Cardiac Output/Effective Tissue Perfusion  Outcome: Ongoing (interventions implemented as appropriate)

## 2017-11-06 NOTE — PROGRESS NOTES
Discharge Planning Assessment  DEQUAN Vasquez     Patient Name: Floridalma Bryant  MRN: [id number]  Today's Date: 11/6/2017    Admit Date: 10/26/2017          Discharge Needs Assessment     None            Discharge Plan       11/06/17 1012    Case Management/Social Work Plan    Plan Pt has a bed at [facility] in [facility] on this date per Cari with accepting Physician Dr. Granados.  Signature will require a Discharge Summary prior to discharge in order to admit to their  facility and prescription for any narcotics prescribed.  SS will follow.    Patient/Family In Agreement With Plan yes        Discharge Placement     No information found        Expected Discharge Date and Time     Expected Discharge Date Expected Discharge Time    Nov 6, 2017               Demographic Summary     None            Functional Status     None            Psychosocial     None            Abuse/Neglect     None            Legal     None            Substance Abuse     None            Patient Forms     None          Dhara Lew

## 2017-11-06 NOTE — DISCHARGE PLACEMENT REQUEST
"Floridalma Redmond (83 y.o. Female)     Date of Birth Social Security Number Address Home Phone MRN    [id number]  [address] [facility] [place] [address] [phone] [id number]    Nondenominational Marital Status          Buddhism        Admission Date Admission Type Admitting Provider Attending Provider Department, Room/Bed    10/26/17 Emergency Oculam, MD Natalie Melgar, Denise Mantilla MD [address], 3308/2S    Discharge Date Discharge Disposition Discharge Destination                      Attending Provider: Denise Estrada MD     Allergies:  Codeine, Metformin And Related    Isolation:  None   Infection:  None   Code Status:  FULL    Ht:  63\" (160 cm)   Wt:  171 lb 6.4 oz (77.7 kg)    Admission Cmt:  None   Principal Problem:  None                Active Insurance as of 10/26/2017     Primary Coverage     Payor Plan Insurance Group Employer/Plan Group    MEDICARE MEDICARE A & B      Payor Plan Address Payor Plan Phone Number Effective From Effective To    [address] [phone] 9/1/1999     [place] [address]       Subscriber Name Subscriber Birth Date Member ID       FLORIDALMA REDMOND 1934 [id number]           Secondary Coverage     Payor Plan Insurance Group Employer/Plan Group    [facility] [id number]     Payor Plan Address Payor Plan Phone Number Effective From Effective To    [address] [address]  1/1/2017     [place] [address]       Subscriber Name Subscriber Birth Date Member ID       ROQUE REDMOND 6/27/1936 [id number]                 Emergency Contacts      (Rel.) Home Phone Work Phone Mobile Phone    Jaz Loredo (Daughter) [phone] -- --            Prior to Admission Medications     Prescriptions Last Dose Informant Patient Reported? Taking?    citalopram (CeleXA) 20 MG tablet 10/25/2017 Child Yes Yes    Take 20 mg by mouth Daily.    ferrous sulfate 325 (65 FE) MG tablet 10/25/2017 Child Yes Yes    Take 325 mg by mouth Daily " With Breakfast.    Fexofenadine HCl (ALLEGRA PO) 10/25/2017 Child Yes Yes    Take 180 mg by mouth 2 (Two) Times a Day.    insulin glargine (LANTUS) 100 UNIT/ML injection 10/25/2017 Child Yes Yes    Inject 12 Units under the skin Daily.    linagliptin (TRADJENTA) 5 MG tablet tablet 10/25/2017 Child Yes Yes    Take 5 mg by mouth Daily.    nitrofurantoin (MACRODANTIN) 50 MG capsule 10/25/2017 Child Yes Yes    Take 50 mg by mouth Daily.    omeprazole (priLOSEC) 20 MG capsule 10/25/2017 Child Yes Yes    Take 20 mg by mouth Daily.    rivastigmine (EXELON) 9.5 MG/24HR patch 10/25/2017 Child Yes Yes    Place 1 patch on the skin Daily.    valsartan (DIOVAN) 320 MG tablet 10/25/2017 Child Yes Yes    Take 320 mg by mouth Daily.    vitamin D (ERGOCALCIFEROL) 20457 units capsule capsule 10/24/2017 Child Yes Yes    Take 50,000 Units by mouth 1 (One) Time Per Week.          Hospital Medications (active)       Dose Frequency Start End    acetaminophen (TYLENOL) tablet 650 mg 650 mg Every 4 Hours PRN 10/26/2017     Sig - Route: Take 2 tablets by mouth Every 4 (Four) Hours As Needed for Headache or Fever (temperature greater than 101.5 F). - Oral    amiodarone (PACERONE) tablet 200 mg 200 mg 3 Times Daily 10/29/2017     Sig - Route: Take 1 tablet by mouth 3 (Three) Times a Day. - Oral    atorvastatin (LIPITOR) tablet 80 mg 80 mg Nightly 10/26/2017     Sig - Route: Take 2 tablets by mouth Every Night. - Oral    carvedilol (COREG) tablet 6.25 mg 6.25 mg 2 Times Daily With Meals 10/29/2017     Sig - Route: Take 1 tablet by mouth 2 (Two) Times a Day With Meals. - Oral    cefdinir (OMNICEF) capsule 300 mg 300 mg Every 12 Hours Scheduled 11/3/2017 11/10/2017    Sig - Route: Take 1 capsule by mouth Every 12 (Twelve) Hours. - Oral    cetirizine (zyrTEC) tablet 5 mg 5 mg Daily 10/26/2017     Sig - Route: Take 0.5 tablets by mouth Daily. - Oral    Cosign for Ordering: Accepted by Zaira Oliveros MD on 10/26/2017 11:24 AM    cholecalciferol  (VITAMIN D3) capsule 50,000 Units 50,000 Units Weekly 10/31/2017     Sig - Route: Take 1 capsule by mouth 1 (One) Time Per Week. - Oral    Cosign for Ordering: Accepted by Zaira Oliveros MD on 10/26/2017 11:24 AM    clopidogrel (PLAVIX) tablet 75 mg 75 mg Daily 10/27/2017     Sig - Route: Take 1 tablet by mouth Daily. - Oral    Cosign for Ordering: Accepted by Zaira Oliveros MD on 10/26/2017 11:24 AM    dextrose (D50W) solution 25 g 25 g Every 15 Minutes PRN 10/26/2017     Sig - Route: Infuse 50 mL into a venous catheter Every 15 (Fifteen) Minutes As Needed for Low Blood Sugar (Blood Sugar Less Than 70, Patient Has IV Access - Unresponsive, NPO or Unable To Safely Swallow). - Intravenous    Cosign for Ordering: Accepted by Denise Estrada MD on 10/26/2017  7:18 PM    dextrose (GLUTOSE) oral gel 15 g 15 g Every 15 Minutes PRN 10/26/2017     Sig - Route: Take 15 g by mouth Every 15 (Fifteen) Minutes As Needed for Low Blood Sugar (Blood Sugar Less Than 70, Patient Alert, Is Not NPO & Can Safely Swallow). - Oral    Cosign for Ordering: Accepted by Denise Estrada MD on 10/26/2017  7:18 PM    ferrous sulfate tablet 325 mg 325 mg Daily With Breakfast 10/26/2017     Sig - Route: Take 1 tablet by mouth Daily With Breakfast. - Oral    Cosign for Ordering: Accepted by Zaira Oliveros MD on 10/26/2017 11:24 AM    furosemide (LASIX) tablet 20 mg 20 mg Daily 11/3/2017     Sig - Route: Take 1 tablet by mouth Daily. - Oral    glucagon (human recombinant) (GLUCAGEN DIAGNOSTIC) injection 1 mg 1 mg Every 15 Minutes PRN 10/26/2017     Sig - Route: Inject 1 mg under the skin Every 15 (Fifteen) Minutes As Needed (Blood Glucose Less Than 70 - Patient Without IV Access - Unresponsive, NPO or Unable To Safely Swallow). - Subcutaneous    Cosign for Ordering: Accepted by Denise Estrada MD on 10/26/2017  7:18 PM    guaiFENesin (ROBITUSSIN) 100 MG/5ML oral solution 200 mg 200 mg Every 6 Hours PRN 10/28/2017     Sig -  "Route: Take 10 mL by mouth Every 6 (Six) Hours As Needed for Cough. - Oral    hydrocortisone (ANUSOL-HC) 2.5 % rectal cream  2 Times Daily 10/28/2017     Sig - Route: Insert  into the rectum 2 (Two) Times a Day. - Rectal    insulin aspart (novoLOG) injection 0-9 Units 0-9 Units 4 Times Daily Before Meals & Nightly 11/4/2017     Sig - Route: Inject 0-9 Units under the skin 4 (Four) Times a Day Before Meals & at Bedtime. - Subcutaneous    insulin aspart (novoLOG) injection 10 Units 10 Units 3 Times Daily With Meals 11/4/2017     Sig - Route: Inject 10 Units under the skin 3 (Three) Times a Day With Meals. - Subcutaneous    insulin detemir (LEVEMIR) injection 35 Units 35 Units Nightly 11/4/2017     Sig - Route: Inject 35 Units under the skin Every Night. - Subcutaneous    ipratropium (ATROVENT) nebulizer solution 0.5 mg 0.5 mg 2 Times Daily - RT 11/4/2017     Sig - Route: Take 2.5 mL by nebulization 2 (Two) Times a Day. - Nebulization    ipratropium-albuterol (DUO-NEB) nebulizer solution 3 mL 3 mL Every 4 Hours PRN 10/28/2017     Sig - Route: Take 3 mL by nebulization Every 4 (Four) Hours As Needed for Shortness of Air. - Nebulization    loperamide (IMODIUM) capsule 2 mg 2 mg 3 Times Daily PRN 10/29/2017     Sig - Route: Take 1 capsule by mouth 3 (Three) Times a Day As Needed for Diarrhea. - Oral    magic barrier cream  As Needed 10/31/2017     Sig - Route: Apply  topically As Needed for skin irritation. - Topical    Cosign for Ordering: Accepted by Tong Carrera MD on 11/5/2017  6:04 PM    Magnesium Sulfate 2 gram infusion- Mg 1.6 - 1.9 mg/dL 2 g As Needed 11/1/2017     Sig - Route: Infuse 50 mL into a venous catheter As Needed (Mg 1.6 - 1.9 mg/dL). - Intravenous    Linked Group 1:  \"Or\" Linked Group Details        magnesium sulfate 3 gram infusion (1gm x 3) - Mg 1.1 - 1.5 mg/dL 1 g As Needed 11/1/2017     Sig - Route: Infuse 100 mL into a venous catheter As Needed (Mg 1.1 - 1.5 mg/dL). - Intravenous    Linked Group " "1:  \"Or\" Linked Group Details        magnesium sulfate 4 gram infusion- Mg less than or equal to 1 mg/dL 4 g As Needed 11/1/2017     Sig - Route: Infuse 100 mL into a venous catheter As Needed (Mg less than or equal to 1 mg/dL). - Intravenous    Linked Group 1:  \"Or\" Linked Group Details        metroNIDAZOLE (FLAGYL) tablet 500 mg 500 mg Every 8 Hours Scheduled 11/3/2017 11/10/2017    Sig - Route: Take 2 tablets by mouth Every 8 (Eight) Hours. - Oral    pantoprazole (PROTONIX) EC tablet 40 mg 40 mg Every Morning 10/26/2017     Sig - Route: Take 1 tablet by mouth Every Morning. - Oral    Cosign for Ordering: Accepted by Zaira Oliveros MD on 10/26/2017 11:24 AM    potassium chloride (KLOR-CON) packet 40 mEq 40 mEq As Needed 11/1/2017     Sig - Route: Take 40 mEq by mouth As Needed (potassium replacement, see admin instructions). - Oral    Linked Group 2:  \"Or\" Linked Group Details        potassium chloride (MICRO-K) CR capsule 40 mEq 40 mEq As Needed 11/1/2017     Sig - Route: Take 4 capsules by mouth As Needed (potassium replacement.  see admin instructions). - Oral    Linked Group 2:  \"Or\" Linked Group Details        potassium chloride 10 mEq in 100 mL IVPB 10 mEq Every 1 Hour PRN 11/1/2017     Sig - Route: Infuse 100 mL into a venous catheter Every 1 (One) Hour As Needed (potassium protocol PERIPHERAL - see admin instructions). - Intravenous    Linked Group 2:  \"Or\" Linked Group Details        predniSONE (DELTASONE) tablet 10 mg 10 mg Daily With Breakfast 11/5/2017     Sig - Route: Take 1 tablet by mouth Daily With Breakfast. - Oral    Risaquad-2 capsule 1 capsule 1 capsule Daily 10/26/2017     Sig - Route: Take 1 capsule by mouth Daily. - Oral    rivaroxaban (XARELTO) tablet 15 mg 15 mg Daily With Dinner 10/29/2017     Sig - Route: Take 1 tablet by mouth Daily With Dinner. - Oral    rivastigmine (EXELON) 9.5 MG/24HR patch 1 patch 1 patch Daily 10/26/2017     Sig - Route: Place 1 patch on the skin Daily. - " "Transdermal    Cosign for Ordering: Accepted by Zaira Oliveros MD on 10/26/2017 11:24 AM    sodium chloride 0.9 % flush 1-10 mL 1-10 mL As Needed 10/26/2017     Sig - Route: Infuse 1-10 mL into a venous catheter As Needed for Line Care. - Intravenous    sodium chloride 0.9 % flush 10 mL 10 mL As Needed 10/26/2017     Sig - Route: Infuse 10 mL into a venous catheter As Needed for Line Care. - Intravenous    Linked Group 3:  \"And\" Linked Group Details        valsartan (DIOVAN) tablet 80 mg 80 mg Every 24 Hours Scheduled 11/1/2017     Sig - Route: Take 1 tablet by mouth Daily. - Oral    Risaquad-2 capsule 1 capsule (Discontinued) 1 capsule Daily 11/5/2017 11/5/2017    Sig - Route: Take 1 capsule by mouth Daily. - Oral    Cosign for Ordering: Required by Tong Carrera MD          Lab Results (last 24 hours)     Procedure Component Value Units Date/Time    POC Glucose Fingerstick [[id number]]  (Abnormal) Collected:  11/05/17 1138    Specimen:  Blood Updated:  11/05/17 1155     Glucose 220 (H) mg/dL     Narrative:       Meter: [id number] : [id number] manuel sachin    POC Glucose Fingerstick [[id number]]  (Abnormal) Collected:  11/05/17 1615    Specimen:  Blood Updated:  11/05/17 1624     Glucose 300 (H) mg/dL     Narrative:       Meter: [id number] : [id number] manuel sachin    POC Glucose Fingerstick [[id number]]  (Abnormal) Collected:  11/05/17 2046    Specimen:  Blood Updated:  11/05/17 2052     Glucose 302 (H) mg/dL     Narrative:       Meter: [id number] : [id number] GRADY HENRY    CBC & Differential [[id number]] Collected:  11/06/17 0055    Specimen:  Blood Updated:  11/06/17 0121    Narrative:       The following orders were created for panel order CBC & Differential.  Procedure                               Abnormality         Status                     ---------                               -----------         ------                     CBC Auto Differential[[id number]]        Abnormal     "        Final result                 Please view results for these tests on the individual orders.    CBC Auto Differential [[id number]]  (Abnormal) Collected:  11/06/17 0055    Specimen:  Blood Updated:  11/06/17 0121     WBC 11.58 10*3/mm3      RBC 4.58 10*6/mm3      Hemoglobin 12.4 g/dL      Hematocrit 40.2 %      MCV 87.8 fL      MCH 27.1 pg      MCHC 30.8 (L) g/dL      RDW 17.8 (H) %      RDW-SD 56.8 (H) fl      MPV 10.9 (H) fL      Platelets 301 10*3/mm3      Neutrophil % 68.2 %      Lymphocyte % 18.7 %      Monocyte % 11.6 %      Eosinophil % 0.9 %      Basophil % 0.1 %      Immature Grans % 0.5 %      Neutrophils, Absolute 7.89 (H) 10*3/mm3      Lymphocytes, Absolute 2.17 10*3/mm3      Monocytes, Absolute 1.34 (H) 10*3/mm3      Eosinophils, Absolute 0.11 10*3/mm3      Basophils, Absolute 0.01 10*3/mm3      Immature Grans, Absolute 0.06 (H) 10*3/mm3     C-reactive Protein [[id number]]  (Normal) Collected:  11/06/17 0055    Specimen:  Blood Updated:  11/06/17 0148     C-Reactive Protein <0.50 mg/dL     Basic Metabolic Panel [[id number]]  (Abnormal) Collected:  11/06/17 0055    Specimen:  Blood Updated:  11/06/17 0149     Glucose 136 (H) mg/dL      BUN 20 mg/dL      Creatinine 0.83 mg/dL      Sodium 140 mmol/L      Potassium 3.9 mmol/L      Chloride 103 mmol/L      CO2 31.4 mmol/L      Calcium 8.9 mg/dL      eGFR Non African Amer 66 mL/min/1.73      BUN/Creatinine Ratio 24.1     Anion Gap 5.6 mmol/L     Narrative:       The MDRD GFR formula is only valid for adults with stable renal function between ages 18 and 70.    Osmolality, Calculated [[id number]]  (Normal) Collected:  11/06/17 0055    Specimen:  Blood Updated:  11/06/17 0149     Osmolality Calc 284.1 mOsm/kg     BNP [[id number]]  (Abnormal) Collected:  11/06/17 0055    Specimen:  Blood Updated:  11/06/17 0151     .0 (H) pg/mL     POC Glucose Fingerstick [[id number]]  (Abnormal) Collected:  11/06/17 0757    Specimen:  Blood Updated:  11/06/17 0878      "Glucose 241 (H) mg/dL     Narrative:       Meter: [id number] : [id number] leticia aletha           Physician Progress Notes (last 24 hours) (Notes from 11/5/2017 10:38 AM through 11/6/2017 10:38 AM)      Tong Carrera MD at 11/5/2017 10:43 AM  Version 1 of 1           I have seen the patient in conjunction with Kamla ROBLERO and , patient has been up to the bedside commode and  states she only required some minimal assistance but strength was okay.  Appetite was good this morning and she had all of her breakfast.    As above history from patient and nursing and     History of presenting illness:  Patient is awake, she states her breathing is \"okay\".  She's having no chest pain or abdominal pain.  She is unsure whether she is having any edema.  Patient's catheter has been removed and she has been able to void.  History is very limited due to her dementia.    Vital Signs  Temp:  [97.6 °F (36.4 °C)-98.7 °F (37.1 °C)] 97.8 °F (36.6 °C)  Heart Rate:  [62-76] 62  Resp:  [16-22] 20  BP: (117-152)/(55-71) 135/55  Body mass index is 31.44 kg/(m^2).      Intake/Output Summary (Last 24 hours) at 11/05/17 1043  Last data filed at 11/04/17 2050   Gross per 24 hour   Intake              360 ml   Output              600 ml   Net             -240 ml         Intake & Output (last 3 days)       11/02 0701 - 11/03 0700 11/03 0701 - 11/04 0700 11/04 0701 - 11/05 0700 11/05 0701 - 11/06 0700    P.O. 525 1400 720     IV Piggyback 50       Total Intake(mL/kg) 575 (7.3) 1400 (17.4) 720 (8.9)     Urine (mL/kg/hr) 1925 (1) 530 (0.3) 600 (0.3)     Stool  0 (0) 0 (0)     Total Output 1925 530 600      Net -1350 +870 +120              Unmeasured Urine Occurrence   1 x     Unmeasured Stool Occurrence  3 x 4 x 1 x          Physical exam:  Physical Exam   Constitutional: Well-developed, well-nourished. She appears to stress  Head: Normocephalic and atraumatic.  Hearing appears to be intact but remains diminished mucous " membranes are moist.  Speech remains normal  Eyes:  Pupils are equal, round,  No scleral icterus.   Cardiovascular: Normal rate, regular rhythm and normal heart sounds.  No murmur rub or gallop  Pulmonary/Chest: Bilateral breath sounds some occasional end expiratory wheezing heard but otherwise clear  Abdominal: Soft, flat, bowel sounds are active, nondistended nontender.  No peritoneal signs   Musculoskeletal: Strength is symmetric good palpable distal pulses, no edema   Neurological: Awake, alert, pleasant  Skin: Skin is warm and dry.   Sales catheter is out    Telemetry: sinus, reviewed    Results Review:  Lab Results   Component Value Date    WBC 11.11 11/04/2017    HGB 11.8 (L) 11/04/2017    HCT 38.6 11/04/2017    MCV 86.7 11/04/2017     11/04/2017     Lab Results   Component Value Date    GLUCOSE 293 (H) 11/04/2017    BUN 24 (H) 11/04/2017    CREATININE 1.06 11/04/2017    EGFRIFNONA 50 (L) 11/04/2017    BCR 22.6 11/04/2017    CO2 31.5 11/04/2017    CALCIUM 9.2 11/04/2017    ALBUMIN 3.30 (L) 10/31/2017    LABIL2 1.2 (L) 10/31/2017    AST 21 10/31/2017    ALT 19 10/31/2017       Imaging Results (last 72 hours)     Procedure Component Value Units Date/Time    XR Chest 1 View [[id number]] Collected:  10/28/17 0925     Updated:  10/28/17 0928    Narrative:       Technique: Frontal view the chest.     COMPARISON:  10/26/2017     INDICATION:     fever; I21.4-Non-ST elevation (NSTEMI) myocardial  infarction; N30.00-Acute cystitis without hematuria      FINDINGS:    Coarsened interstitial markings. Cardiomegaly is noted.  Prominent interstitial markings are noted. There are small bilateral  pleural effusions.        Impression:       Radiographic changes of congestive failure.     This report was finalized on 10/28/2017 9:26 AM by Dr. Harshal Stoddard MD.       XR Chest AP [[id number]] Collected:  10/30/17 0701     Updated:  10/30/17 0751    Narrative:       XR CHEST AP-     HISTORY:  CHF; I21.4-Non-ST elevation  (NSTEMI) myocardial infarction;  N30.00-Acute cystitis without hematuria          COMPARISON: 10/28/2017      TECHNIQUE: Single frontal view of the chest.     FINDINGS:     Bibasilar consolidation.  Mild CHF.  There is no evidence of an acute osseous abnormality.   There are no suspicious-appearing parenchymal soft tissue nodules.            Impression:       Bibasilar consolidation and mild CHF.         This report was finalized on 10/30/2017 7:49 AM by Dr. Daryl Rosas MD.         Echo EF 40 %    Chest x-ray from 11/2 showed improvement    No change in cultures, all blood cultures negative      Assessment/Plan     Active Problems:  NSTEMI (non-ST elevated myocardial infarction), being medically managed, . Patient is on Plavix, beta blocker, and high-dose statin.  She remains asymptomatic    Systolic congestive heart failure, ejection fraction 40%, on ARB, she remains on by mouth Lasix.  Will recheck electrolytes as well as BNP and chest x-ray in the a.m.  Overall she appears compensated.    Pneumonia, now on oral antibiotics.  She has some wheezing today so I'm holding on further weaning of her steroids.    UTI, Klebsiella, Omnicef should cover this as well    Severe Sepsis secondary to pneumonia and UTI, resolved    Acute hypoxic respiratory failure secondary to*congestive heart failure in combination with pneumonia stable on 2 L      Paroxysmal atrial fibrillation, now in sinus rhythm, she is being maintained on amiodarone therapy.  She's currently on this 3 times a day, will leave decision to change to twice a day if needed to cardiology.  She continues on xarelto for stroke prevention    Functional decline , therapy as available, strength appears to be improving.    Baseline dementia, stable and pleasant    Diabetes some wide fluctuations but in trying to prevent hypoglycemia, I'm holding on further insulin adjustments.    Awaiting placement a .    Tong Carrera MD  11/05/17  10:43 AM        Electronically signed by Tong Carrera MD at 11/5/2017 10:58 AM        Consult Notes (last 24 hours) (Notes from 11/5/2017 10:38 AM through 11/6/2017 10:38 AM)     No notes of this type exist for this encounter.        Physical Therapy Notes (last 24 hours) (Notes from 11/5/2017 10:38 AM through 11/6/2017 10:38 AM)     No notes of this type exist for this encounter.

## 2017-11-06 NOTE — PLAN OF CARE
Problem: Pressure Ulcer Risk (Alfred Scale) (Adult,Obstetrics,Pediatric)  Goal: Skin Integrity  Outcome: Ongoing (interventions implemented as appropriate)

## 2017-11-06 NOTE — PROGRESS NOTES
LOS: 11 days     Chief Complaint:  Pulmonology is following for pulmonary nodule.    Subjective     Interval History:     Mrs. Bryant is doing well this morning, she is resting in her bed, in no acute distress, her daughter is at her bedside this morning during rounds. Mrs. Bryant reports her breathing seems to be slowly improving.     History taken from: patient chart family RN    Review of Systems:   Review of Systems   Constitutional: Negative for chills, fatigue and fever.   HENT: Negative for congestion and rhinorrhea.    Eyes: Negative for photophobia and visual disturbance.   Respiratory: Positive for shortness of breath (feeling better). Negative for cough and wheezing.    Cardiovascular: Negative for chest pain and leg swelling.   Gastrointestinal: Negative for abdominal distention and abdominal pain.   Endocrine: Negative for cold intolerance and heat intolerance.   Genitourinary: Negative for difficulty urinating.   Musculoskeletal: Negative for arthralgias and myalgias.   Skin: Negative for color change and pallor.   Allergic/Immunologic: Negative for environmental allergies.   Neurological: Negative for dizziness, weakness and light-headedness.   Psychiatric/Behavioral: Negative for agitation, behavioral problems and confusion.                     Objective     Vital Signs  Temp:  [97.9 °F (36.6 °C)-98.8 °F (37.1 °C)] 98 °F (36.7 °C)  Heart Rate:  [64-75] 69  Resp:  [18-21] 20  BP: (106-157)/(50-81) 157/81  Body mass index is 30.36 kg/(m^2).    Intake/Output Summary (Last 24 hours) at 11/06/17 0903  Last data filed at 11/06/17 0344   Gross per 24 hour   Intake              120 ml   Output              600 ml   Net             -480 ml          Physical Exam:  GENERAL APPEARANCE: Well developed, well nourished, alert and cooperative, and appears to be in no acute distress.    HEAD: normocephalic.    EYES: PERRL    NECK: Neck supple.     CARDIAC: Normal S1 and S2. No S3, S4 or murmurs. Rhythm is regular.  There is no peripheral edema, cyanosis or pallor. Extremities are warm and well perfused. Capillary refill is less than 2 seconds. No carotid bruits.    Respiratory: occassional expiratory wheezing.     GI: Positive bowel sounds. Soft, nondistended, nontender.     Musculoskeletal: No significant deformity or joint abnormality. No edema. Peripheral pulses intact.     NEUROLOGICAL: Strength and sensation symmetric and intact throughout.     PSYCHIATRIC: The mental examination revealed the patient was oriented to person, place, and time, hx of dementia.                  Results Review:                I reviewed the patient's new clinical results.  I reviewed the patient's new imaging results and agree with the interpretation.    Results from last 7 days  Lab Units 11/06/17 0055 11/04/17 0106 11/03/17  0148   WBC 10*3/mm3 11.58 11.11 10.98   HEMOGLOBIN g/dL 12.4 11.8* 11.6*   PLATELETS 10*3/mm3 301 316 294       Results from last 7 days  Lab Units 11/06/17 0055 11/04/17 0106 11/03/17 0148 11/02/17  0824 11/02/17  0041 11/01/17  0954   SODIUM mmol/L 140 138 138  --  137 138   POTASSIUM mmol/L 3.9 3.8 3.9  --  4.0 4.4   CHLORIDE mmol/L 103 101 97*  --  96* 99   CO2 mmol/L 31.4 31.5 32.2*  --  30.3 30.5   BUN mg/dL 20 24* 27*  --  27* 22*   CREATININE mg/dL 0.83 1.06 1.13  --  1.06 0.92   CALCIUM mg/dL 8.9 9.2 8.8  --  8.7 8.8   GLUCOSE mg/dL 136* 293* 219*  --  321* 342*   MAGNESIUM mg/dL  --   --   --  2.1 2.3 1.8     Lab Results   Component Value Date    INR 1.14 (H) 10/26/2017    PROTIME 14.8 10/26/2017       Results from last 7 days  Lab Units 10/31/17  0331   ALK PHOS U/L 64   BILIRUBIN mg/dL 0.4   ALT (SGPT) U/L 19   AST (SGOT) U/L 21              Imaging Results (last 24 hours)     Procedure Component Value Units Date/Time    XR Chest AP [[id number]] Collected:  11/06/17 0804     Updated:  11/06/17 0807    Narrative:       EXAMINATION: XR CHEST AP-      CLINICAL INDICATION:     Eval CHF; I21.4-Non-ST elevation  (NSTEMI)  myocardial infarction; N30.00-Acute cystitis without hematuria     TECHNIQUE:  XR CHEST AP-      COMPARISON: 11/3/2017      FINDINGS:   Cardiomegaly noted. Pulmonary vascularity within normal limits. No new  opacities identified. No pneumothorax. Chest otherwise stable.       Impression:       Continued improvement and now near resolution of right lung  opacities. Otherwise stable chest.     This report was finalized on 11/6/2017 8:05 AM by Dr. Wilman Griffin MD.                Medication Review:   Scheduled Medications:    amiodarone 200 mg Oral TID   atorvastatin 80 mg Oral Nightly   carvedilol 6.25 mg Oral BID With Meals   cefdinir 300 mg Oral Q12H   cetirizine 5 mg Oral Daily   cholecalciferol 50,000 Units Oral Weekly   clopidogrel 75 mg Oral Daily   ferrous sulfate 325 mg Oral Daily With Breakfast   furosemide 20 mg Oral Daily   hydrocortisone  Rectal BID   insulin aspart 0-9 Units Subcutaneous 4x Daily AC & at Bedtime   insulin aspart 10 Units Subcutaneous TID With Meals   insulin detemir 35 Units Subcutaneous Nightly   ipratropium 0.5 mg Nebulization BID - RT   metroNIDAZOLE 500 mg Oral Q8H   pantoprazole 40 mg Oral QAM   predniSONE 10 mg Oral Daily With Breakfast   Risaquad-2 1 capsule Oral Daily   rivaroxaban 15 mg Oral Daily With Dinner   rivastigmine 1 patch Transdermal Daily   valsartan 80 mg Oral Q24H     Continuous infusions:       Assessment/Plan      Respiratory Failure/Hypoxia: likely related to pneumonia and fluid overload. Continue scheduled inhalants and nebulizer's. Continue scheduled inhalants and nebulizer's. Sp02 97% on 3 liters NC. Continue prednisone PO. Lasix 20mg PO daily ordered by primary, creatinine improved to .83.  WBC 11.58, neutrophils 68.     Video swallow was negative for aspiration, would recommend universal aspiration precautions.     She was started on omnicef and flagyl PO over the weekend, she has been having diarrhea, c-diff was negative, it is likely from the  Omnicef.      Pulmonary Nodules: will follow up outpatient once discharged.     Patient Active Problem List   Diagnosis Code   • NSTEMI (non-ST elevated myocardial infarction) I21.4   • Essential hypertension I10   • Type II diabetes mellitus E11.9   • Dementia F03.90   • Sepsis secondary to UTI A41.9, N39.0   • Pulmonary nodules, bilateral R91.8   • RBBB I45.10   • Chronic right basal ganglia lacunar infarct on previous head CT I63.9   • Advanced age R54   • Hiatal hernia K44.9   • GERD (gastroesophageal reflux disease) K21.9   • Acute respiratory failure with hypoxia J96.01   • Sepsis due to bilateral pneumonia J18.9, A41.9     Case discussed with daughter and patient, all questions answered to their satisfaction.     LILY Thomason  11/06/17  9:27 AM      Scribed for Dr. Nash by LILY Jorge.       IWesley M.D. attest that the above note accurately reflects the work and decisions made  by me.  Patient was seen and evaluated by Dr. Nash, including history of present illness, physical exam, assessment, and treatment plan.  The above note was reviewed and edited by Dr. Nash.

## 2017-11-06 NOTE — PROGRESS NOTES
Discharge Planning Assessment  DEQUAN Vasquez     Patient Name: Floridalma Bryant  MRN: [id number]  Today's Date: 11/6/2017    Admit Date: 10/26/2017          Discharge Needs Assessment     None            Discharge Plan       11/06/17 1410    Final Note    Final Note Pt to be discharged to [facility] [place] in [facility] on this date. Facility aware per Cari.  Pt family aware and agreeable.  Pt to be transported via EMS.  No further intervention needed.         Discharge Placement     No information found        Expected Discharge Date and Time     Expected Discharge Date Expected Discharge Time    Nov 6, 2017               Demographic Summary     None            Functional Status     None            Psychosocial     None            Abuse/Neglect     None            Legal     None            Substance Abuse     None            Patient Forms     None          Dhara Lew

## 2017-11-22 ENCOUNTER — OFFICE VISIT (OUTPATIENT)
Dept: PULMONOLOGY | Facility: CLINIC | Age: 82
End: 2017-11-22

## 2017-11-22 VITALS
DIASTOLIC BLOOD PRESSURE: 62 MMHG | WEIGHT: 175 LBS | HEIGHT: 63 IN | HEART RATE: 70 BPM | OXYGEN SATURATION: 95 % | SYSTOLIC BLOOD PRESSURE: 100 MMHG | BODY MASS INDEX: 31.01 KG/M2 | TEMPERATURE: 97.8 F

## 2017-11-22 DIAGNOSIS — R91.8 MULTIPLE PULMONARY NODULES: ICD-10-CM

## 2017-11-22 DIAGNOSIS — J69.0 ASPIRATION PNEUMONIA, UNSPECIFIED ASPIRATION PNEUMONIA TYPE, UNSPECIFIED LATERALITY, UNSPECIFIED PART OF LUNG (HCC): ICD-10-CM

## 2017-11-22 DIAGNOSIS — R06.02 SHORTNESS OF BREATH: Primary | ICD-10-CM

## 2017-11-22 DIAGNOSIS — R09.02 HYPOXIA: ICD-10-CM

## 2017-11-22 PROCEDURE — 99214 OFFICE O/P EST MOD 30 MIN: CPT | Performed by: NURSE PRACTITIONER

## 2017-11-22 NOTE — PROGRESS NOTES
Subjective    Floridalma Bryant presents for the following Hospital Follow Up      History of Present Illness     Interval history since last visit:     Mrs. Bryant is a 83 year old female who was first seen by pulmonary in the hospital for her aspiration pneumonia after STEMI. She is brought into the clinic today by her grand daughter and son. She has been doing well, she has been doing rehab in [facility]. Mrs. Bryant has never smoked, and no history of second hand exposure. She has been doing well at rehab, physically progressing well. She is in no acute distress on today's exam.     Recent hospitalizations: Yes    Investigations (imaging, PFT's, labs, sleep study, record requests, etc.) None    Have you had the Influenza Vaccine? no   Would you like to receive this Vaccine today? no    Have you had the Pneumonia Vaccine?  no  Would you like to receive this Vaccine today? no    Review of Systems   Constitutional: Negative for activity change, fatigue and unexpected weight change.   HENT: Negative for congestion, postnasal drip and rhinorrhea.    Respiratory: Positive for cough. Negative for apnea, chest tightness, shortness of breath and wheezing.    Cardiovascular: Negative for chest pain and palpitations.   Gastrointestinal: Negative for nausea.   Allergic/Immunologic: Negative for environmental allergies.   Psychiatric/Behavioral: Negative for agitation and confusion.       Active Problems:  Problem List Items Addressed This Visit     Multiple pulmonary nodules    Shortness of breath - Primary    Relevant Orders    Pulmonary Function Test    Hypoxia      Other Visit Diagnoses     Aspiration pneumonia, unspecified aspiration pneumonia type, unspecified laterality, unspecified part of lung              Past Medical History:  Past Medical History:   Diagnosis Date   • Dementia    • Dementia    • Diabetes mellitus    • Hypertension    • Pancreatitis        Family History:  History reviewed. No pertinent family  history.    Social History:  Social History   Substance Use Topics   • Smoking status: Never Smoker   • Smokeless tobacco: Never Used   • Alcohol use No       Current Medications:  Current Outpatient Prescriptions   Medication Sig Dispense Refill   • acetaminophen (TYLENOL) 325 MG tablet Take 2 tablets by mouth Every 4 (Four) Hours As Needed for Headache or Fever (temperature greater than 101.5 F).     • amiodarone (PACERONE) 200 MG tablet Take 1 tablet by mouth 3 (Three) Times a Day. Continue till 11/11/17 then change to BID     • atorvastatin (LIPITOR) 80 MG tablet Take 1 tablet by mouth Every Night.     • carvedilol (COREG) 6.25 MG tablet Take 1 tablet by mouth 2 (Two) Times a Day With Meals.     • clopidogrel (PLAVIX) 75 MG tablet Take 1 tablet by mouth Daily. 30 tablet    • ferrous sulfate 325 (65 FE) MG tablet Take 325 mg by mouth Daily With Breakfast.     • Fexofenadine HCl (ALLEGRA PO) Take 180 mg by mouth 2 (Two) Times a Day.     • furosemide (LASIX) 20 MG tablet Take 1 tablet by mouth Daily.     • guaiFENesin (ROBITUSSIN) 100 MG/5ML solution oral solution Take 10 mL by mouth Every 6 (Six) Hours As Needed (cough and congestion).     • insulin aspart (novoLOG) 100 UNIT/ML injection Inject 10 Units under the skin 3 (Three) Times a Day With Meals.  12   • insulin detemir (LEVEMIR) 100 UNIT/ML injection Inject 25 Units under the skin Every Night.  12   • ipratropium-albuterol (DUO-NEB) 0.5-2.5 mg/mL nebulizer Take 3 mL by nebulization Every 4 (Four) Hours As Needed for Shortness of Air. 360 mL    • linagliptin (TRADJENTA) 5 MG tablet tablet Take 5 mg by mouth Daily.     • loperamide (IMODIUM) 2 MG capsule Take 1 capsule by mouth 3 (Three) Times a Day As Needed for Diarrhea.     • omeprazole (priLOSEC) 20 MG capsule Take 20 mg by mouth Daily.     • rivaroxaban (XARELTO) 15 MG tablet Take 1 tablet by mouth Daily With Dinner. 42 tablet    • rivastigmine (EXELON) 9.5 MG/24HR patch Place 1 patch on the skin Daily.    "  • valsartan (DIOVAN) 80 MG tablet Take 1 tablet by mouth Daily.     • vitamin D (ERGOCALCIFEROL) 77837 units capsule capsule Take 50,000 Units by mouth 1 (One) Time Per Week.       No current facility-administered medications for this visit.        Allergies:  Allergies   Allergen Reactions   • Codeine    • Metformin And Related        Vitals:  /62  Pulse 70  Temp 97.8 °F (36.6 °C) (Oral)   Ht 63\" (160 cm)  Wt 175 lb (79.4 kg)  LMP  (LMP Unknown)  SpO2 95%  BMI 31 kg/m2    Imaging:    Imaging Results (most recent)     None          Pulmonary Functions Testing Results:    No results found for: FEV1, FVC, SPE7GCN, TLC, DLCO    Results for orders placed or performed during the hospital encounter of 10/26/17   Blood Culture - Blood,   Result Value Ref Range    Blood Culture No growth at 5 days    Blood Culture - Blood,   Result Value Ref Range    Blood Culture No growth at 5 days    Urine Culture - Urine, Urine, Clean Catch   Result Value Ref Range    Urine Culture      Urine Culture >100,000 CFU/mL Klebsiella pneumoniae (A)        Susceptibility    Klebsiella pneumoniae - SIXTO     Amikacin <=16 Susceptible ug/ml     Amoxicillin + Clavulanate <=8/4 Susceptible ug/ml     Ampicillin >16 Resistant ug/ml     Ampicillin + Sulbactam <=8/4 Susceptible ug/ml     Aztreonam <=8 Susceptible ug/ml     Cefazolin <=8 Susceptible ug/ml     Ciprofloxacin <=1 Susceptible ug/ml     Doripenem <=0.5 Susceptible ug/ml     Ertapenem <=1 Susceptible ug/ml     Gentamicin <=4 Susceptible ug/ml     Imipenem <=1 Susceptible ug/ml     Levofloxacin <=2 Susceptible ug/ml     Nitrofurantoin <=32 Susceptible ug/ml     Piperacillin + Tazobactam <=16 Susceptible ug/ml     Tetracycline <=4 Susceptible ug/ml     Tobramycin <=4 Susceptible ug/ml     Trimethoprim + Sulfamethoxazole <=2/38 Susceptible ug/ml   Influenza Antigen, Rapid - Swab, Nasopharynx   Result Value Ref Range    Influenza A Ag, EIA Negative Negative    Influenza B Ag, EIA " Negative Negative   Blood Culture - Blood,   Result Value Ref Range    Blood Culture No growth at 5 days    Blood Culture - Blood,   Result Value Ref Range    Blood Culture No growth at 5 days    Urine Culture - Urine, Urine, Clean Catch   Result Value Ref Range    Urine Culture No growth    Clostridium Difficile Toxin, PCR - Stool, Per Rectum   Result Value Ref Range    C. Difficile Toxins by PCR Negative Negative    027 Toxin Presumptive Negative    Clostridium Difficile Toxin, PCR - Stool, Per Rectum   Result Value Ref Range    C. Difficile Toxins by PCR Negative Negative    027 Toxin Presumptive Negative    Comprehensive Metabolic Panel   Result Value Ref Range    Glucose 248 (H) 70 - 110 mg/dL    BUN 14 7 - 21 mg/dL    Creatinine 0.90 0.43 - 1.29 mg/dL    Sodium 137 135 - 153 mmol/L    Potassium 3.9 3.5 - 5.3 mmol/L    Chloride 101 99 - 112 mmol/L    CO2 26.3 24.3 - 31.9 mmol/L    Calcium 9.3 7.7 - 10.0 mg/dL    Total Protein 6.8 6.0 - 8.0 g/dL    Albumin 4.00 3.40 - 4.80 g/dL    ALT (SGPT) 18 10 - 36 U/L    AST (SGOT) 26 10 - 30 U/L    Alkaline Phosphatase 70 35 - 104 U/L    Total Bilirubin 0.7 0.2 - 1.8 mg/dL    eGFR Non African Amer 60 (L) >60 mL/min/1.73    Globulin 2.8 gm/dL    A/G Ratio 1.4 (L) 1.5 - 2.5 g/dL    BUN/Creatinine Ratio 15.6 7.0 - 25.0    Anion Gap 9.7 3.6 - 11.2 mmol/L   Lipase   Result Value Ref Range    Lipase 26 13 - 60 U/L   Urinalysis With / Culture If Indicated - Urine, Catheter   Result Value Ref Range    Color, UA Yellow Yellow, Straw    Appearance, UA Cloudy (A) Clear    pH, UA <=5.0 5.0 - 8.0    Specific Gravity, UA 1.022 1.005 - 1.030    Glucose,  mg/dL (Trace) (A) Negative    Ketones, UA Negative Negative    Bilirubin, UA Negative Negative    Blood, UA Trace (A) Negative    Protein, UA 30 mg/dL (1+) (A) Negative    Leuk Esterase, UA Moderate (2+) (A) Negative    Nitrite, UA Negative Negative    Urobilinogen, UA 0.2 E.U./dL 0.2 - 1.0 E.U./dL   Blood Gas, Arterial   Result  Value Ref Range    Site Arterial: left brachial     Rainer's Test N/A     pH, Arterial 7.390 7.350 - 7.450 pH units    pCO2, Arterial 42.7 35.0 - 45.0 mm Hg    pO2, Arterial 46.5 (C) 80.0 - 100.0 mm Hg    HCO3, Arterial 25.3 22.0 - 26.0 mmol/L    Base Excess, Arterial 0.2 mmol/L    O2 Saturation, Arterial 79.0 (C) 90.0 - 100.0 %    Hemoglobin, Blood Gas 13.4 12 - 16 g/dL    Hematocrit, Blood Gas 39.0 37.0 - 47.0 %    Oxyhemoglobin 77.8 (L) 85 - 100 %    Methemoglobin 0.30 0.00 - 3.00 %    Carboxyhemoglobin 1.2 0 - 5 %    A-a Gradiant 44.3 0.0 - 300.0 mmHg    Temperature 98.6 C    Barometric Pressure for Blood Gas 723 mmHg    Modality Room Air     FIO2 21 %   Lactic Acid, Plasma   Result Value Ref Range    Lactate 1.8 0.5 - 2.0 mmol/L   CBC Auto Differential   Result Value Ref Range    WBC 11.02 4.50 - 12.50 10*3/mm3    RBC 4.92 4.20 - 5.40 10*6/mm3    Hemoglobin 13.2 12.0 - 16.0 g/dL    Hematocrit 41.9 37.0 - 47.0 %    MCV 85.2 80.0 - 94.0 fL    MCH 26.8 (L) 27.0 - 33.0 pg    MCHC 31.5 (L) 33.0 - 37.0 g/dL    RDW 19.0 (H) 11.5 - 14.5 %    RDW-SD 59.0 (H) 37.0 - 54.0 fl    MPV 10.4 (H) 6.0 - 10.0 fL    Platelets 212 130 - 400 10*3/mm3    Neutrophil % 90.4 (H) 40.0 - 75.0 %    Lymphocyte % 5.6 (L) 16.0 - 46.0 %    Monocyte % 2.8 0.0 - 12.0 %    Eosinophil % 0.4 0.0 - 7.0 %    Basophil % 0.3 0.0 - 2.0 %    Immature Grans % 0.5 0.0 - 0.5 %    Neutrophils, Absolute 9.97 (H) 1.40 - 6.50 10*3/mm3    Lymphocytes, Absolute 0.62 (L) 1.00 - 3.00 10*3/mm3    Monocytes, Absolute 0.31 0.10 - 0.90 10*3/mm3    Eosinophils, Absolute 0.04 0.00 - 0.70 10*3/mm3    Basophils, Absolute 0.03 0.00 - 0.30 10*3/mm3    Immature Grans, Absolute 0.05 (H) 0.00 - 0.03 10*3/mm3   Urinalysis, Microscopic Only - Urine, Clean Catch   Result Value Ref Range    RBC, UA 0-2 None Seen, 0-2 /HPF    WBC, UA Too Numerous to Count (A) None Seen, 0-2 /HPF    Bacteria, UA 1+ (A) None Seen /HPF    Squamous Epithelial Cells, UA None Seen None Seen, 0-2 /HPF     Hyaline Casts, UA 7-12 None Seen /LPF    Methodology Automated Microscopy    Osmolality, Calculated   Result Value Ref Range    Osmolality Calc 282.6 273.0 - 305.0 mOsm/kg   D-dimer, Quantitative   Result Value Ref Range    D-Dimer, Quantitative 0.28 0.00 - 0.50 MCGFEU/mL   CK   Result Value Ref Range    Creatine Kinase 139 24 - 173 U/L   CK-MB   Result Value Ref Range    CKMB 18.84 (C) 0.00 - 5.00 ng/mL   Troponin   Result Value Ref Range    Troponin I 5.713 (C) <=0.040 ng/mL   Protime-INR   Result Value Ref Range    Protime 14.8 11.0 - 15.4 Seconds    INR 1.14 (H) 0.90 - 1.10   aPTT   Result Value Ref Range    PTT 27.9 23.8 - 36.1 seconds   BNP   Result Value Ref Range    .0 (H) 0.0 - 100.0 pg/mL   CK-MB Index   Result Value Ref Range    CK-MB Index 13.6 (H) 0.0 - 3.0 %   Lipid Panel   Result Value Ref Range    Total Cholesterol 150 0 - 200 mg/dL    Triglycerides 117 0 - 150 mg/dL    HDL Cholesterol 34 (L) 60 - 100 mg/dL    LDL Cholesterol  93 0 - 100 mg/dL    VLDL Cholesterol 23.4 mg/dL    LDL/HDL Ratio 2.72    Hemoglobin A1c   Result Value Ref Range    Hemoglobin A1C 8.90 (H) 4.50 - 5.70 %   Phosphorus   Result Value Ref Range    Phosphorus 4.1 2.7 - 4.5 mg/dL   Magnesium   Result Value Ref Range    Magnesium 1.6 (L) 1.7 - 2.6 mg/dL   CK   Result Value Ref Range    Creatine Kinase 170 24 - 173 U/L   CK-MB   Result Value Ref Range    CKMB 23.56 (C) 0.00 - 5.00 ng/mL   Troponin   Result Value Ref Range    Troponin I 8.003 (C) <=0.040 ng/mL   CK-MB Index   Result Value Ref Range    CK-MB Index 13.9 (H) 0.0 - 3.0 %   Troponin   Result Value Ref Range    Troponin I 8.245 (C) <=0.040 ng/mL   Troponin   Result Value Ref Range    Troponin I 11.444 (C) <=0.040 ng/mL   CK   Result Value Ref Range    Creatine Kinase 203 (H) 24 - 173 U/L   CK   Result Value Ref Range    Creatine Kinase 235 (H) 24 - 173 U/L   CK-MB   Result Value Ref Range    CKMB 28.68 (C) 0.00 - 5.00 ng/mL   CK-MB   Result Value Ref Range    CKMB  26.35 (C) 0.00 - 5.00 ng/mL   Myoglobin, Serum   Result Value Ref Range    Myoglobin 163.0 (H) 0.0 - 109.0 ng/mL   Myoglobin, Serum   Result Value Ref Range    Myoglobin 160.0 (H) 0.0 - 109.0 ng/mL   CK-MB Index   Result Value Ref Range    CK-MB Index 14.1 (H) 0.0 - 3.0 %   Mycoplasma Pneumoniae Antibody, IgM   Result Value Ref Range    Mycoplasma pneumo IgM Negative Negative   Legionella Antigen, Urine - Urine, Clean Catch   Result Value Ref Range    LEGIONELLA ANTIGEN, URINE Negative Negative   Troponin   Result Value Ref Range    Troponin I 13.235 (C) <=0.040 ng/mL   CK   Result Value Ref Range    Creatine Kinase 265 (H) 24 - 173 U/L   CK-MB   Result Value Ref Range    CKMB 31.58 (C) 0.00 - 5.00 ng/mL   Myoglobin, Serum   Result Value Ref Range    Myoglobin 130.0 (H) 0.0 - 109.0 ng/mL   CK-MB Index   Result Value Ref Range    CK-MB Index 11.2 (H) 0.0 - 3.0 %   CK-MB Index   Result Value Ref Range    CK-MB Index 11.9 (H) 0.0 - 3.0 %   Magnesium   Result Value Ref Range    Magnesium 2.2 1.7 - 2.6 mg/dL   Phosphorus   Result Value Ref Range    Phosphorus 3.2 2.7 - 4.5 mg/dL   TSH   Result Value Ref Range    TSH 0.665 0.550 - 4.780 mIU/mL   C-reactive Protein   Result Value Ref Range    C-Reactive Protein 9.27 (H) 0.00 - 0.99 mg/dL   Mycoplasma Pneumoniae Antibody, IgM   Result Value Ref Range    Mycoplasma pneumo IgM Negative Negative   Basic Metabolic Panel   Result Value Ref Range    Glucose 159 (H) 70 - 110 mg/dL    BUN 14 7 - 21 mg/dL    Creatinine 0.71 0.43 - 1.29 mg/dL    Sodium 137 135 - 153 mmol/L    Potassium 4.2 3.5 - 5.3 mmol/L    Chloride 106 99 - 112 mmol/L    CO2 25.8 24.3 - 31.9 mmol/L    Calcium 8.2 7.7 - 10.0 mg/dL    eGFR Non African Amer 79 >60 mL/min/1.73    BUN/Creatinine Ratio 19.7 7.0 - 25.0    Anion Gap 5.2 3.6 - 11.2 mmol/L   CBC Auto Differential   Result Value Ref Range    WBC 7.45 4.50 - 12.50 10*3/mm3    RBC 4.27 4.20 - 5.40 10*6/mm3    Hemoglobin 11.4 (L) 12.0 - 16.0 g/dL    Hematocrit  37.4 37.0 - 47.0 %    MCV 87.6 80.0 - 94.0 fL    MCH 26.7 (L) 27.0 - 33.0 pg    MCHC 30.5 (L) 33.0 - 37.0 g/dL    RDW 19.0 (H) 11.5 - 14.5 %    RDW-SD 60.2 (H) 37.0 - 54.0 fl    MPV 10.5 (H) 6.0 - 10.0 fL    Platelets 147 130 - 400 10*3/mm3    Neutrophil % 69.7 40.0 - 75.0 %    Lymphocyte % 16.6 16.0 - 46.0 %    Monocyte % 13.0 (H) 0.0 - 12.0 %    Eosinophil % 0.1 0.0 - 7.0 %    Basophil % 0.3 0.0 - 2.0 %    Immature Grans % 0.3 0.0 - 0.5 %    Neutrophils, Absolute 5.19 1.40 - 6.50 10*3/mm3    Lymphocytes, Absolute 1.24 1.00 - 3.00 10*3/mm3    Monocytes, Absolute 0.97 (H) 0.10 - 0.90 10*3/mm3    Eosinophils, Absolute 0.01 0.00 - 0.70 10*3/mm3    Basophils, Absolute 0.02 0.00 - 0.30 10*3/mm3    Immature Grans, Absolute 0.02 0.00 - 0.03 10*3/mm3   CK   Result Value Ref Range    Creatine Kinase 211 (H) 24 - 173 U/L   CK-MB   Result Value Ref Range    CKMB 26.32 (C) 0.00 - 5.00 ng/mL   Troponin   Result Value Ref Range    Troponin I 16.021 (C) <=0.040 ng/mL   Osmolality, Calculated   Result Value Ref Range    Osmolality Calc 277.7 273.0 - 305.0 mOsm/kg   CK-MB Index   Result Value Ref Range    CK-MB Index 12.5 (H) 0.0 - 3.0 %   BNP   Result Value Ref Range    .0 (H) 0.0 - 100.0 pg/mL   CK Total & CKMB   Result Value Ref Range    CKMB 11.08 (C) 0.00 - 5.00 ng/mL    Creatine Kinase 96 24 - 173 U/L   Basic Metabolic Panel   Result Value Ref Range    Glucose 180 (H) 70 - 110 mg/dL    BUN 13 7 - 21 mg/dL    Creatinine 0.69 0.43 - 1.29 mg/dL    Sodium 138 135 - 153 mmol/L    Potassium 4.1 3.5 - 5.3 mmol/L    Chloride 108 99 - 112 mmol/L    CO2 26.7 24.3 - 31.9 mmol/L    Calcium 7.9 7.7 - 10.0 mg/dL    eGFR Non African Amer 81 >60 mL/min/1.73    BUN/Creatinine Ratio 18.8 7.0 - 25.0    Anion Gap 3.3 (L) 3.6 - 11.2 mmol/L   Troponin   Result Value Ref Range    Troponin I 8.083 (C) <=0.040 ng/mL   Magnesium   Result Value Ref Range    Magnesium 1.9 1.7 - 2.6 mg/dL   Phosphorus   Result Value Ref Range    Phosphorus 2.4 (L)  2.7 - 4.5 mg/dL   CBC Auto Differential   Result Value Ref Range    WBC 7.41 4.50 - 12.50 10*3/mm3    RBC 4.00 (L) 4.20 - 5.40 10*6/mm3    Hemoglobin 10.9 (L) 12.0 - 16.0 g/dL    Hematocrit 35.4 (L) 37.0 - 47.0 %    MCV 88.5 80.0 - 94.0 fL    MCH 27.3 27.0 - 33.0 pg    MCHC 30.8 (L) 33.0 - 37.0 g/dL    RDW 18.7 (H) 11.5 - 14.5 %    RDW-SD 59.0 (H) 37.0 - 54.0 fl    MPV 10.6 (H) 6.0 - 10.0 fL    Platelets 152 130 - 400 10*3/mm3    Neutrophil % 68.3 40.0 - 75.0 %    Lymphocyte % 16.7 16.0 - 46.0 %    Monocyte % 14.0 (H) 0.0 - 12.0 %    Eosinophil % 0.3 0.0 - 7.0 %    Basophil % 0.4 0.0 - 2.0 %    Immature Grans % 0.3 0.0 - 0.5 %    Neutrophils, Absolute 5.06 1.40 - 6.50 10*3/mm3    Lymphocytes, Absolute 1.24 1.00 - 3.00 10*3/mm3    Monocytes, Absolute 1.04 (H) 0.10 - 0.90 10*3/mm3    Eosinophils, Absolute 0.02 0.00 - 0.70 10*3/mm3    Basophils, Absolute 0.03 0.00 - 0.30 10*3/mm3    Immature Grans, Absolute 0.02 0.00 - 0.03 10*3/mm3   Osmolality, Calculated   Result Value Ref Range    Osmolality Calc 280.3 273.0 - 305.0 mOsm/kg   CK-MB Index   Result Value Ref Range    CK-MB Index 11.5 (H) 0.0 - 3.0 %   Urinalysis With / Culture If Indicated - Urine, Clean Catch   Result Value Ref Range    Color, UA Dark Yellow (A) Yellow, Straw    Appearance, UA Clear Clear    pH, UA <=5.0 5.0 - 8.0    Specific Gravity, UA 1.024 1.005 - 1.030    Glucose, UA Negative Negative    Ketones, UA 15 mg/dL (1+) (A) Negative    Bilirubin, UA Negative Negative    Blood, UA Negative Negative    Protein, UA Negative Negative    Leuk Esterase, UA Small (1+) (A) Negative    Nitrite, UA Negative Negative    Urobilinogen, UA 0.2 E.U./dL 0.2 - 1.0 E.U./dL   Urinalysis, Microscopic Only - Urine, Clean Catch   Result Value Ref Range    RBC, UA 0-2 None Seen, 0-2 /HPF    WBC, UA 3-6 (A) None Seen, 0-2 /HPF    Bacteria, UA Trace (A) None Seen /HPF    Squamous Epithelial Cells, UA 3-6 (A) None Seen, 0-2 /HPF    Hyaline Casts, UA None Seen None Seen /LPF     Methodology Manual Light Microscopy    TSH   Result Value Ref Range    TSH 2.215 0.550 - 4.780 mIU/mL   C-reactive Protein   Result Value Ref Range    C-Reactive Protein 8.38 (H) 0.00 - 0.99 mg/dL   CK Total & CKMB   Result Value Ref Range    CKMB 4.03 0.00 - 5.00 ng/mL    Creatine Kinase 49 24 - 173 U/L   Lactic Acid, Plasma   Result Value Ref Range    Lactate 1.6 0.5 - 2.0 mmol/L   Troponin   Result Value Ref Range    Troponin I 2.389 (C) <=0.040 ng/mL   CK-MB Index   Result Value Ref Range    CK-MB Index 8.2 (H) 0.0 - 3.0 %   Basic Metabolic Panel   Result Value Ref Range    Glucose 197 (H) 70 - 110 mg/dL    BUN 11 7 - 21 mg/dL    Creatinine 0.72 0.43 - 1.29 mg/dL    Sodium 138 135 - 153 mmol/L    Potassium 3.9 3.5 - 5.3 mmol/L    Chloride 106 99 - 112 mmol/L    CO2 26.0 24.3 - 31.9 mmol/L    Calcium 8.5 7.7 - 10.0 mg/dL    eGFR Non African Amer 77 >60 mL/min/1.73    BUN/Creatinine Ratio 15.3 7.0 - 25.0    Anion Gap 6.0 3.6 - 11.2 mmol/L   BNP   Result Value Ref Range    .0 (H) 0.0 - 100.0 pg/mL   CBC Auto Differential   Result Value Ref Range    WBC 8.59 4.50 - 12.50 10*3/mm3    RBC 4.16 (L) 4.20 - 5.40 10*6/mm3    Hemoglobin 11.1 (L) 12.0 - 16.0 g/dL    Hematocrit 36.1 (L) 37.0 - 47.0 %    MCV 86.8 80.0 - 94.0 fL    MCH 26.7 (L) 27.0 - 33.0 pg    MCHC 30.7 (L) 33.0 - 37.0 g/dL    RDW 18.6 (H) 11.5 - 14.5 %    RDW-SD 58.3 (H) 37.0 - 54.0 fl    MPV 10.5 (H) 6.0 - 10.0 fL    Platelets 159 130 - 400 10*3/mm3    Neutrophil % 72.7 40.0 - 75.0 %    Lymphocyte % 14.8 (L) 16.0 - 46.0 %    Monocyte % 11.4 0.0 - 12.0 %    Eosinophil % 0.3 0.0 - 7.0 %    Basophil % 0.5 0.0 - 2.0 %    Immature Grans % 0.3 0.0 - 0.5 %    Neutrophils, Absolute 6.24 1.40 - 6.50 10*3/mm3    Lymphocytes, Absolute 1.27 1.00 - 3.00 10*3/mm3    Monocytes, Absolute 0.98 (H) 0.10 - 0.90 10*3/mm3    Eosinophils, Absolute 0.03 0.00 - 0.70 10*3/mm3    Basophils, Absolute 0.04 0.00 - 0.30 10*3/mm3    Immature Grans, Absolute 0.03 0.00 - 0.03  10*3/mm3   Osmolality, Calculated   Result Value Ref Range    Osmolality Calc 280.6 273.0 - 305.0 mOsm/kg   Comprehensive Metabolic Panel   Result Value Ref Range    Glucose 140 (H) 70 - 110 mg/dL    BUN 11 7 - 21 mg/dL    Creatinine 0.78 0.43 - 1.29 mg/dL    Sodium 141 135 - 153 mmol/L    Potassium 3.6 3.5 - 5.3 mmol/L    Chloride 104 99 - 112 mmol/L    CO2 31.3 24.3 - 31.9 mmol/L    Calcium 8.8 7.7 - 10.0 mg/dL    Total Protein 6.0 6.0 - 8.0 g/dL    Albumin 3.30 (L) 3.40 - 4.80 g/dL    ALT (SGPT) 19 10 - 36 U/L    AST (SGOT) 21 10 - 30 U/L    Alkaline Phosphatase 64 35 - 104 U/L    Total Bilirubin 0.4 0.2 - 1.8 mg/dL    eGFR Non African Amer 71 >60 mL/min/1.73    Globulin 2.7 gm/dL    A/G Ratio 1.2 (L) 1.5 - 2.5 g/dL    BUN/Creatinine Ratio 14.1 7.0 - 25.0    Anion Gap 5.7 3.6 - 11.2 mmol/L   C-reactive Protein   Result Value Ref Range    C-Reactive Protein 3.64 (H) 0.00 - 0.99 mg/dL   Magnesium   Result Value Ref Range    Magnesium 1.6 (L) 1.7 - 2.6 mg/dL   CBC Auto Differential   Result Value Ref Range    WBC 7.99 4.50 - 12.50 10*3/mm3    RBC 4.47 4.20 - 5.40 10*6/mm3    Hemoglobin 11.8 (L) 12.0 - 16.0 g/dL    Hematocrit 38.3 37.0 - 47.0 %    MCV 85.7 80.0 - 94.0 fL    MCH 26.4 (L) 27.0 - 33.0 pg    MCHC 30.8 (L) 33.0 - 37.0 g/dL    RDW 18.3 (H) 11.5 - 14.5 %    RDW-SD 57.6 (H) 37.0 - 54.0 fl    MPV 10.9 (H) 6.0 - 10.0 fL    Platelets 205 130 - 400 10*3/mm3   Scan Slide   Result Value Ref Range    Scan Slide     Osmolality, Calculated   Result Value Ref Range    Osmolality Calc 283.0 273.0 - 305.0 mOsm/kg   BNP   Result Value Ref Range    .0 (H) 0.0 - 100.0 pg/mL   Basic Metabolic Panel   Result Value Ref Range    Glucose 342 (H) 70 - 110 mg/dL    BUN 22 (H) 7 - 21 mg/dL    Creatinine 0.92 0.43 - 1.29 mg/dL    Sodium 138 135 - 153 mmol/L    Potassium 4.4 3.5 - 5.3 mmol/L    Chloride 99 99 - 112 mmol/L    CO2 30.5 24.3 - 31.9 mmol/L    Calcium 8.8 7.7 - 10.0 mg/dL    eGFR Non  Amer 58 (L) >60  mL/min/1.73    BUN/Creatinine Ratio 23.9 7.0 - 25.0    Anion Gap 8.5 3.6 - 11.2 mmol/L   CBC Auto Differential   Result Value Ref Range    WBC 15.30 (H) 4.50 - 12.50 10*3/mm3    RBC 4.64 4.20 - 5.40 10*6/mm3    Hemoglobin 12.3 12.0 - 16.0 g/dL    Hematocrit 39.4 37.0 - 47.0 %    MCV 84.9 80.0 - 94.0 fL    MCH 26.5 (L) 27.0 - 33.0 pg    MCHC 31.2 (L) 33.0 - 37.0 g/dL    RDW 18.4 (H) 11.5 - 14.5 %    RDW-SD 57.1 (H) 37.0 - 54.0 fl    MPV 10.8 (H) 6.0 - 10.0 fL    Platelets 290 130 - 400 10*3/mm3    Neutrophil % 84.8 (H) 40.0 - 75.0 %    Lymphocyte % 8.4 (L) 16.0 - 46.0 %    Monocyte % 6.1 0.0 - 12.0 %    Eosinophil % 0.0 0.0 - 7.0 %    Basophil % 0.2 0.0 - 2.0 %    Immature Grans % 0.5 0.0 - 0.5 %    Neutrophils, Absolute 12.97 (H) 1.40 - 6.50 10*3/mm3    Lymphocytes, Absolute 1.29 1.00 - 3.00 10*3/mm3    Monocytes, Absolute 0.93 (H) 0.10 - 0.90 10*3/mm3    Eosinophils, Absolute 0.00 0.00 - 0.70 10*3/mm3    Basophils, Absolute 0.03 0.00 - 0.30 10*3/mm3    Immature Grans, Absolute 0.08 (H) 0.00 - 0.03 10*3/mm3   Magnesium   Result Value Ref Range    Magnesium 1.8 1.7 - 2.6 mg/dL   Scan Slide   Result Value Ref Range    Anisocytosis Mod/2+ None Seen    Hypochromia Slight/1+ None Seen    Platelet Morphology Normal Normal   Osmolality, Calculated   Result Value Ref Range    Osmolality Calc 292.5 273.0 - 305.0 mOsm/kg   Basic Metabolic Panel   Result Value Ref Range    Glucose 321 (H) 70 - 110 mg/dL    BUN 27 (H) 7 - 21 mg/dL    Creatinine 1.06 0.43 - 1.29 mg/dL    Sodium 137 135 - 153 mmol/L    Potassium 4.0 3.5 - 5.3 mmol/L    Chloride 96 (L) 99 - 112 mmol/L    CO2 30.3 24.3 - 31.9 mmol/L    Calcium 8.7 7.7 - 10.0 mg/dL    eGFR Non African Amer 50 (L) >60 mL/min/1.73    BUN/Creatinine Ratio 25.5 (H) 7.0 - 25.0    Anion Gap 10.7 3.6 - 11.2 mmol/L   C-reactive Protein   Result Value Ref Range    C-Reactive Protein 1.72 (H) 0.00 - 0.99 mg/dL   BNP   Result Value Ref Range    .0 (H) 0.0 - 100.0 pg/mL   Magnesium    Result Value Ref Range    Magnesium 2.3 1.7 - 2.6 mg/dL   CBC Auto Differential   Result Value Ref Range    WBC 14.80 (H) 4.50 - 12.50 10*3/mm3    RBC 4.57 4.20 - 5.40 10*6/mm3    Hemoglobin 12.3 12.0 - 16.0 g/dL    Hematocrit 38.6 37.0 - 47.0 %    MCV 84.5 80.0 - 94.0 fL    MCH 26.9 (L) 27.0 - 33.0 pg    MCHC 31.9 (L) 33.0 - 37.0 g/dL    RDW 18.5 (H) 11.5 - 14.5 %    RDW-SD 57.3 (H) 37.0 - 54.0 fl    MPV 11.0 (H) 6.0 - 10.0 fL    Platelets 323 130 - 400 10*3/mm3    Neutrophil % 85.6 (H) 40.0 - 75.0 %    Lymphocyte % 8.4 (L) 16.0 - 46.0 %    Monocyte % 5.3 0.0 - 12.0 %    Eosinophil % 0.0 0.0 - 7.0 %    Basophil % 0.2 0.0 - 2.0 %    Immature Grans % 0.5 0.0 - 0.5 %    Neutrophils, Absolute 12.66 (H) 1.40 - 6.50 10*3/mm3    Lymphocytes, Absolute 1.24 1.00 - 3.00 10*3/mm3    Monocytes, Absolute 0.79 0.10 - 0.90 10*3/mm3    Eosinophils, Absolute 0.00 0.00 - 0.70 10*3/mm3    Basophils, Absolute 0.03 0.00 - 0.30 10*3/mm3    Immature Grans, Absolute 0.08 (H) 0.00 - 0.03 10*3/mm3    nRBC 0.0 0.0 - 0.0 /100 WBC   Osmolality, Calculated   Result Value Ref Range    Osmolality Calc 291.3 273.0 - 305.0 mOsm/kg   Magnesium   Result Value Ref Range    Magnesium 2.1 1.7 - 2.6 mg/dL   CBC (No Diff)   Result Value Ref Range    WBC 10.98 4.50 - 12.50 10*3/mm3    RBC 4.37 4.20 - 5.40 10*6/mm3    Hemoglobin 11.6 (L) 12.0 - 16.0 g/dL    Hematocrit 37.6 37.0 - 47.0 %    MCV 86.0 80.0 - 94.0 fL    MCH 26.5 (L) 27.0 - 33.0 pg    MCHC 30.9 (L) 33.0 - 37.0 g/dL    RDW 18.3 (H) 11.5 - 14.5 %    RDW-SD 57.2 (H) 37.0 - 54.0 fl    MPV 10.9 (H) 6.0 - 10.0 fL    Platelets 294 130 - 400 10*3/mm3   Basic Metabolic Panel   Result Value Ref Range    Glucose 219 (H) 70 - 110 mg/dL    BUN 27 (H) 7 - 21 mg/dL    Creatinine 1.13 0.43 - 1.29 mg/dL    Sodium 138 135 - 153 mmol/L    Potassium 3.9 3.5 - 5.3 mmol/L    Chloride 97 (L) 99 - 112 mmol/L    CO2 32.2 (H) 24.3 - 31.9 mmol/L    Calcium 8.8 7.7 - 10.0 mg/dL    eGFR Non  Amer 46 (L) >60  mL/min/1.73    BUN/Creatinine Ratio 23.9 7.0 - 25.0    Anion Gap 8.8 3.6 - 11.2 mmol/L   Osmolality, Calculated   Result Value Ref Range    Osmolality Calc 287.5 273.0 - 305.0 mOsm/kg   Basic Metabolic Panel   Result Value Ref Range    Glucose 293 (H) 70 - 110 mg/dL    BUN 24 (H) 7 - 21 mg/dL    Creatinine 1.06 0.43 - 1.29 mg/dL    Sodium 138 135 - 153 mmol/L    Potassium 3.8 3.5 - 5.3 mmol/L    Chloride 101 99 - 112 mmol/L    CO2 31.5 24.3 - 31.9 mmol/L    Calcium 9.2 7.7 - 10.0 mg/dL    eGFR Non African Amer 50 (L) >60 mL/min/1.73    BUN/Creatinine Ratio 22.6 7.0 - 25.0    Anion Gap 5.5 3.6 - 11.2 mmol/L   Phosphorus   Result Value Ref Range    Phosphorus 3.5 2.7 - 4.5 mg/dL   CBC Auto Differential   Result Value Ref Range    WBC 11.11 4.50 - 12.50 10*3/mm3    RBC 4.45 4.20 - 5.40 10*6/mm3    Hemoglobin 11.8 (L) 12.0 - 16.0 g/dL    Hematocrit 38.6 37.0 - 47.0 %    MCV 86.7 80.0 - 94.0 fL    MCH 26.5 (L) 27.0 - 33.0 pg    MCHC 30.6 (L) 33.0 - 37.0 g/dL    RDW 18.1 (H) 11.5 - 14.5 %    RDW-SD 57.4 (H) 37.0 - 54.0 fl    MPV 11.0 (H) 6.0 - 10.0 fL    Platelets 316 130 - 400 10*3/mm3    Neutrophil % 74.6 40.0 - 75.0 %    Lymphocyte % 13.2 (L) 16.0 - 46.0 %    Monocyte % 11.8 0.0 - 12.0 %    Eosinophil % 0.1 0.0 - 7.0 %    Basophil % 0.0 0.0 - 2.0 %    Immature Grans % 0.3 0.0 - 0.5 %    Neutrophils, Absolute 8.29 (H) 1.40 - 6.50 10*3/mm3    Lymphocytes, Absolute 1.47 1.00 - 3.00 10*3/mm3    Monocytes, Absolute 1.31 (H) 0.10 - 0.90 10*3/mm3    Eosinophils, Absolute 0.01 0.00 - 0.70 10*3/mm3    Basophils, Absolute 0.00 0.00 - 0.30 10*3/mm3    Immature Grans, Absolute 0.03 0.00 - 0.03 10*3/mm3   Osmolality, Calculated   Result Value Ref Range    Osmolality Calc 290.5 273.0 - 305.0 mOsm/kg   Basic Metabolic Panel   Result Value Ref Range    Glucose 136 (H) 70 - 110 mg/dL    BUN 20 7 - 21 mg/dL    Creatinine 0.83 0.43 - 1.29 mg/dL    Sodium 140 135 - 153 mmol/L    Potassium 3.9 3.5 - 5.3 mmol/L    Chloride 103 99 - 112  mmol/L    CO2 31.4 24.3 - 31.9 mmol/L    Calcium 8.9 7.7 - 10.0 mg/dL    eGFR Non African Amer 66 >60 mL/min/1.73    BUN/Creatinine Ratio 24.1 7.0 - 25.0    Anion Gap 5.6 3.6 - 11.2 mmol/L   C-reactive Protein   Result Value Ref Range    C-Reactive Protein <0.50 0.00 - 0.99 mg/dL   BNP   Result Value Ref Range    .0 (H) 0.0 - 100.0 pg/mL   CBC Auto Differential   Result Value Ref Range    WBC 11.58 4.50 - 12.50 10*3/mm3    RBC 4.58 4.20 - 5.40 10*6/mm3    Hemoglobin 12.4 12.0 - 16.0 g/dL    Hematocrit 40.2 37.0 - 47.0 %    MCV 87.8 80.0 - 94.0 fL    MCH 27.1 27.0 - 33.0 pg    MCHC 30.8 (L) 33.0 - 37.0 g/dL    RDW 17.8 (H) 11.5 - 14.5 %    RDW-SD 56.8 (H) 37.0 - 54.0 fl    MPV 10.9 (H) 6.0 - 10.0 fL    Platelets 301 130 - 400 10*3/mm3    Neutrophil % 68.2 40.0 - 75.0 %    Lymphocyte % 18.7 16.0 - 46.0 %    Monocyte % 11.6 0.0 - 12.0 %    Eosinophil % 0.9 0.0 - 7.0 %    Basophil % 0.1 0.0 - 2.0 %    Immature Grans % 0.5 0.0 - 0.5 %    Neutrophils, Absolute 7.89 (H) 1.40 - 6.50 10*3/mm3    Lymphocytes, Absolute 2.17 1.00 - 3.00 10*3/mm3    Monocytes, Absolute 1.34 (H) 0.10 - 0.90 10*3/mm3    Eosinophils, Absolute 0.11 0.00 - 0.70 10*3/mm3    Basophils, Absolute 0.01 0.00 - 0.30 10*3/mm3    Immature Grans, Absolute 0.06 (H) 0.00 - 0.03 10*3/mm3   Osmolality, Calculated   Result Value Ref Range    Osmolality Calc 284.1 273.0 - 305.0 mOsm/kg   POC Glucose Fingerstick   Result Value Ref Range    Glucose 249 (H) 70 - 130 mg/dL   POC Glucose Fingerstick   Result Value Ref Range    Glucose 298 (H) 70 - 130 mg/dL   POC Glucose Fingerstick   Result Value Ref Range    Glucose 174 (H) 70 - 130 mg/dL   POC Glucose Fingerstick   Result Value Ref Range    Glucose 155 (H) 70 - 130 mg/dL   POC Glucose Fingerstick   Result Value Ref Range    Glucose 137 (H) 70 - 130 mg/dL   POC Glucose Fingerstick   Result Value Ref Range    Glucose 145 (H) 70 - 130 mg/dL   POC Glucose Fingerstick   Result Value Ref Range    Glucose 237 (H) 70 -  130 mg/dL   POC Glucose Fingerstick   Result Value Ref Range    Glucose 158 (H) 70 - 130 mg/dL   POC Glucose Fingerstick   Result Value Ref Range    Glucose 175 (H) 70 - 130 mg/dL   POC Glucose Fingerstick   Result Value Ref Range    Glucose 187 (H) 70 - 130 mg/dL   POC Glucose Fingerstick   Result Value Ref Range    Glucose 223 (H) 70 - 130 mg/dL   POC Glucose Fingerstick   Result Value Ref Range    Glucose 195 (H) 70 - 130 mg/dL   POC Glucose Fingerstick   Result Value Ref Range    Glucose 211 (H) 70 - 130 mg/dL   POC Glucose Fingerstick   Result Value Ref Range    Glucose 234 (H) 70 - 130 mg/dL   POC Glucose Fingerstick   Result Value Ref Range    Glucose 222 (H) 70 - 130 mg/dL   POC Glucose Fingerstick   Result Value Ref Range    Glucose 203 (H) 70 - 130 mg/dL   POC Glucose Fingerstick   Result Value Ref Range    Glucose 168 (H) 70 - 130 mg/dL   POC Glucose Fingerstick   Result Value Ref Range    Glucose 171 (H) 70 - 130 mg/dL   POC Glucose Fingerstick   Result Value Ref Range    Glucose 138 (H) 70 - 130 mg/dL   POC Glucose Fingerstick   Result Value Ref Range    Glucose 269 (H) 70 - 130 mg/dL   POC Glucose Fingerstick   Result Value Ref Range    Glucose 174 (H) 70 - 130 mg/dL   POC Glucose Fingerstick   Result Value Ref Range    Glucose 148 (H) 70 - 130 mg/dL   POC Glucose Fingerstick   Result Value Ref Range    Glucose 308 (H) 70 - 130 mg/dL   POC Glucose Fingerstick   Result Value Ref Range    Glucose 329 (H) 70 - 130 mg/dL   POC Glucose Fingerstick   Result Value Ref Range    Glucose 257 (H) 70 - 130 mg/dL   POC Glucose Fingerstick   Result Value Ref Range    Glucose 245 (H) 70 - 130 mg/dL   POC Glucose Fingerstick   Result Value Ref Range    Glucose 277 (H) 70 - 130 mg/dL   POC Glucose Fingerstick   Result Value Ref Range    Glucose 396 (H) 70 - 130 mg/dL   POC Glucose Fingerstick   Result Value Ref Range    Glucose 377 (H) 70 - 130 mg/dL   POC Glucose Fingerstick   Result Value Ref Range    Glucose 273 (H)  70 - 130 mg/dL   POC Glucose Fingerstick   Result Value Ref Range    Glucose 375 (H) 70 - 130 mg/dL   POC Glucose Fingerstick   Result Value Ref Range    Glucose 144 (H) 70 - 130 mg/dL   POC Glucose Fingerstick   Result Value Ref Range    Glucose 256 (H) 70 - 130 mg/dL   POC Glucose Fingerstick   Result Value Ref Range    Glucose 188 (H) 70 - 130 mg/dL   POC Glucose Fingerstick   Result Value Ref Range    Glucose 282 (H) 70 - 130 mg/dL   POC Glucose Fingerstick   Result Value Ref Range    Glucose 187 (H) 70 - 130 mg/dL   POC Glucose Fingerstick   Result Value Ref Range    Glucose 405 (H) 70 - 130 mg/dL   POC Glucose Fingerstick   Result Value Ref Range    Glucose 344 (H) 70 - 130 mg/dL   POC Glucose Fingerstick   Result Value Ref Range    Glucose 212 (H) 70 - 130 mg/dL   POC Glucose Fingerstick   Result Value Ref Range    Glucose 140 (H) 70 - 130 mg/dL   POC Glucose Fingerstick   Result Value Ref Range    Glucose 229 (H) 70 - 130 mg/dL   POC Glucose Fingerstick   Result Value Ref Range    Glucose 315 (H) 70 - 130 mg/dL   POC Glucose Fingerstick   Result Value Ref Range    Glucose 216 (H) 70 - 130 mg/dL   POC Glucose Fingerstick   Result Value Ref Range    Glucose 118 70 - 130 mg/dL   POC Glucose Fingerstick   Result Value Ref Range    Glucose 220 (H) 70 - 130 mg/dL   POC Glucose Fingerstick   Result Value Ref Range    Glucose 300 (H) 70 - 130 mg/dL   POC Glucose Fingerstick   Result Value Ref Range    Glucose 302 (H) 70 - 130 mg/dL   POC Glucose Fingerstick   Result Value Ref Range    Glucose 241 (H) 70 - 130 mg/dL   POC Glucose Fingerstick   Result Value Ref Range    Glucose 234 (H) 70 - 130 mg/dL   Adult Transthoracic Echo Complete W/ Cont if Necessary Per Protocol   Result Value Ref Range    BSA 1.8 m^2    BH CV ECHO STEPHANIE - RVDD 2.7 cm    IVSd 1.1 cm    LVIDd 5.0 cm    LVIDs 3.4 cm    LVPWd 0.93 cm    IVS/LVPW 1.2     FS 31.6 %    EDV(Teich) 116.9 ml    ESV(Teich) 47.5 ml    EF(Teich) 59.4 %    EDV(cubed) 123.1  ml    ESV(cubed) 39.4 ml    EF(cubed) 68.0 %    LV mass(C)d 183.5 grams    LV mass(C)dI 102.7 grams/m^2    SV(Teich) 69.4 ml    SI(Teich) 38.8 ml/m^2    SV(cubed) 83.8 ml    SI(cubed) 46.9 ml/m^2    Ao root diam 2.5 cm    Ao root area 4.7 cm^2    ACS 1.3 cm    LA dimension 3.7 cm    LA/Ao 1.5     EDV(MOD-sp4) 30.5 ml    Ao root area (BSA corrected) 1.4     LV Diastolic corrected for BSA 17.1 ml/m^2    MV E max linden 65.4 cm/sec    MV A max linden 94.8 cm/sec    MV E/A 0.69     Ao pk linden 131.0 cm/sec    Ao max PG 6.9 mmHg    Ao max PG (full) 2.8 mmHg    Ao V2 mean 99.8 cm/sec    Ao mean PG 4.2 mmHg    Ao mean PG (full) 1.8 mmHg    Ao V2 VTI 20.7 cm    LV V1 max PG 4.1 mmHg    LV V1 mean PG 2.4 mmHg    LV V1 max 101.0 cm/sec    LV V1 mean 74.7 cm/sec    LV V1 VTI 17.8 cm    SV(Ao) 97.9 ml    SI(Ao) 54.8 ml/m^2    PA acc time 0.08 sec    TR max linden 293.1 cm/sec    RVSP(TR) 44.4 mmHg    RAP systole 10.0 mmHg    PA pr(Accel) 42.6 mmHg     CV ECHO STEPHANIE - BZI_BMI 37.3 kilograms/m^2     CV ECHO STEPHANIE - BSA(HAYCOCK) 1.9 m^2     CV ECHO STEPHANIE - BZI_METRIC_WEIGHT 84.0 kg     CV ECHO STEPHANIE - BZI_METRIC_HEIGHT 150.0 cm    Echo EF Estimated 40 %   Manual Differential   Result Value Ref Range    Neutrophil % 77.0 (H) 40.0 - 75.0 %    Lymphocyte % 16.0 16.0 - 46.0 %    Monocyte % 5.0 0.0 - 12.0 %    Eosinophil % 1.0 0.0 - 7.0 %    Atypical Lymphocyte % 1.0 0.0 - 5.0 %    Neutrophils Absolute 6.15 1.40 - 6.50 10*3/mm3    Lymphocytes Absolute 1.28 1.00 - 3.00 10*3/mm3    Monocytes Absolute 0.40 0.10 - 0.90 10*3/mm3    Eosinophils Absolute 0.08 0.00 - 0.70 10*3/mm3    Anisocytosis Mod/2+ None Seen    Hypochromia Slight/1+ None Seen    Platelet Morphology Normal Normal       Objective   Physical Exam   Constitutional: She appears well-developed and well-nourished.   HENT:   Head: Normocephalic.   Eyes: Pupils are equal, round, and reactive to light.   Neck: Normal range of motion.   Cardiovascular: Normal rate, regular rhythm, normal  heart sounds and intact distal pulses.    Pulmonary/Chest: Effort normal and breath sounds normal.   Abdominal: Soft. Bowel sounds are normal.   Musculoskeletal: Normal range of motion.   Neurological: She is alert.   Dementia, very hard of hearing   Skin: Skin is warm and dry.   Psychiatric: She has a normal mood and affect. Her behavior is normal.       Assessment/Plan      Aspiration Pneumonia: repeat chest xray mid December. Swallow study was completed in the hospital which was negative for aspiration. I have discussed universal aspiration precautions with the family and patient. I have also discussed signs and symptoms of pneumonia to prompt earlier treatment.     Multiple Pulmonary nodules: patient was found to have 3 lung nodules on CT scan in October, no previous CT scan for comparison, family reports no history of nodules. She has not had any unexplained weight loss, night sweats or unexplained fevers, no adenopathy.     I have discussed with the family that this could be fungal or bacterial infection and also possibly cancer, which is what we ultimately wish to rule out with further investigation. The family states that if it were cancer they would not want to treat it due to her dementia and age and other comorbidities.     They will discuss with other family and return in 4 weeks to discuss further with Dr. Nash.         Hypoxia: wearing oxygen at all times at rehab, compliant. Dangers of hypoxia discussed with patient and family.     Shortness of breath: likely related to recent pneumonia, deconditioning, body habitus and heart disease. Recently had STEMI, is being followed closely by cardiology. She is also undergoing rehab, shortness of breath is getting better but not back to baseline. Will order a Full PFT, patient has moderately advanced dementia she may not be able to complete the study. Continue current inhalers.     STEMI: mid October 2017 presented to [facility] with STEMI, Dr. Portillo  discussed with the patient and they chose to not undergo heart cath for stenting, only treatment they chose is Plavix which she is still taking. Following with cardiology outpatient.       ICD-10-CM ICD-9-CM   1. Shortness of breath R06.02 786.05   2. Multiple pulmonary nodules R91.8 793.19   3. Aspiration pneumonia, unspecified aspiration pneumonia type, unspecified laterality, unspecified part of lung J69.0 507.0   4. Hypoxia R09.02 799.02       Return in about 4 weeks (around 12/20/2017), or multiple pulmonary nodules.

## 2017-11-27 ENCOUNTER — OFFICE VISIT (OUTPATIENT)
Dept: CARDIOLOGY | Facility: CLINIC | Age: 82
End: 2017-11-27

## 2017-11-27 VITALS
OXYGEN SATURATION: 93 % | HEIGHT: 59 IN | DIASTOLIC BLOOD PRESSURE: 61 MMHG | BODY MASS INDEX: 36.29 KG/M2 | WEIGHT: 180 LBS | HEART RATE: 71 BPM | SYSTOLIC BLOOD PRESSURE: 113 MMHG

## 2017-11-27 DIAGNOSIS — I45.10 RBBB: Chronic | ICD-10-CM

## 2017-11-27 DIAGNOSIS — I21.4 NSTEMI (NON-ST ELEVATED MYOCARDIAL INFARCTION) (HCC): ICD-10-CM

## 2017-11-27 DIAGNOSIS — I10 ESSENTIAL HYPERTENSION: Primary | Chronic | ICD-10-CM

## 2017-11-27 PROCEDURE — 99213 OFFICE O/P EST LOW 20 MIN: CPT | Performed by: INTERNAL MEDICINE

## 2017-11-27 PROCEDURE — 93000 ELECTROCARDIOGRAM COMPLETE: CPT | Performed by: INTERNAL MEDICINE

## 2017-11-27 RX ORDER — CEFUROXIME AXETIL 250 MG/1
250 TABLET ORAL 2 TIMES DAILY
COMMUNITY
End: 2018-04-16

## 2017-11-27 RX ORDER — DOCUSATE CALCIUM 240 MG
240 CAPSULE ORAL DAILY PRN
COMMUNITY

## 2017-11-27 RX ORDER — ISOSORBIDE MONONITRATE 30 MG/1
30 TABLET, EXTENDED RELEASE ORAL DAILY
Qty: 30 TABLET | Refills: 11 | Status: SHIPPED | OUTPATIENT
Start: 2017-11-27

## 2017-11-27 RX ORDER — NYSTATIN 100000 U/G
CREAM TOPICAL 2 TIMES DAILY
Status: ON HOLD | COMMUNITY
End: 2019-02-06

## 2017-11-27 RX ORDER — DIAPER,BRIEF,INFANT-TODD,DISP
EACH MISCELLANEOUS 2 TIMES DAILY
Status: ON HOLD | COMMUNITY
End: 2019-02-06

## 2017-11-27 RX ORDER — FUROSEMIDE 20 MG/1
40 TABLET ORAL DAILY
Qty: 30 TABLET | Refills: 3 | Status: SHIPPED | OUTPATIENT
Start: 2017-11-27

## 2017-11-27 NOTE — PROGRESS NOTES
[facility] CARDIOLOGY  [address]  [place] [address]  Phone: [phone]  Fax: [phone]    11/27/2017    Chief Complaint: Routine followup    History:   Floridalma Bryant is a 83 y.o. female seen in followup, RECENT hsopitalization with PNA, NSTEMI, CHF, pAF. Now routinte f/u. Pt with advanced dementia. Med Rx  Elected. Pt in rehab. Pt remembers no angina. Daughter reports occasional angina.      Past Medical History:   Diagnosis Date   • Dementia    • Dementia    • Diabetes mellitus    • Hypertension    • Pancreatitis        Past Social History:  Social History     Social History   • Marital status:      Spouse name: N/A   • Number of children: N/A   • Years of education: N/A     Social History Main Topics   • Smoking status: Never Smoker   • Smokeless tobacco: Never Used   • Alcohol use No   • Drug use: No   • Sexual activity: Defer     Other Topics Concern   • None     Social History Narrative       Past Family History:  No family history on file.    Review of Systems:   Please see HPI  Constitution: No chills, no rigors, no unexplained weight loss or weight gain  Eyes:  No diplopia, no blurred vision, no loss of vision, conjunctiva is pink and sclera is anicteric  ENT:  No tinnitus, no otorrhea, no epistaxis, no sore throat   Respiratory: No cough, no hemoptysis  Cardiovascular: see HPI  Gastrointestinal: No nausea, no vomiting, no hematemesis, no diarrhea or constipation, no melena  Genitourinary: No frequency of dysuria no hematuria  Integument: No pruritis and  no skin rash  Hematologic / Lymphatic: No excessive bleeding, easy bruising, fatigue, lymphadenopathy and petechiae  Musculoskeletal: No joint pain, joint stiffness, joint swelling, muscle pain, muscle weakness and neck pain  Neurological: No dizziness, headaches, light headedness, seizures and vertigo  Endocrine: No frequent urination and nocturia, temperature intolerance, weight gain, unintended and  weight loss, unintended      Current Outpatient Prescriptions on File Prior to Visit   Medication Sig Dispense Refill   • acetaminophen (TYLENOL) 325 MG tablet Take 2 tablets by mouth Every 4 (Four) Hours As Needed for Headache or Fever (temperature greater than 101.5 F).     • amiodarone (PACERONE) 200 MG tablet Take 1 tablet by mouth 3 (Three) Times a Day. Continue till 11/11/17 then change to BID     • atorvastatin (LIPITOR) 80 MG tablet Take 1 tablet by mouth Every Night.     • carvedilol (COREG) 6.25 MG tablet Take 1 tablet by mouth 2 (Two) Times a Day With Meals.     • clopidogrel (PLAVIX) 75 MG tablet Take 1 tablet by mouth Daily. 30 tablet    • ferrous sulfate 325 (65 FE) MG tablet Take 325 mg by mouth Daily With Breakfast.     • Fexofenadine HCl (ALLEGRA PO) Take 180 mg by mouth 2 (Two) Times a Day.     • furosemide (LASIX) 20 MG tablet Take 1 tablet by mouth Daily.     • guaiFENesin (ROBITUSSIN) 100 MG/5ML solution oral solution Take 10 mL by mouth Every 6 (Six) Hours As Needed (cough and congestion).     • insulin aspart (novoLOG) 100 UNIT/ML injection Inject 10 Units under the skin 3 (Three) Times a Day With Meals.  12   • insulin detemir (LEVEMIR) 100 UNIT/ML injection Inject 25 Units under the skin Every Night.  12   • ipratropium-albuterol (DUO-NEB) 0.5-2.5 mg/mL nebulizer Take 3 mL by nebulization Every 4 (Four) Hours As Needed for Shortness of Air. 360 mL    • linagliptin (TRADJENTA) 5 MG tablet tablet Take 5 mg by mouth Daily.     • loperamide (IMODIUM) 2 MG capsule Take 1 capsule by mouth 3 (Three) Times a Day As Needed for Diarrhea.     • omeprazole (priLOSEC) 20 MG capsule Take 20 mg by mouth Daily.     • rivaroxaban (XARELTO) 15 MG tablet Take 1 tablet by mouth Daily With Dinner. 42 tablet    • rivastigmine (EXELON) 9.5 MG/24HR patch Place 1 patch on the skin Daily.     • valsartan (DIOVAN) 80 MG tablet Take 1 tablet by mouth Daily.     • vitamin D (ERGOCALCIFEROL) 74462 units capsule capsule  Take 50,000 Units by mouth 1 (One) Time Per Week.       No current facility-administered medications on file prior to visit.        Allergies   Allergen Reactions   • Codeine    • Metformin And Related        Objective:  Vitals:    11/27/17 1405   BP: 113/61   Pulse: 71   SpO2: 93%     Comfortable NAD  PERRL, conjunctiva clear  Neck supple, no JVD or thyromegaly appreciated  S1/S2 RRR, no m/r/g  Lungs CTA B, normal effort  Abdomen S/NT/ND (+) BS, no HSM appreciated  Extremities warm, no clubbing, cyanosis, 1-2+edema  Normal gait  No visible or palpable skin lesions  A/Ox4, mood and affect appropriate  Pulse exam: Rainer's:    DATA:       Results for orders placed during the hospital encounter of 10/26/17   Adult Transthoracic Echo Complete W/ Cont if Necessary Per Protocol    Narrative · The left ventricular cavity is borderline dilated.  · Left ventricular systolic function is mildly decreased. Estimated EF =   40%. The LAD territory is severely hypokinetic  · Mild to moderate tricuspid valve regurgitation is present.                           ECG 12 Lead  Date/Time: 11/27/2017 1:53 PM  Performed by: ZAINAB DUBON  Authorized by: ZAINAB DUBON               A/P:  CAD: Having some angina. Add imdur  CHF: increase lasix for edema. Increase lasix to 40mg daily    Thank you for allowing me to participate in the care of Floridalma Jenna Annette. Feel free to contact me directly with any further questions or concerns.

## 2017-12-03 ENCOUNTER — HOSPITAL ENCOUNTER (OUTPATIENT)
Dept: RESPIRATORY THERAPY | Facility: HOSPITAL | Age: 82
Discharge: HOME | End: 2017-12-03

## 2017-12-03 DIAGNOSIS — R06.02 SHORTNESS OF BREATH: ICD-10-CM

## 2017-12-15 ENCOUNTER — OFFICE VISIT (OUTPATIENT)
Dept: CARDIOLOGY | Facility: CLINIC | Age: 82
End: 2017-12-15

## 2017-12-15 ENCOUNTER — LAB (OUTPATIENT)
Dept: LAB | Facility: HOSPITAL | Age: 82
End: 2017-12-15
Attending: INTERNAL MEDICINE

## 2017-12-15 VITALS
OXYGEN SATURATION: 93 % | HEART RATE: 69 BPM | HEIGHT: 59 IN | DIASTOLIC BLOOD PRESSURE: 61 MMHG | SYSTOLIC BLOOD PRESSURE: 124 MMHG

## 2017-12-15 DIAGNOSIS — I21.4 NSTEMI (NON-ST ELEVATED MYOCARDIAL INFARCTION) (HCC): ICD-10-CM

## 2017-12-15 DIAGNOSIS — I45.10 RBBB: Chronic | ICD-10-CM

## 2017-12-15 DIAGNOSIS — I10 ESSENTIAL HYPERTENSION: Primary | Chronic | ICD-10-CM

## 2017-12-15 LAB
ANION GAP SERPL CALCULATED.3IONS-SCNC: 8.7 MMOL/L (ref 3.6–11.2)
BUN BLD-MCNC: 14 MG/DL (ref 7–21)
BUN/CREAT SERPL: 13.9 (ref 7–25)
CALCIUM SPEC-SCNC: 8.9 MG/DL (ref 7.7–10)
CHLORIDE SERPL-SCNC: 104 MMOL/L (ref 99–112)
CO2 SERPL-SCNC: 31.3 MMOL/L (ref 24.3–31.9)
CREAT BLD-MCNC: 1.01 MG/DL (ref 0.43–1.29)
GFR SERPL CREATININE-BSD FRML MDRD: 52 ML/MIN/1.73
GLUCOSE BLD-MCNC: 130 MG/DL (ref 70–110)
OSMOLALITY SERPL CALC.SUM OF ELEC: 289.1 MOSM/KG (ref 273–305)
POTASSIUM BLD-SCNC: 3.6 MMOL/L (ref 3.5–5.3)
SODIUM BLD-SCNC: 144 MMOL/L (ref 135–153)

## 2017-12-15 PROCEDURE — 80048 BASIC METABOLIC PNL TOTAL CA: CPT

## 2017-12-15 PROCEDURE — 36415 COLL VENOUS BLD VENIPUNCTURE: CPT

## 2017-12-15 PROCEDURE — 99213 OFFICE O/P EST LOW 20 MIN: CPT | Performed by: INTERNAL MEDICINE

## 2017-12-15 RX ORDER — ACETAMINOPHEN 650 MG/1
650 SUPPOSITORY RECTAL EVERY 4 HOURS PRN
COMMUNITY
End: 2018-11-15

## 2017-12-15 NOTE — PROGRESS NOTES
[facility] CARDIOLOGY  [address]  [place] [address]  Phone: [phone]  Fax: [phone]    12/15/2017    Chief Complaint: Routine followup    History:   Floridalma Bryant is a 83 y.o. female seen in followup, pt with CAD, NSTEMI and advanced dementia and WC bound or walker. Edema down since last visit with increase in lasix to 40mg daily. No further CP with imdur added.    Past Medical History:   Diagnosis Date   • Dementia    • Dementia    • Diabetes mellitus    • Hypertension    • Pancreatitis        Past Social History:  Social History     Social History   • Marital status:      Spouse name: N/A   • Number of children: N/A   • Years of education: N/A     Social History Main Topics   • Smoking status: Never Smoker   • Smokeless tobacco: Never Used   • Alcohol use No   • Drug use: No   • Sexual activity: Defer     Other Topics Concern   • None     Social History Narrative       Past Family History:  History reviewed. No pertinent family history.    Review of Systems:   Please see HPI  Constitution: No chills, no rigors, no unexplained weight loss or weight gain  Eyes:  No diplopia, no blurred vision, no loss of vision, conjunctiva is pink and sclera is anicteric  ENT:  No tinnitus, no otorrhea, no epistaxis, no sore throat   Respiratory: No cough, no hemoptysis  Cardiovascular: see HPI  Gastrointestinal: No nausea, no vomiting, no hematemesis, no diarrhea or constipation, no melena  Genitourinary: No frequency of dysuria no hematuria  Integument: No pruritis and  no skin rash  Hematologic / Lymphatic: No excessive bleeding, easy bruising, fatigue, lymphadenopathy and petechiae  Musculoskeletal: No joint pain, joint stiffness, joint swelling, muscle pain, muscle weakness and neck pain  Neurological: No dizziness, headaches, light headedness, seizures and vertigo  Endocrine: No frequent urination and nocturia, temperature intolerance, weight gain, unintended and weight  loss, unintended      Current Outpatient Prescriptions on File Prior to Visit   Medication Sig Dispense Refill   • acetaminophen (TYLENOL) 325 MG tablet Take 2 tablets by mouth Every 4 (Four) Hours As Needed for Headache or Fever (temperature greater than 101.5 F).     • amiodarone (PACERONE) 200 MG tablet Take 1 tablet by mouth 3 (Three) Times a Day. Continue till 11/11/17 then change to BID     • atorvastatin (LIPITOR) 80 MG tablet Take 1 tablet by mouth Every Night.     • carvedilol (COREG) 6.25 MG tablet Take 1 tablet by mouth 2 (Two) Times a Day With Meals.     • cefuroxime (CEFTIN) 250 MG tablet Take 250 mg by mouth 2 (Two) Times a Day.     • clopidogrel (PLAVIX) 75 MG tablet Take 1 tablet by mouth Daily. 30 tablet    • docusate calcium (SURFAK) 240 MG capsule Take 240 mg by mouth 2 (Two) Times a Day.     • ferrous sulfate 325 (65 FE) MG tablet Take 325 mg by mouth Daily With Breakfast.     • Fexofenadine HCl (ALLEGRA PO) Take 180 mg by mouth 2 (Two) Times a Day.     • furosemide (LASIX) 20 MG tablet Take 2 tablets by mouth Daily. 30 tablet 3   • guaiFENesin (ROBITUSSIN) 100 MG/5ML solution oral solution Take 10 mL by mouth Every 6 (Six) Hours As Needed (cough and congestion).     • hydrocortisone 0.5 % cream Apply  topically 2 (Two) Times a Day.     • insulin aspart (novoLOG) 100 UNIT/ML injection Inject 10 Units under the skin 3 (Three) Times a Day With Meals.  12   • insulin detemir (LEVEMIR) 100 UNIT/ML injection Inject 25 Units under the skin Every Night.  12   • ipratropium-albuterol (DUO-NEB) 0.5-2.5 mg/mL nebulizer Take 3 mL by nebulization Every 4 (Four) Hours As Needed for Shortness of Air. 360 mL    • isosorbide mononitrate (IMDUR) 30 MG 24 hr tablet Take 1 tablet by mouth Daily. 30 tablet 11   • linagliptin (TRADJENTA) 5 MG tablet tablet Take 5 mg by mouth Daily.     • loperamide (IMODIUM) 2 MG capsule Take 1 capsule by mouth 3 (Three) Times a Day As Needed for Diarrhea.     • nystatin (MYCOSTATIN)  164870 UNIT/GM cream Apply  topically 2 (Two) Times a Day.     • omeprazole (priLOSEC) 20 MG capsule Take 20 mg by mouth Daily.     • rivaroxaban (XARELTO) 15 MG tablet Take 1 tablet by mouth Daily With Dinner. 42 tablet    • rivastigmine (EXELON) 9.5 MG/24HR patch Place 1 patch on the skin Daily.     • valsartan (DIOVAN) 80 MG tablet Take 1 tablet by mouth Daily.     • vitamin D (ERGOCALCIFEROL) 40122 units capsule capsule Take 50,000 Units by mouth 1 (One) Time Per Week.       No current facility-administered medications on file prior to visit.        Allergies   Allergen Reactions   • Codeine    • Metformin And Related        Objective:  Vitals:    12/15/17 1128   BP: 124/61   Pulse: 69   SpO2: 93%     Comfortable NAD  PERRL, conjunctiva clear  Neck supple, no JVD or thyromegaly appreciated  S1/S2 RRR, no m/r/g  Lungs CTA B, normal effort  Abdomen S/NT/ND (+) BS, no HSM appreciated  Extremities warm, no clubbing, cyanosis, or edema  Normal gait  No visible or palpable skin lesions  A/Ox4, mood and affect appropriate  Pulse exam: Rainer's:    DATA:       Results for orders placed during the hospital encounter of 10/26/17   Adult Transthoracic Echo Complete W/ Cont if Necessary Per Protocol    Narrative · The left ventricular cavity is borderline dilated.  · Left ventricular systolic function is mildly decreased. Estimated EF =   40%. The LAD territory is severely hypokinetic  · Mild to moderate tricuspid valve regurgitation is present.                           A/P:CHF: euvolemic. Change lasix to 40mg QOD and prn edema  CAD: stable asymptomatic.   Afib: Xarelto, Amiodarone. COnsider DC amiodarone at next visit.  Check BMP      Thank you for allowing me to participate in the care of Floridalma Bryant. Feel free to contact me directly with any further questions or concerns.

## 2017-12-19 ENCOUNTER — HOSPITAL ENCOUNTER (OUTPATIENT)
Dept: GENERAL RADIOLOGY | Facility: HOSPITAL | Age: 82
Discharge: HOME | End: 2017-12-19
Admitting: NURSE PRACTITIONER

## 2017-12-19 DIAGNOSIS — J69.0 ASPIRATION PNEUMONIA, UNSPECIFIED ASPIRATION PNEUMONIA TYPE, UNSPECIFIED LATERALITY, UNSPECIFIED PART OF LUNG (HCC): ICD-10-CM

## 2017-12-19 PROCEDURE — 71020 XR CHEST 2 VW: CPT | Performed by: RADIOLOGY

## 2017-12-19 PROCEDURE — 71020 HC CHEST PA AND LATERAL: CPT

## 2017-12-20 ENCOUNTER — APPOINTMENT (OUTPATIENT)
Dept: RESPIRATORY THERAPY | Facility: HOSPITAL | Age: 82
End: 2017-12-20

## 2017-12-29 ENCOUNTER — OFFICE VISIT (OUTPATIENT)
Dept: PULMONOLOGY | Facility: CLINIC | Age: 82
End: 2017-12-29

## 2017-12-29 VITALS
OXYGEN SATURATION: 96 % | HEART RATE: 67 BPM | SYSTOLIC BLOOD PRESSURE: 110 MMHG | WEIGHT: 180 LBS | BODY MASS INDEX: 36.29 KG/M2 | DIASTOLIC BLOOD PRESSURE: 58 MMHG | HEIGHT: 59 IN | TEMPERATURE: 98 F

## 2017-12-29 DIAGNOSIS — R09.02 HYPOXIA: ICD-10-CM

## 2017-12-29 DIAGNOSIS — R06.02 SHORTNESS OF BREATH: Primary | ICD-10-CM

## 2017-12-29 DIAGNOSIS — R91.8 PULMONARY NODULES: ICD-10-CM

## 2017-12-29 PROCEDURE — 99214 OFFICE O/P EST MOD 30 MIN: CPT | Performed by: INTERNAL MEDICINE

## 2017-12-29 NOTE — PROGRESS NOTES
Interval history since last visit:    Recent hospitalizations:    Investigations (imaging, PFT's, labs, sleep study, record requests, etc.)    Have you had the Influenza Vaccine? yes    Would you like to receive this Vaccine today? no    Have you had the Pneumonia Vaccine?  yes   Would you like to receive this Vaccine today? no    Subjective    Floridalma Bryant presents for the following Cough (productive) and Shortness of Breath  .    History of Present Illness     Ms. Bryant is here to follow up on shortness of breath.  Her breathing continues to improve.  Chest xray has improved and was discussed with family in detail.  Will repeat CT scan at the end of January.    Review of Systems   Constitutional: Negative for appetite change, chills, diaphoresis and unexpected weight change.   HENT: Negative for sore throat, trouble swallowing and voice change.    Eyes: Negative for visual disturbance.   Respiratory: Positive for cough, chest tightness, shortness of breath and wheezing. Negative for apnea and choking.    Cardiovascular: Negative for chest pain, palpitations and leg swelling.   Gastrointestinal: Negative for abdominal pain, constipation, diarrhea, nausea and vomiting.   Endocrine: Negative for cold intolerance, heat intolerance, polydipsia, polyphagia and polyuria.   Genitourinary: Negative for difficulty urinating and dysuria.   Musculoskeletal: Negative for gait problem.   Skin: Negative for rash and wound.   Neurological: Negative for syncope and light-headedness.   Hematological: Negative for adenopathy.   Psychiatric/Behavioral: Negative for agitation, behavioral problems and confusion.   All other systems reviewed and are negative.      Active Problems:  Problem List Items Addressed This Visit        Respiratory    Pulmonary nodules    Shortness of breath - Primary    Hypoxia          Past Medical History:  Past Medical History:   Diagnosis Date   • Dementia    • Dementia    • Diabetes mellitus    •  Hypertension    • Pancreatitis        Family History:  History reviewed. No pertinent family history.    Social History:  Social History   Substance Use Topics   • Smoking status: Never Smoker   • Smokeless tobacco: Never Used   • Alcohol use No       Current Medications:  Current Outpatient Prescriptions   Medication Sig Dispense Refill   • acetaminophen (TYLENOL) 325 MG tablet Take 2 tablets by mouth Every 4 (Four) Hours As Needed for Headache or Fever (temperature greater than 101.5 F).     • acetaminophen (TYLENOL) 650 MG suppository Insert 650 mg into the rectum Every 4 (Four) Hours As Needed for Mild Pain .     • amiodarone (PACERONE) 200 MG tablet Take 1 tablet by mouth 3 (Three) Times a Day. Continue till 11/11/17 then change to BID     • atorvastatin (LIPITOR) 80 MG tablet Take 1 tablet by mouth Every Night.     • carvedilol (COREG) 6.25 MG tablet Take 1 tablet by mouth 2 (Two) Times a Day With Meals.     • clopidogrel (PLAVIX) 75 MG tablet Take 1 tablet by mouth Daily. 30 tablet    • docusate calcium (SURFAK) 240 MG capsule Take 240 mg by mouth 2 (Two) Times a Day.     • ferrous sulfate 325 (65 FE) MG tablet Take 325 mg by mouth Daily With Breakfast.     • Fexofenadine HCl (ALLEGRA PO) Take 180 mg by mouth 2 (Two) Times a Day.     • furosemide (LASIX) 20 MG tablet Take 2 tablets by mouth Daily. (Patient taking differently: Take 40 mg by mouth Every Other Day As Needed.) 30 tablet 3   • hydrocortisone 0.5 % cream Apply  topically 2 (Two) Times a Day.     • insulin aspart (novoLOG) 100 UNIT/ML injection Inject 10 Units under the skin 3 (Three) Times a Day With Meals.  12   • insulin detemir (LEVEMIR) 100 UNIT/ML injection Inject 25 Units under the skin Every Night.  12   • ipratropium-albuterol (DUO-NEB) 0.5-2.5 mg/mL nebulizer Take 3 mL by nebulization Every 4 (Four) Hours As Needed for Shortness of Air. 360 mL    • isosorbide mononitrate (IMDUR) 30 MG 24 hr tablet Take 1 tablet by mouth Daily. 30 tablet 11  "  • linagliptin (TRADJENTA) 5 MG tablet tablet Take 5 mg by mouth Daily.     • loperamide (IMODIUM) 2 MG capsule Take 1 capsule by mouth 3 (Three) Times a Day As Needed for Diarrhea.     • nystatin (MYCOSTATIN) 214488 UNIT/GM cream Apply  topically 2 (Two) Times a Day.     • omeprazole (priLOSEC) 20 MG capsule Take 20 mg by mouth Daily.     • rivaroxaban (XARELTO) 15 MG tablet Take 1 tablet by mouth Daily With Dinner. 42 tablet    • rivastigmine (EXELON) 9.5 MG/24HR patch Place 1 patch on the skin Daily.     • valsartan (DIOVAN) 80 MG tablet Take 1 tablet by mouth Daily.     • vitamin D (ERGOCALCIFEROL) 98807 units capsule capsule Take 50,000 Units by mouth 1 (One) Time Per Week.     • cefuroxime (CEFTIN) 250 MG tablet Take 250 mg by mouth 2 (Two) Times a Day.     • guaiFENesin (ROBITUSSIN) 100 MG/5ML solution oral solution Take 10 mL by mouth Every 6 (Six) Hours As Needed (cough and congestion).       No current facility-administered medications for this visit.        Allergies:  Allergies   Allergen Reactions   • Codeine    • Metformin And Related        Vitals:  /58 (BP Location: Right arm, Patient Position: Sitting)  Pulse 67  Temp 98 °F (36.7 °C)  Ht 149.9 cm (59.02\")  Wt 81.6 kg (180 lb) Comment: per care giver acct  SpO2 96%  BMI 36.34 kg/m2    Imaging:    Imaging Results (most recent)     None          Pulmonary Functions Testing Results:    No results found for: FEV1, FVC, LGC6VED, TLC, DLCO    Results for orders placed or performed in visit on 12/15/17   Basic Metabolic Panel   Result Value Ref Range    Glucose 130 (H) 70 - 110 mg/dL    BUN 14 7 - 21 mg/dL    Creatinine 1.01 0.43 - 1.29 mg/dL    Sodium 144 135 - 153 mmol/L    Potassium 3.6 3.5 - 5.3 mmol/L    Chloride 104 99 - 112 mmol/L    CO2 31.3 24.3 - 31.9 mmol/L    Calcium 8.9 7.7 - 10.0 mg/dL    eGFR Non African Amer 52 (L) >60 mL/min/1.73    BUN/Creatinine Ratio 13.9 7.0 - 25.0    Anion Gap 8.7 3.6 - 11.2 mmol/L   Osmolality, Calculated "   Result Value Ref Range    Osmolality Calc 289.1 273.0 - 305.0 mOsm/kg       Objective   Physical Exam     GENERAL APPEARANCE: Well developed, well nourished, alert and cooperative, and appears to be in no acute distress.    HEAD: normocephalic.    EYES: PERRL, EOMI. Fundi normal, vision is grossly intact.    THROAT: Oral cavity and pharynx normal. No inflammation, swelling, exudate, or lesions.     NECK: Neck supple.     CARDIAC: Normal S1 and S2. No S3, S4 or murmurs. Rhythm is regular. There is no peripheral edema, cyanosis or pallor. Extremities are warm and well perfused. Capillary refill is less than 2 seconds. No carotid bruits.    RESPIRATORY: Clear to auscultation without rales, rhonchi, wheezing or diminished breath sounds.    GI: Positive bowel sounds. Soft, nondistended, nontender.     MUSCULOSKELETAL: No significant deformity or joint abnormality. No edema. Peripheral pulses intact. No varicosities.    NEUROLOGICAL: Strength and sensation symmetric and intact throughout.     PSYCHIATRIC: The mental examination revealed the patient was oriented to person, place, and time.       Assessment/Plan  r    Aspiration Pneumonia: repeat xray shows almost complete clearance of opacities.  Continue pureed diet and universal aspiration precautions.    Multiple Pulmonary nodules:  Will repeat CT scan at the end of January to assess lung nodules.    Family does not want to do any invasive procedures for further investigation.  They state that if it were to be cancer that they would not want to treat it due to her dementia, age and other co morbidities.           Hypoxia: uses oxygen when she is ambulating.  Continue supplemental oxygen as needed.    Educated patient on the importance of wearing oxygen as prescribed, otherwise could become hypoxic, faint, fall, hit his/her head, cause a brain hemorrhage, and potentially die.      Shortness of breath:  Likely related to recent pneumonia, deconditioning, body habitus  and heart disease.  Recent STEMI, being followed closely by cardiology.  Shortness of breath continue to improve.  Will continue rehab.  PFT shows in progress.  patient was unable to do test.  Continue current inhalers.    STEMI: October 2017.  Is being followed by cardiology.  Being treated only with Plavix and Xarelto, as family chose not to do any intervention due to her age, dementia and other comorbidities.    Scribed for Dr. Nash, by LILY Marrero        ICD-10-CM ICD-9-CM   1. Shortness of breath R06.02 786.05   2. Hypoxia R09.02 799.02   3. Pulmonary nodules R91.8 793.19       Return in about 2 months (around 2/28/2018).          Wesley MARTIN M.D. attest that the above note accurately reflects the work and decisions made  by me.  Patient was seen and evaluated by Dr. Nash, including history of present illness, physical exam, assessment, and treatment plan.  The above note was reviewed and edited by Dr. Nash.

## 2018-01-05 ENCOUNTER — HOSPITAL ENCOUNTER (OUTPATIENT)
Dept: CT IMAGING | Facility: HOSPITAL | Age: 83
Discharge: HOME | End: 2018-01-05
Admitting: NURSE PRACTITIONER

## 2018-01-05 DIAGNOSIS — R91.8 PULMONARY NODULES: ICD-10-CM

## 2018-01-05 PROCEDURE — 71250 CT THORAX DX C-: CPT

## 2018-01-05 PROCEDURE — 71250 CT THORAX DX C-: CPT | Performed by: RADIOLOGY

## 2018-01-10 ENCOUNTER — OFFICE VISIT (OUTPATIENT)
Dept: CARDIOLOGY | Facility: CLINIC | Age: 83
End: 2018-01-10

## 2018-01-10 VITALS
HEIGHT: 59 IN | DIASTOLIC BLOOD PRESSURE: 56 MMHG | OXYGEN SATURATION: 95 % | HEART RATE: 60 BPM | SYSTOLIC BLOOD PRESSURE: 104 MMHG

## 2018-01-10 DIAGNOSIS — I21.4 NSTEMI (NON-ST ELEVATED MYOCARDIAL INFARCTION) (HCC): ICD-10-CM

## 2018-01-10 DIAGNOSIS — I10 ESSENTIAL HYPERTENSION: Primary | Chronic | ICD-10-CM

## 2018-01-10 DIAGNOSIS — I45.10 RBBB: Chronic | ICD-10-CM

## 2018-01-10 PROCEDURE — 99213 OFFICE O/P EST LOW 20 MIN: CPT | Performed by: INTERNAL MEDICINE

## 2018-01-10 PROCEDURE — 93000 ELECTROCARDIOGRAM COMPLETE: CPT | Performed by: INTERNAL MEDICINE

## 2018-01-10 RX ORDER — BUSPIRONE HYDROCHLORIDE 10 MG/1
10 TABLET ORAL
Status: ON HOLD | COMMUNITY
End: 2019-02-06

## 2018-01-10 RX ORDER — RIVASTIGMINE 9.5 MG/24H
1 PATCH, EXTENDED RELEASE TRANSDERMAL DAILY
Status: ON HOLD | COMMUNITY
End: 2019-02-06

## 2018-01-10 NOTE — PROGRESS NOTES
[facility] CARDIOLOGY  [address]  [place] [address]  Phone: [phone]  Fax: [phone]    01/10/2018    Chief Complaint: Routine followup    History:   Floridalma Bryant is a 83 y.o. female seen in followup,     At last visit:    A/P:CHF: euvolemic. Change lasix to 40mg QOD and prn edema  CAD: stable asymptomatic.   Afib: Xarelto, Amiodarone. COnsider DC amiodarone at next visit.  Check BMP    Since visit pt stable. No new recent events. EKG NSR.     Past Medical History:   Diagnosis Date   • Dementia    • Dementia    • Diabetes mellitus    • Hypertension    • Pancreatitis        Past Social History:  Social History     Social History   • Marital status:      Spouse name: N/A   • Number of children: N/A   • Years of education: N/A     Social History Main Topics   • Smoking status: Never Smoker   • Smokeless tobacco: Never Used   • Alcohol use No   • Drug use: No   • Sexual activity: Defer     Other Topics Concern   • None     Social History Narrative       Past Family History:  No family history on file.    Review of Systems:   Please see HPI  Constitution: No chills, no rigors, no unexplained weight loss or weight gain  Eyes:  No diplopia, no blurred vision, no loss of vision, conjunctiva is pink and sclera is anicteric  ENT:  No tinnitus, no otorrhea, no epistaxis, no sore throat   Respiratory: No cough, no hemoptysis  Cardiovascular: see HPI  Gastrointestinal: No nausea, no vomiting, no hematemesis, no diarrhea or constipation, no melena  Genitourinary: No frequency of dysuria no hematuria  Integument: No pruritis and  no skin rash  Hematologic / Lymphatic: No excessive bleeding, easy bruising, fatigue, lymphadenopathy and petechiae  Musculoskeletal: No joint pain, joint stiffness, joint swelling, muscle pain, muscle weakness and neck pain  Neurological: No dizziness, headaches, light headedness, seizures and vertigo  Endocrine: No frequent urination and nocturia,  temperature intolerance, weight gain, unintended and weight loss, unintended      Current Outpatient Prescriptions on File Prior to Visit   Medication Sig Dispense Refill   • acetaminophen (TYLENOL) 325 MG tablet Take 2 tablets by mouth Every 4 (Four) Hours As Needed for Headache or Fever (temperature greater than 101.5 F).     • acetaminophen (TYLENOL) 650 MG suppository Insert 650 mg into the rectum Every 4 (Four) Hours As Needed for Mild Pain .     • amiodarone (PACERONE) 200 MG tablet Take 1 tablet by mouth 3 (Three) Times a Day. Continue till 11/11/17 then change to BID     • atorvastatin (LIPITOR) 80 MG tablet Take 1 tablet by mouth Every Night.     • carvedilol (COREG) 6.25 MG tablet Take 1 tablet by mouth 2 (Two) Times a Day With Meals.     • cefuroxime (CEFTIN) 250 MG tablet Take 250 mg by mouth 2 (Two) Times a Day.     • clopidogrel (PLAVIX) 75 MG tablet Take 1 tablet by mouth Daily. 30 tablet    • docusate calcium (SURFAK) 240 MG capsule Take 240 mg by mouth 2 (Two) Times a Day.     • ferrous sulfate 325 (65 FE) MG tablet Take 325 mg by mouth Daily With Breakfast.     • Fexofenadine HCl (ALLEGRA PO) Take 180 mg by mouth 2 (Two) Times a Day.     • furosemide (LASIX) 20 MG tablet Take 2 tablets by mouth Daily. (Patient taking differently: Take 40 mg by mouth Every Other Day As Needed.) 30 tablet 3   • guaiFENesin (ROBITUSSIN) 100 MG/5ML solution oral solution Take 10 mL by mouth Every 6 (Six) Hours As Needed (cough and congestion).     • hydrocortisone 0.5 % cream Apply  topically 2 (Two) Times a Day.     • insulin aspart (novoLOG) 100 UNIT/ML injection Inject 10 Units under the skin 3 (Three) Times a Day With Meals.  12   • insulin detemir (LEVEMIR) 100 UNIT/ML injection Inject 25 Units under the skin Every Night.  12   • ipratropium-albuterol (DUO-NEB) 0.5-2.5 mg/mL nebulizer Take 3 mL by nebulization Every 4 (Four) Hours As Needed for Shortness of Air. 360 mL    • isosorbide mononitrate (IMDUR) 30 MG 24  hr tablet Take 1 tablet by mouth Daily. 30 tablet 11   • linagliptin (TRADJENTA) 5 MG tablet tablet Take 5 mg by mouth Daily.     • loperamide (IMODIUM) 2 MG capsule Take 1 capsule by mouth 3 (Three) Times a Day As Needed for Diarrhea.     • nystatin (MYCOSTATIN) 718134 UNIT/GM cream Apply  topically 2 (Two) Times a Day.     • omeprazole (priLOSEC) 20 MG capsule Take 20 mg by mouth Daily.     • rivaroxaban (XARELTO) 15 MG tablet Take 1 tablet by mouth Daily With Dinner. 42 tablet    • rivastigmine (EXELON) 9.5 MG/24HR patch Place 1 patch on the skin Daily.     • valsartan (DIOVAN) 80 MG tablet Take 1 tablet by mouth Daily.     • vitamin D (ERGOCALCIFEROL) 87529 units capsule capsule Take 50,000 Units by mouth 1 (One) Time Per Week.       No current facility-administered medications on file prior to visit.        Allergies   Allergen Reactions   • Codeine    • Metformin And Related        Objective:  Vitals:    01/10/18 1445   BP: 104/56   Pulse: 60   SpO2: 95%     Comfortable NAD  PERRL, conjunctiva clear  Neck supple, no JVD or thyromegaly appreciated  S1/S2 RRR, no m/r/g  Lungs CTA B, normal effort  Abdomen S/NT/ND (+) BS, no HSM appreciated  Extremities warm, no clubbing, cyanosis, or edema  Normal gait  No visible or palpable skin lesions  A/Ox4, mood and affect appropriate  Pulse exam: Rainer's:    DATA:       Results for orders placed during the hospital encounter of 10/26/17   Adult Transthoracic Echo Complete W/ Cont if Necessary Per Protocol    Narrative · The left ventricular cavity is borderline dilated.  · Left ventricular systolic function is mildly decreased. Estimated EF =   40%. The LAD territory is severely hypokinetic  · Mild to moderate tricuspid valve regurgitation is present.                         I personally viewed and interpreted the patient's EKG:      A/P:    A/P:CHF: euvolemic. Maintain lasix to 40mg QOD and prn edema. Labs OK  CAD: stable asymptomatic.   Afib: XareltoEDSON Amiodarone.  Consider multaq if recurs    F/u 6months    Thank you for allowing me to participate in the care of Floridalma Bryant. Feel free to contact me directly with any further questions or concerns.

## 2018-03-27 ENCOUNTER — LAB REQUISITION (OUTPATIENT)
Dept: LAB | Facility: HOSPITAL | Age: 83
End: 2018-03-27

## 2018-03-27 DIAGNOSIS — Z00.00 ROUTINE GENERAL MEDICAL EXAMINATION AT A HEALTH CARE FACILITY: ICD-10-CM

## 2018-03-27 LAB
BACTERIA UR QL AUTO: ABNORMAL /HPF
BILIRUB UR QL STRIP: NEGATIVE
CLARITY UR: ABNORMAL
COLOR UR: YELLOW
GLUCOSE UR STRIP-MCNC: NEGATIVE MG/DL
HGB UR QL STRIP.AUTO: NEGATIVE
HYALINE CASTS UR QL AUTO: ABNORMAL /LPF
KETONES UR QL STRIP: NEGATIVE
LEUKOCYTE ESTERASE UR QL STRIP.AUTO: ABNORMAL
NITRITE UR QL STRIP: POSITIVE
PH UR STRIP.AUTO: <=5 [PH] (ref 5–8)
PROT UR QL STRIP: NEGATIVE
RBC # UR: ABNORMAL /HPF
REF LAB TEST METHOD: ABNORMAL
SP GR UR STRIP: 1.02 (ref 1–1.03)
SQUAMOUS #/AREA URNS HPF: ABNORMAL /HPF
UROBILINOGEN UR QL STRIP: ABNORMAL
WBC UR QL AUTO: ABNORMAL /HPF

## 2018-03-27 PROCEDURE — 81001 URINALYSIS AUTO W/SCOPE: CPT | Performed by: INTERNAL MEDICINE

## 2018-04-16 ENCOUNTER — APPOINTMENT (OUTPATIENT)
Dept: GENERAL RADIOLOGY | Facility: HOSPITAL | Age: 83
End: 2018-04-16

## 2018-04-16 ENCOUNTER — HOSPITAL ENCOUNTER (EMERGENCY)
Facility: HOSPITAL | Age: 83
Discharge: HOME | End: 2018-04-16
Attending: EMERGENCY MEDICINE | Admitting: EMERGENCY MEDICINE

## 2018-04-16 ENCOUNTER — APPOINTMENT (OUTPATIENT)
Dept: CT IMAGING | Facility: HOSPITAL | Age: 83
End: 2018-04-16

## 2018-04-16 VITALS
HEIGHT: 59 IN | TEMPERATURE: 98.2 F | WEIGHT: 165 LBS | RESPIRATION RATE: 18 BRPM | HEART RATE: 72 BPM | SYSTOLIC BLOOD PRESSURE: 158 MMHG | OXYGEN SATURATION: 99 % | DIASTOLIC BLOOD PRESSURE: 70 MMHG | BODY MASS INDEX: 33.26 KG/M2

## 2018-04-16 DIAGNOSIS — F02.81 ALZHEIMER'S DEMENTIA WITH BEHAVIORAL DISTURBANCE, UNSPECIFIED TIMING OF DEMENTIA ONSET: Primary | ICD-10-CM

## 2018-04-16 DIAGNOSIS — N30.90 CYSTITIS: ICD-10-CM

## 2018-04-16 DIAGNOSIS — R07.9 CHEST PAIN, UNSPECIFIED TYPE: ICD-10-CM

## 2018-04-16 DIAGNOSIS — G30.9 ALZHEIMER'S DEMENTIA WITH BEHAVIORAL DISTURBANCE, UNSPECIFIED TIMING OF DEMENTIA ONSET: Primary | ICD-10-CM

## 2018-04-16 LAB
ALBUMIN SERPL-MCNC: 4.3 G/DL (ref 3.4–4.8)
ALBUMIN/GLOB SERPL: 1.5 G/DL (ref 1.5–2.5)
ALP SERPL-CCNC: 84 U/L (ref 35–104)
ALT SERPL W P-5'-P-CCNC: 9 U/L (ref 10–36)
ANION GAP SERPL CALCULATED.3IONS-SCNC: 11 MMOL/L (ref 3.6–11.2)
AST SERPL-CCNC: 13 U/L (ref 10–30)
BACTERIA UR QL AUTO: ABNORMAL /HPF
BASOPHILS # BLD AUTO: 0.03 10*3/MM3 (ref 0–0.3)
BASOPHILS NFR BLD AUTO: 0.3 % (ref 0–2)
BILIRUB SERPL-MCNC: 0.7 MG/DL (ref 0.2–1.8)
BILIRUB UR QL STRIP: NEGATIVE
BUN BLD-MCNC: 19 MG/DL (ref 7–21)
BUN/CREAT SERPL: 20.2 (ref 7–25)
CALCIUM SPEC-SCNC: 9.6 MG/DL (ref 7.7–10)
CHLORIDE SERPL-SCNC: 102 MMOL/L (ref 99–112)
CLARITY UR: ABNORMAL
CO2 SERPL-SCNC: 27 MMOL/L (ref 24.3–31.9)
COLOR UR: YELLOW
CREAT BLD-MCNC: 0.94 MG/DL (ref 0.43–1.29)
DEPRECATED RDW RBC AUTO: 51.1 FL (ref 37–54)
EOSINOPHIL # BLD AUTO: 0.06 10*3/MM3 (ref 0–0.7)
EOSINOPHIL NFR BLD AUTO: 0.6 % (ref 0–7)
ERYTHROCYTE [DISTWIDTH] IN BLOOD BY AUTOMATED COUNT: 15.8 % (ref 11.5–14.5)
GFR SERPL CREATININE-BSD FRML MDRD: 57 ML/MIN/1.73
GLOBULIN UR ELPH-MCNC: 2.9 GM/DL
GLUCOSE BLD-MCNC: 217 MG/DL (ref 70–110)
GLUCOSE UR STRIP-MCNC: NEGATIVE MG/DL
HCT VFR BLD AUTO: 44.4 % (ref 37–47)
HGB BLD-MCNC: 14.6 G/DL (ref 12–16)
HGB UR QL STRIP.AUTO: NEGATIVE
HOLD SPECIMEN: NORMAL
HOLD SPECIMEN: NORMAL
HYALINE CASTS UR QL AUTO: ABNORMAL /LPF
IMM GRANULOCYTES # BLD: 0.02 10*3/MM3 (ref 0–0.03)
IMM GRANULOCYTES NFR BLD: 0.2 % (ref 0–0.5)
INR PPP: 1.72 (ref 0.9–1.1)
KETONES UR QL STRIP: NEGATIVE
LEUKOCYTE ESTERASE UR QL STRIP.AUTO: ABNORMAL
LYMPHOCYTES # BLD AUTO: 1.6 10*3/MM3 (ref 1–3)
LYMPHOCYTES NFR BLD AUTO: 15.4 % (ref 16–46)
MCH RBC QN AUTO: 29.4 PG (ref 27–33)
MCHC RBC AUTO-ENTMCNC: 32.9 G/DL (ref 33–37)
MCV RBC AUTO: 89.3 FL (ref 80–94)
MONOCYTES # BLD AUTO: 0.73 10*3/MM3 (ref 0.1–0.9)
MONOCYTES NFR BLD AUTO: 7 % (ref 0–12)
NEUTROPHILS # BLD AUTO: 7.94 10*3/MM3 (ref 1.4–6.5)
NEUTROPHILS NFR BLD AUTO: 76.5 % (ref 40–75)
NITRITE UR QL STRIP: POSITIVE
OSMOLALITY SERPL CALC.SUM OF ELEC: 288.2 MOSM/KG (ref 273–305)
PH UR STRIP.AUTO: <=5 [PH] (ref 5–8)
PLATELET # BLD AUTO: 226 10*3/MM3 (ref 130–400)
PMV BLD AUTO: 10.6 FL (ref 6–10)
POTASSIUM BLD-SCNC: 4.1 MMOL/L (ref 3.5–5.3)
PROT SERPL-MCNC: 7.2 G/DL (ref 6–8)
PROT UR QL STRIP: NEGATIVE
PROTHROMBIN TIME: 20.4 SECONDS (ref 11–15.4)
RBC # BLD AUTO: 4.97 10*6/MM3 (ref 4.2–5.4)
RBC # UR: ABNORMAL /HPF
REF LAB TEST METHOD: ABNORMAL
SODIUM BLD-SCNC: 140 MMOL/L (ref 135–153)
SP GR UR STRIP: 1.02 (ref 1–1.03)
SQUAMOUS #/AREA URNS HPF: ABNORMAL /HPF
TROPONIN I SERPL-MCNC: 0.01 NG/ML
TROPONIN I SERPL-MCNC: 0.01 NG/ML
UROBILINOGEN UR QL STRIP: ABNORMAL
WBC NRBC COR # BLD: 10.38 10*3/MM3 (ref 4.5–12.5)
WBC UR QL AUTO: ABNORMAL /HPF
WHOLE BLOOD HOLD SPECIMEN: NORMAL
WHOLE BLOOD HOLD SPECIMEN: NORMAL

## 2018-04-16 PROCEDURE — 36415 COLL VENOUS BLD VENIPUNCTURE: CPT

## 2018-04-16 PROCEDURE — 93005 ELECTROCARDIOGRAM TRACING: CPT | Performed by: EMERGENCY MEDICINE

## 2018-04-16 PROCEDURE — 70450 CT HEAD/BRAIN W/O DYE: CPT | Performed by: RADIOLOGY

## 2018-04-16 PROCEDURE — 25010000002 CEFTRIAXONE PER 250 MG: Performed by: PHYSICIAN ASSISTANT

## 2018-04-16 PROCEDURE — 85610 PROTHROMBIN TIME: CPT | Performed by: PHYSICIAN ASSISTANT

## 2018-04-16 PROCEDURE — 70450 CT HEAD/BRAIN W/O DYE: CPT

## 2018-04-16 PROCEDURE — 96365 THER/PROPH/DIAG IV INF INIT: CPT

## 2018-04-16 PROCEDURE — 85025 COMPLETE CBC W/AUTO DIFF WBC: CPT | Performed by: PHYSICIAN ASSISTANT

## 2018-04-16 PROCEDURE — 87186 SC STD MICRODIL/AGAR DIL: CPT | Performed by: PHYSICIAN ASSISTANT

## 2018-04-16 PROCEDURE — 99285 EMERGENCY DEPT VISIT HI MDM: CPT

## 2018-04-16 PROCEDURE — P9612 CATHETERIZE FOR URINE SPEC: HCPCS

## 2018-04-16 PROCEDURE — 93010 ELECTROCARDIOGRAM REPORT: CPT | Performed by: INTERNAL MEDICINE

## 2018-04-16 PROCEDURE — 71045 X-RAY EXAM CHEST 1 VIEW: CPT | Performed by: RADIOLOGY

## 2018-04-16 PROCEDURE — 84484 ASSAY OF TROPONIN QUANT: CPT | Performed by: PHYSICIAN ASSISTANT

## 2018-04-16 PROCEDURE — 81001 URINALYSIS AUTO W/SCOPE: CPT | Performed by: PHYSICIAN ASSISTANT

## 2018-04-16 PROCEDURE — 87086 URINE CULTURE/COLONY COUNT: CPT | Performed by: PHYSICIAN ASSISTANT

## 2018-04-16 PROCEDURE — 71045 X-RAY EXAM CHEST 1 VIEW: CPT

## 2018-04-16 PROCEDURE — 80053 COMPREHEN METABOLIC PANEL: CPT | Performed by: PHYSICIAN ASSISTANT

## 2018-04-16 PROCEDURE — 87077 CULTURE AEROBIC IDENTIFY: CPT | Performed by: PHYSICIAN ASSISTANT

## 2018-04-16 RX ORDER — FLUTICASONE PROPIONATE 50 MCG
2 SPRAY, SUSPENSION (ML) NASAL DAILY
Status: ON HOLD | COMMUNITY
End: 2019-02-06

## 2018-04-16 RX ORDER — AMOXICILLIN AND CLAVULANATE POTASSIUM 500; 125 MG/1; MG/1
1 TABLET, FILM COATED ORAL 3 TIMES DAILY
Qty: 30 TABLET | Refills: 0 | Status: SHIPPED | OUTPATIENT
Start: 2018-04-16 | End: 2018-11-15

## 2018-04-16 RX ORDER — LOSARTAN POTASSIUM 100 MG/1
100 TABLET ORAL DAILY
Status: ON HOLD | COMMUNITY
End: 2019-02-06

## 2018-04-16 RX ORDER — ASPIRIN 325 MG
325 TABLET ORAL ONCE
Status: COMPLETED | OUTPATIENT
Start: 2018-04-16 | End: 2018-04-16

## 2018-04-16 RX ORDER — CITALOPRAM 10 MG/1
20 TABLET ORAL DAILY
COMMUNITY

## 2018-04-16 RX ORDER — DONEPEZIL HYDROCHLORIDE 5 MG/1
5 TABLET, FILM COATED ORAL NIGHTLY
Status: ON HOLD | COMMUNITY
End: 2019-02-06

## 2018-04-16 RX ORDER — POTASSIUM CHLORIDE 750 MG/1
10 TABLET, FILM COATED, EXTENDED RELEASE ORAL 2 TIMES DAILY
COMMUNITY

## 2018-04-16 RX ORDER — SODIUM CHLORIDE 0.9 % (FLUSH) 0.9 %
10 SYRINGE (ML) INJECTION AS NEEDED
Status: DISCONTINUED | OUTPATIENT
Start: 2018-04-16 | End: 2018-04-16 | Stop reason: HOSPADM

## 2018-04-16 RX ORDER — HYDRALAZINE HYDROCHLORIDE 10 MG/1
10 TABLET, FILM COATED ORAL 3 TIMES DAILY
Status: ON HOLD | COMMUNITY
End: 2019-02-06

## 2018-04-16 RX ORDER — LORAZEPAM 0.5 MG/1
0.5 TABLET ORAL ONCE
Status: COMPLETED | OUTPATIENT
Start: 2018-04-16 | End: 2018-04-16

## 2018-04-16 RX ADMIN — CEFTRIAXONE 1 G: 1 INJECTION, SOLUTION INTRAVENOUS at 10:50

## 2018-04-16 RX ADMIN — LORAZEPAM 0.5 MG: 0.5 TABLET ORAL at 11:28

## 2018-04-16 RX ADMIN — ASPIRIN 325 MG: 325 TABLET ORAL at 09:53

## 2018-04-16 NOTE — ED NOTES
Straight catheterization performed at this time using sterile technique; pt cleansed with hospital wipes and betadine prior to insertion of size 8fr in and out catheter. Approximately 30mL of dark yellow urine noted; pt tolerated well.      Jayla Aldridge RN  04/16/18 1014

## 2018-04-16 NOTE — ED PROVIDER NOTES
Subjective     Mental Health Problem   Presenting symptoms: aggressive behavior and agitation    Presenting symptoms comment:  Patient has history of dementia.  She has been more combative over the past couple of days.  Patient had complained of chest discomfort on the way over from the nursing home.  She has been upset that she is here.  Onset quality:  Gradual  Duration:  2 days  Timing:  Constant  Progression:  Worsening  Chronicity:  Recurrent  Context comment:  Reports she usually gets like this when she has urinary tract infection.  No fever.  Associated symptoms: anxiety    Associated symptoms: no abdominal pain and no chest pain        Review of Systems   Constitutional: Negative.  Negative for fever.   HENT: Negative.    Respiratory: Negative.    Cardiovascular: Negative.  Negative for chest pain.   Gastrointestinal: Negative.  Negative for abdominal pain.   Endocrine: Negative.    Genitourinary: Negative.  Negative for dysuria.   Skin: Negative.    Neurological: Negative.    Psychiatric/Behavioral: Positive for agitation. The patient is nervous/anxious.    All other systems reviewed and are negative.      Past Medical History:   Diagnosis Date   • Dementia    • Dementia    • Diabetes mellitus    • Hypertension    • Pancreatitis        Allergies   Allergen Reactions   • Codeine    • Metformin And Related        Past Surgical History:   Procedure Laterality Date   • ANKLE SURGERY     • HYSTERECTOMY     • SHOULDER SURGERY     • TYMPANOPLASTY         History reviewed. No pertinent family history.    Social History     Social History   • Marital status:      Social History Main Topics   • Smoking status: Never Smoker   • Smokeless tobacco: Never Used   • Alcohol use No   • Drug use: No   • Sexual activity: Defer     Other Topics Concern   • Not on file           Objective   Physical Exam   Constitutional: She appears well-developed and well-nourished. No distress.   HENT:   Head: Normocephalic and  atraumatic.   Right Ear: External ear normal.   Left Ear: External ear normal.   Nose: Nose normal.   Eyes: Conjunctivae and EOM are normal. Pupils are equal, round, and reactive to light.   Neck: Normal range of motion. Neck supple. No JVD present. No tracheal deviation present.   Cardiovascular: Normal rate, regular rhythm and normal heart sounds.    No murmur heard.  Pulmonary/Chest: Effort normal and breath sounds normal. No respiratory distress. She has no wheezes.   Abdominal: Soft. Bowel sounds are normal. There is no tenderness.   Musculoskeletal: Normal range of motion. She exhibits no edema or deformity.   Neurological: She is alert. No cranial nerve deficit.   Profoundly hard of hearing.  Alert though confused   Skin: Skin is warm and dry. No rash noted. She is not diaphoretic. No erythema. No pallor.   Psychiatric: She has a normal mood and affect. Her behavior is normal. Thought content normal.   Nursing note and vitals reviewed.      Procedures         ED Course  ED Course   Value Comment By Time   WBC: 10.38 (Reviewed) KACI Stephen 04/16 1128                  [facility]  Number of Diagnoses or Management Options  Alzheimer's dementia with behavioral disturbance, unspecified timing of dementia onset: established and worsening  Chest pain, unspecified type: new and requires workup  Cystitis: new and requires workup     Amount and/or Complexity of Data Reviewed  Clinical lab tests: reviewed and ordered  Tests in the radiology section of CPT®: ordered and reviewed  Tests in the medicine section of CPT®: ordered and reviewed  Decide to obtain previous medical records or to obtain history from someone other than the patient: yes  Discuss the patient with other providers: yes  Independent visualization of images, tracings, or specimens: yes    Risk of Complications, Morbidity, and/or Mortality  Presenting problems: moderate        Final diagnoses:   Alzheimer's dementia with behavioral disturbance,  unspecified timing of dementia onset   Cystitis   Chest pain, unspecified type            KACI Stephen  04/16/18 [id number]

## 2018-04-17 NOTE — ED NOTES
"Patient daughter at bedside reports patient's problems began in October of 2017 after the pt had an MI, was diagnosed with \"double pneumonia\", a uti that was \"almost fatal\"; pt daughter reports the pt has frequent UTI's and believes that is the cause of pt aggressive behavior. Pt is noted to be disoriented to all but self. Patient daughter reports the patient had recently been in [facility] where they adjusted medications. Pt noted to have dr appointment tomorrow to see how adjustments are doing. Patient daughter reports the patient's worsening confusion started yesterday after she took the patient out to eat. Patient daughter states \"after we were done she kept asking for a menu because she couldn't remember we had just ate\"; patient daughter then reports the patient has became increasing belligerent. Daughter reports on the way over to the ED the patient was grasping her chest stating \"I'm having another heart attack\"; pt now denies chest pain       Jayla Aldridge RN  04/16/18 2008    "

## 2018-04-18 LAB
BACTERIA SPEC AEROBE CULT: ABNORMAL
BACTERIA SPEC AEROBE CULT: ABNORMAL

## 2018-05-09 ENCOUNTER — OFFICE VISIT (OUTPATIENT)
Dept: PULMONOLOGY | Facility: CLINIC | Age: 83
End: 2018-05-09

## 2018-05-09 VITALS
TEMPERATURE: 98 F | DIASTOLIC BLOOD PRESSURE: 64 MMHG | WEIGHT: 157.8 LBS | HEIGHT: 59 IN | OXYGEN SATURATION: 95 % | SYSTOLIC BLOOD PRESSURE: 123 MMHG | BODY MASS INDEX: 31.81 KG/M2 | HEART RATE: 75 BPM

## 2018-05-09 DIAGNOSIS — R06.02 SHORTNESS OF BREATH: Primary | ICD-10-CM

## 2018-05-09 DIAGNOSIS — F03.91 DEMENTIA WITH BEHAVIORAL DISTURBANCE, UNSPECIFIED DEMENTIA TYPE: Chronic | ICD-10-CM

## 2018-05-09 DIAGNOSIS — R09.02 HYPOXIA: ICD-10-CM

## 2018-05-09 DIAGNOSIS — R91.8 PULMONARY NODULES: ICD-10-CM

## 2018-05-09 PROCEDURE — 99214 OFFICE O/P EST MOD 30 MIN: CPT | Performed by: INTERNAL MEDICINE

## 2018-05-09 NOTE — PROGRESS NOTES
Interval history since last visit: Still coughing, drainage    Recent hospitalizations: ER Visit    Investigations (imaging, PFT's, labs, sleep study, record requests, etc.) None    Have you had the Influenza Vaccine? no   Would you like to receive this Vaccine today? no    Have you had the Pneumonia Vaccine?  no  Would you like to receive this Vaccine today? no    Subjective    Floridalma Bryant presents for the following Shortness of Breath      History of Present Illness     Ms. Bryant is here for a follow up on shortness of breath.  She states that her breathing is at baseline since her last visit.      Review of Systems   Constitutional: Negative for activity change, fatigue and unexpected weight change.   HENT: Negative for congestion, postnasal drip and rhinorrhea.    Respiratory: Positive for cough (Productive), shortness of breath (On exertion) and wheezing. Negative for apnea and chest tightness.    Cardiovascular: Negative for chest pain and palpitations.   Gastrointestinal: Negative for nausea.   Allergic/Immunologic: Negative for environmental allergies.   Psychiatric/Behavioral: Negative for agitation and confusion.       Active Problems:  Problem List Items Addressed This Visit        Respiratory    Pulmonary nodules    Shortness of breath - Primary    Hypoxia       Nervous and Auditory    Dementia (Chronic)      Other Visit Diagnoses    None.         Past Medical History:  Past Medical History:   Diagnosis Date   • Dementia    • Dementia    • Diabetes mellitus    • Hypertension    • Pancreatitis        Family History:  History reviewed. No pertinent family history.    Social History:  Social History   Substance Use Topics   • Smoking status: Never Smoker   • Smokeless tobacco: Never Used   • Alcohol use No       Current Medications:  Current Outpatient Prescriptions   Medication Sig Dispense Refill   • acetaminophen (TYLENOL) 325 MG tablet Take 2 tablets by mouth Every 4 (Four) Hours As Needed for  Headache or Fever (temperature greater than 101.5 F).     • acetaminophen (TYLENOL) 650 MG suppository Insert 650 mg into the rectum Every 4 (Four) Hours As Needed for Mild Pain .     • amiodarone (PACERONE) 200 MG tablet Take 1 tablet by mouth 3 (Three) Times a Day. Continue till 11/11/17 then change to BID     • amoxicillin-clavulanate (AUGMENTIN) 500-125 MG per tablet Take 1 tablet by mouth 3 (Three) Times a Day. 30 tablet 0   • atorvastatin (LIPITOR) 80 MG tablet Take 1 tablet by mouth Every Night.     • busPIRone (BUSPAR) 10 MG tablet Take 10 mg by mouth 2 (Two) Times a Day.     • carvedilol (COREG) 6.25 MG tablet Take 1 tablet by mouth 2 (Two) Times a Day With Meals.     • citalopram (CeleXA) 10 MG tablet Take 10 mg by mouth Daily.     • clopidogrel (PLAVIX) 75 MG tablet Take 1 tablet by mouth Daily. 30 tablet    • docusate calcium (SURFAK) 240 MG capsule Take 240 mg by mouth 2 (Two) Times a Day.     • donepezil (ARICEPT) 5 MG tablet Take 5 mg by mouth Every Night.     • ferrous sulfate 325 (65 FE) MG tablet Take 325 mg by mouth Daily With Breakfast.     • Fexofenadine HCl (ALLEGRA PO) Take 180 mg by mouth 2 (Two) Times a Day.     • fluticasone (FLONASE) 50 MCG/ACT nasal spray 2 sprays into each nostril Daily.     • furosemide (LASIX) 20 MG tablet Take 2 tablets by mouth Daily. (Patient taking differently: Take 40 mg by mouth Every Other Day As Needed.) 30 tablet 3   • glucagon (GLUCAGEN) 1 MG injection Infuse  into a venous catheter 1 (One) Time.     • guaiFENesin (ROBITUSSIN) 100 MG/5ML solution oral solution Take 10 mL by mouth Every 6 (Six) Hours As Needed (cough and congestion).     • hydrALAZINE (APRESOLINE) 10 MG tablet Take 10 mg by mouth 3 (Three) Times a Day.     • hydrocortisone 0.5 % cream Apply  topically 2 (Two) Times a Day.     • insulin aspart (novoLOG) 100 UNIT/ML injection Inject 10 Units under the skin 3 (Three) Times a Day With Meals.  12   • insulin detemir (LEVEMIR) 100 UNIT/ML injection  "Inject 25 Units under the skin Every Night.  12   • ipratropium-albuterol (DUO-NEB) 0.5-2.5 mg/mL nebulizer Take 3 mL by nebulization Every 4 (Four) Hours As Needed for Shortness of Air. 360 mL    • isosorbide mononitrate (IMDUR) 30 MG 24 hr tablet Take 1 tablet by mouth Daily. 30 tablet 11   • linagliptin (TRADJENTA) 5 MG tablet tablet Take 5 mg by mouth Daily.     • loperamide (IMODIUM) 2 MG capsule Take 1 capsule by mouth 3 (Three) Times a Day As Needed for Diarrhea.     • losartan (COZAAR) 100 MG tablet Take 100 mg by mouth Daily.     • nystatin (MYCOSTATIN) 985672 UNIT/GM cream Apply  topically 2 (Two) Times a Day.     • omeprazole (priLOSEC) 20 MG capsule Take 20 mg by mouth Daily.     • potassium chloride (K-DUR) 10 MEQ CR tablet Take 10 mEq by mouth 2 (Two) Times a Day.     • rivaroxaban (XARELTO) 15 MG tablet Take 1 tablet by mouth Daily With Dinner. 42 tablet    • rivastigmine (EXELON) 9.5 MG/24HR patch Place 1 patch on the skin Daily.     • rivastigmine (EXELON) 9.5 MG/24HR patch Place 1 patch on the skin Daily.     • valsartan (DIOVAN) 80 MG tablet Take 1 tablet by mouth Daily.     • vitamin D (ERGOCALCIFEROL) 86442 units capsule capsule Take 50,000 Units by mouth 1 (One) Time Per Week.     • ipratropium (ATROVENT) 0.02 % nebulizer solution Take 2.5 mL by nebulization 4 (Four) Times a Day As Needed for Wheezing or Shortness of Air. 12.5 mL 5     No current facility-administered medications for this visit.        Allergies:  Allergies   Allergen Reactions   • Codeine    • Metformin And Related        Vitals:  /64   Pulse 75   Temp 98 °F (36.7 °C)   Ht 149.9 cm (59.02\")   Wt 71.6 kg (157 lb 12.8 oz)   SpO2 95%   BMI 31.85 kg/m²     Imaging:    Imaging Results (most recent)     None          Pulmonary Functions Testing Results:    No results found for: FEV1, FVC, USU1SHW, TLC, DLCO    Results for orders placed or performed during the hospital encounter of 04/16/18   Urine Culture - Urine, Urine, " Clean Catch   Result Value Ref Range    Urine Culture      Urine Culture >100,000 CFU/mL Escherichia coli (A)        Susceptibility    Escherichia coli - SIXTO     Amikacin <=16 Susceptible ug/ml     Amoxicillin + Clavulanate <=8/4 Susceptible ug/ml     Ampicillin <=8 Susceptible ug/ml     Ampicillin + Sulbactam <=8/4 Susceptible ug/ml     Aztreonam <=8 Susceptible ug/ml     Cefazolin <=8 Susceptible ug/ml     Ciprofloxacin <=1 Susceptible ug/ml     Doripenem <=0.5 Susceptible ug/ml     Ertapenem <=1 Susceptible ug/ml     Gentamicin <=4 Susceptible ug/ml     Imipenem <=1 Susceptible ug/ml     Levofloxacin <=2 Susceptible ug/ml     Nitrofurantoin <=32 Susceptible ug/ml     Piperacillin + Tazobactam <=16 Susceptible ug/ml     Tetracycline <=4 Susceptible ug/ml     Tobramycin <=4 Susceptible ug/ml     Trimethoprim + Sulfamethoxazole <=2/38 Susceptible ug/ml   Comprehensive Metabolic Panel   Result Value Ref Range    Glucose 217 (H) 70 - 110 mg/dL    BUN 19 7 - 21 mg/dL    Creatinine 0.94 0.43 - 1.29 mg/dL    Sodium 140 135 - 153 mmol/L    Potassium 4.1 3.5 - 5.3 mmol/L    Chloride 102 99 - 112 mmol/L    CO2 27.0 24.3 - 31.9 mmol/L    Calcium 9.6 7.7 - 10.0 mg/dL    Total Protein 7.2 6.0 - 8.0 g/dL    Albumin 4.30 3.40 - 4.80 g/dL    ALT (SGPT) 9 (L) 10 - 36 U/L    AST (SGOT) 13 10 - 30 U/L    Alkaline Phosphatase 84 35 - 104 U/L    Total Bilirubin 0.7 0.2 - 1.8 mg/dL    eGFR Non African Amer 57 (L) >60 mL/min/1.73    Globulin 2.9 gm/dL    A/G Ratio 1.5 1.5 - 2.5 g/dL    BUN/Creatinine Ratio 20.2 7.0 - 25.0    Anion Gap 11.0 3.6 - 11.2 mmol/L   Troponin   Result Value Ref Range    Troponin I 0.010 <=0.040 ng/mL   Protime-INR   Result Value Ref Range    Protime 20.4 (H) 11.0 - 15.4 Seconds    INR 1.72 (H) 0.90 - 1.10   Urinalysis With / Culture If Indicated - Urine, Clean Catch   Result Value Ref Range    Color, UA Yellow Yellow, Straw    Appearance, UA Cloudy (A) Clear    pH, UA <=5.0 5.0 - 8.0    Specific [place], UA  1.019 1.005 - 1.030    Glucose, UA Negative Negative    Ketones, UA Negative Negative    Bilirubin, UA Negative Negative    Blood, UA Negative Negative    Protein, UA Negative Negative    Leuk Esterase, UA Moderate (2+) (A) Negative    Nitrite, UA Positive (A) Negative    Urobilinogen, UA 0.2 E.U./dL 0.2 - 1.0 E.U./dL   CBC Auto Differential   Result Value Ref Range    WBC 10.38 4.50 - 12.50 10*3/mm3    RBC 4.97 4.20 - 5.40 10*6/mm3    Hemoglobin 14.6 12.0 - 16.0 g/dL    Hematocrit 44.4 37.0 - 47.0 %    MCV 89.3 80.0 - 94.0 fL    MCH 29.4 27.0 - 33.0 pg    MCHC 32.9 (L) 33.0 - 37.0 g/dL    RDW 15.8 (H) 11.5 - 14.5 %    RDW-SD 51.1 37.0 - 54.0 fl    MPV 10.6 (H) 6.0 - 10.0 fL    Platelets 226 130 - 400 10*3/mm3    Neutrophil % 76.5 (H) 40.0 - 75.0 %    Lymphocyte % 15.4 (L) 16.0 - 46.0 %    Monocyte % 7.0 0.0 - 12.0 %    Eosinophil % 0.6 0.0 - 7.0 %    Basophil % 0.3 0.0 - 2.0 %    Immature Grans % 0.2 0.0 - 0.5 %    Neutrophils, Absolute 7.94 (H) 1.40 - 6.50 10*3/mm3    Lymphocytes, Absolute 1.60 1.00 - 3.00 10*3/mm3    Monocytes, Absolute 0.73 0.10 - 0.90 10*3/mm3    Eosinophils, Absolute 0.06 0.00 - 0.70 10*3/mm3    Basophils, Absolute 0.03 0.00 - 0.30 10*3/mm3    Immature Grans, Absolute 0.02 0.00 - 0.03 10*3/mm3   Osmolality, Calculated   Result Value Ref Range    Osmolality Calc 288.2 273.0 - 305.0 mOsm/kg   Urinalysis, Microscopic Only - Urine, Clean Catch   Result Value Ref Range    RBC, UA 0-2 None Seen, 0-2 /HPF    WBC, UA 31-50 (A) None Seen, 0-2 /HPF    Bacteria, UA 4+ (A) None Seen /HPF    Squamous Epithelial Cells, UA None Seen None Seen, 0-2 /HPF    Hyaline Casts, UA 7-12 None Seen /LPF    Methodology Automated Microscopy    Troponin   Result Value Ref Range    Troponin I 0.010 <=0.040 ng/mL   Light Blue Top   Result Value Ref Range    Extra Tube hold for add-on    Green Top (Gel)   Result Value Ref Range    Extra Tube Hold for add-ons.    Lavender Top   Result Value Ref Range    Extra Tube hold for  add-on    Gold Top - SST   Result Value Ref Range    Extra Tube Hold for add-ons.        Objective   Physical Exam     GENERAL APPEARANCE: Well developed, well nourished, alert and cooperative, and appears to be in no acute distress.    HEAD: normocephalic.    EYES: PERRL, EOMI. Fundi normal, vision is grossly intact.    THROAT: Oral cavity and pharynx normal. No inflammation, swelling, exudate, or lesions.     NECK: Neck supple.     CARDIAC: Normal S1 and S2. No S3, S4 or murmurs. Rhythm is regular. There is no peripheral edema, cyanosis or pallor. Extremities are warm and well perfused. Capillary refill is less than 2 seconds. No carotid bruits.    RESPIRATORY: Clear to auscultation without rales, rhonchi, wheezing or diminished breath sounds.    GI: Positive bowel sounds. Soft, nondistended, nontender.     MUSCULOSKELETAL: No significant deformity or joint abnormality. No edema. Peripheral pulses intact. No varicosities.    NEUROLOGICAL: Strength and sensation symmetric and intact throughout.     PSYCHIATRIC: The mental examination revealed the patient was oriented to person, place, and time.       Assessment/Plan      Aspiration Pneumonia: Continue modified diet and aspiration precautions.    Multiple Pulmonary nodules: CT chest was repeated in January.  This was reviewed and discussed with patient and family.    Family does not want to do any invasive procedures for further investigation.  They state that if it were to be cancer that they would not want to treat it due to her dementia, age and other co morbidities.           Hypoxia: Contnues to use oxygen during ambulation and as needed.    Educated patient on the importance of wearing oxygen as prescribed, otherwise could become hypoxic, faint, fall, hit his/her head, cause a brain hemorrhage, and potentially die.      Shortness of breath:  Likely related to recent pneumonia, deconditioning, body habitus and heart disease.  Recent STEMI, being followed  closely by cardiology.  Shortness of breath continue to improve.  Will continue rehab.  PFT shows in progress.  patient was unable to do test.  Continue current inhalers.    STEMI: Being treated only with Plavix and Xarelto, as family chose not to do any intervention due to her age, dementia and other comorbidities.  Is being followed by cardiology.    Scribed for Dr. Nash, by LILY Marrero          ICD-10-CM ICD-9-CM   1. Shortness of breath R06.02 786.05   2. Pulmonary nodules R91.8 793.19   3. Hypoxia R09.02 799.02   4. Dementia with behavioral disturbance, unspecified dementia type F03.91 294.21       Return in about 6 months (around 11/9/2018).         IWesley M.D. attest that the above note accurately reflects the work and decisions made  by me.  Patient was seen and evaluated by Dr. Nash, including history of present illness, physical exam, assessment, and treatment plan.  The above note was reviewed and edited by Dr. Nash.

## 2018-05-27 ENCOUNTER — APPOINTMENT (OUTPATIENT)
Dept: GENERAL RADIOLOGY | Facility: HOSPITAL | Age: 83
End: 2018-05-27

## 2018-05-27 ENCOUNTER — HOSPITAL ENCOUNTER (EMERGENCY)
Facility: HOSPITAL | Age: 83
Discharge: NURSING, REHAB OR LONG-TERM CARE | End: 2018-05-27
Attending: EMERGENCY MEDICINE | Admitting: EMERGENCY MEDICINE

## 2018-05-27 VITALS
DIASTOLIC BLOOD PRESSURE: 78 MMHG | SYSTOLIC BLOOD PRESSURE: 144 MMHG | BODY MASS INDEX: 26.33 KG/M2 | OXYGEN SATURATION: 98 % | TEMPERATURE: 98.2 F | HEART RATE: 79 BPM | RESPIRATION RATE: 20 BRPM | WEIGHT: 158 LBS | HEIGHT: 65 IN

## 2018-05-27 DIAGNOSIS — F02.818 DEMENTIA ASSOCIATED WITH OTHER UNDERLYING DISEASE WITH BEHAVIORAL DISTURBANCE (HCC): Primary | ICD-10-CM

## 2018-05-27 LAB
ALBUMIN SERPL-MCNC: 4 G/DL (ref 3.4–4.8)
ALBUMIN/GLOB SERPL: 1.7 G/DL (ref 1.5–2.5)
ALP SERPL-CCNC: 77 U/L (ref 35–104)
ALT SERPL W P-5'-P-CCNC: 9 U/L (ref 10–36)
ANION GAP SERPL CALCULATED.3IONS-SCNC: 4.5 MMOL/L (ref 3.6–11.2)
AST SERPL-CCNC: 12 U/L (ref 10–30)
BASOPHILS # BLD AUTO: 0.02 10*3/MM3 (ref 0–0.3)
BASOPHILS NFR BLD AUTO: 0.2 % (ref 0–2)
BILIRUB SERPL-MCNC: 0.4 MG/DL (ref 0.2–1.8)
BILIRUB UR QL STRIP: NEGATIVE
BUN BLD-MCNC: 16 MG/DL (ref 7–21)
BUN/CREAT SERPL: 20.3 (ref 7–25)
CALCIUM SPEC-SCNC: 8.7 MG/DL (ref 7.7–10)
CHLORIDE SERPL-SCNC: 104 MMOL/L (ref 99–112)
CLARITY UR: CLEAR
CO2 SERPL-SCNC: 29.5 MMOL/L (ref 24.3–31.9)
COLOR UR: YELLOW
CREAT BLD-MCNC: 0.79 MG/DL (ref 0.43–1.29)
DEPRECATED RDW RBC AUTO: 49.3 FL (ref 37–54)
EOSINOPHIL # BLD AUTO: 0.18 10*3/MM3 (ref 0–0.7)
EOSINOPHIL NFR BLD AUTO: 1.6 % (ref 0–7)
ERYTHROCYTE [DISTWIDTH] IN BLOOD BY AUTOMATED COUNT: 15.1 % (ref 11.5–14.5)
GFR SERPL CREATININE-BSD FRML MDRD: 70 ML/MIN/1.73
GLOBULIN UR ELPH-MCNC: 2.4 GM/DL
GLUCOSE BLD-MCNC: 148 MG/DL (ref 70–110)
GLUCOSE UR STRIP-MCNC: NEGATIVE MG/DL
HCT VFR BLD AUTO: 39.2 % (ref 37–47)
HGB BLD-MCNC: 12.5 G/DL (ref 12–16)
HGB UR QL STRIP.AUTO: NEGATIVE
IMM GRANULOCYTES # BLD: 0.05 10*3/MM3 (ref 0–0.03)
IMM GRANULOCYTES NFR BLD: 0.4 % (ref 0–0.5)
KETONES UR QL STRIP: NEGATIVE
LEUKOCYTE ESTERASE UR QL STRIP.AUTO: NEGATIVE
LYMPHOCYTES # BLD AUTO: 2.44 10*3/MM3 (ref 1–3)
LYMPHOCYTES NFR BLD AUTO: 21.7 % (ref 16–46)
MCH RBC QN AUTO: 28.7 PG (ref 27–33)
MCHC RBC AUTO-ENTMCNC: 31.9 G/DL (ref 33–37)
MCV RBC AUTO: 90.1 FL (ref 80–94)
MONOCYTES # BLD AUTO: 0.98 10*3/MM3 (ref 0.1–0.9)
MONOCYTES NFR BLD AUTO: 8.7 % (ref 0–12)
NEUTROPHILS # BLD AUTO: 7.55 10*3/MM3 (ref 1.4–6.5)
NEUTROPHILS NFR BLD AUTO: 67.4 % (ref 40–75)
NITRITE UR QL STRIP: NEGATIVE
OSMOLALITY SERPL CALC.SUM OF ELEC: 279.6 MOSM/KG (ref 273–305)
PH UR STRIP.AUTO: <=5 [PH] (ref 5–8)
PLATELET # BLD AUTO: 240 10*3/MM3 (ref 130–400)
PMV BLD AUTO: 9.7 FL (ref 6–10)
POTASSIUM BLD-SCNC: 4.1 MMOL/L (ref 3.5–5.3)
PROT SERPL-MCNC: 6.4 G/DL (ref 6–8)
PROT UR QL STRIP: NEGATIVE
RBC # BLD AUTO: 4.35 10*6/MM3 (ref 4.2–5.4)
SODIUM BLD-SCNC: 138 MMOL/L (ref 135–153)
SP GR UR STRIP: 1.02 (ref 1–1.03)
UROBILINOGEN UR QL STRIP: NORMAL
WBC NRBC COR # BLD: 11.22 10*3/MM3 (ref 4.5–12.5)

## 2018-05-27 PROCEDURE — 81003 URINALYSIS AUTO W/O SCOPE: CPT | Performed by: EMERGENCY MEDICINE

## 2018-05-27 PROCEDURE — 93010 ELECTROCARDIOGRAM REPORT: CPT | Performed by: INTERNAL MEDICINE

## 2018-05-27 PROCEDURE — 71045 X-RAY EXAM CHEST 1 VIEW: CPT

## 2018-05-27 PROCEDURE — 96360 HYDRATION IV INFUSION INIT: CPT

## 2018-05-27 PROCEDURE — 93005 ELECTROCARDIOGRAM TRACING: CPT | Performed by: EMERGENCY MEDICINE

## 2018-05-27 PROCEDURE — 99285 EMERGENCY DEPT VISIT HI MDM: CPT

## 2018-05-27 PROCEDURE — 80053 COMPREHEN METABOLIC PANEL: CPT | Performed by: EMERGENCY MEDICINE

## 2018-05-27 PROCEDURE — P9612 CATHETERIZE FOR URINE SPEC: HCPCS

## 2018-05-27 PROCEDURE — 96361 HYDRATE IV INFUSION ADD-ON: CPT

## 2018-05-27 PROCEDURE — 71045 X-RAY EXAM CHEST 1 VIEW: CPT | Performed by: RADIOLOGY

## 2018-05-27 PROCEDURE — 85025 COMPLETE CBC W/AUTO DIFF WBC: CPT | Performed by: EMERGENCY MEDICINE

## 2018-05-27 PROCEDURE — 36415 COLL VENOUS BLD VENIPUNCTURE: CPT

## 2018-05-27 RX ORDER — HYDROXYZINE HYDROCHLORIDE 25 MG/1
25 TABLET, FILM COATED ORAL ONCE
Status: COMPLETED | OUTPATIENT
Start: 2018-05-27 | End: 2018-05-27

## 2018-05-27 RX ORDER — HYDROXYZINE HYDROCHLORIDE 25 MG/1
25 TABLET, FILM COATED ORAL EVERY 8 HOURS PRN
Qty: 18 TABLET | Refills: 0 | Status: ON HOLD | OUTPATIENT
Start: 2018-05-27 | End: 2019-02-06

## 2018-05-27 RX ADMIN — HYDROXYZINE 25 MG: 25 TABLET, FILM COATED ORAL at 22:21

## 2018-05-27 RX ADMIN — SODIUM CHLORIDE 1000 ML: 9 INJECTION, SOLUTION INTRAVENOUS at 20:18

## 2018-07-17 ENCOUNTER — OFFICE VISIT (OUTPATIENT)
Dept: CARDIOLOGY | Facility: CLINIC | Age: 83
End: 2018-07-17

## 2018-07-17 VITALS
WEIGHT: 143.4 LBS | DIASTOLIC BLOOD PRESSURE: 69 MMHG | BODY MASS INDEX: 28.91 KG/M2 | OXYGEN SATURATION: 97 % | HEIGHT: 59 IN | SYSTOLIC BLOOD PRESSURE: 114 MMHG | HEART RATE: 62 BPM

## 2018-07-17 DIAGNOSIS — I10 ESSENTIAL HYPERTENSION: Primary | Chronic | ICD-10-CM

## 2018-07-17 DIAGNOSIS — I21.4 NSTEMI (NON-ST ELEVATED MYOCARDIAL INFARCTION) (HCC): ICD-10-CM

## 2018-07-17 DIAGNOSIS — I45.10 RBBB: Chronic | ICD-10-CM

## 2018-07-17 PROCEDURE — 99213 OFFICE O/P EST LOW 20 MIN: CPT | Performed by: INTERNAL MEDICINE

## 2018-07-17 PROCEDURE — 93000 ELECTROCARDIOGRAM COMPLETE: CPT | Performed by: INTERNAL MEDICINE

## 2018-07-17 RX ORDER — HALOPERIDOL 5 MG/1
5 TABLET ORAL 4 TIMES DAILY
Status: ON HOLD | COMMUNITY
End: 2019-02-06

## 2018-07-17 RX ORDER — PREDNISOLONE ACETATE 10 MG/ML
1 SUSPENSION/ DROPS OPHTHALMIC 4 TIMES DAILY
Status: ON HOLD | COMMUNITY
End: 2019-02-06

## 2018-07-17 RX ORDER — CIPROFLOXACIN AND DEXAMETHASONE 3; 1 MG/ML; MG/ML
4 SUSPENSION/ DROPS AURICULAR (OTIC) 2 TIMES DAILY
Status: ON HOLD | COMMUNITY
End: 2019-02-06

## 2018-07-17 RX ORDER — HYDROXYZINE PAMOATE 25 MG/1
25 CAPSULE ORAL 3 TIMES DAILY PRN
Status: ON HOLD | COMMUNITY
End: 2019-02-06

## 2018-07-17 RX ORDER — CEFUROXIME AXETIL 250 MG/1
250 TABLET ORAL 2 TIMES DAILY
Status: ON HOLD | COMMUNITY
End: 2019-02-06

## 2018-07-17 RX ORDER — LEVOTHYROXINE SODIUM 0.03 MG/1
50 TABLET ORAL DAILY
COMMUNITY

## 2018-07-17 NOTE — PROGRESS NOTES
[facility] CARDIOLOGY  [address]  [place] [address]  Phone: [phone]  Fax: [phone]    07/17/2018    Chief Complaint: Routine followup of , CAD, Afib    History:   Floridalma Bryant is a 83 y.o. female seen in followup, pt now at home from rehab. One episode of CP and was seen at PCP and EKG was unchanged 3 weeks ago. Otherwise OK. Recurrent UTI with ABX therapy. Not much appetite. No edema. Some SOB with exertion. No bleeding reportedly now stool softner.           REcent:  Floridalma Bryant is a 83 y.o. female seen in followup, RECENT hsopitalization with PNA, NSTEMI, CHF, pAF. Now routinte f/u. Pt with advanced dementia. Med Rx  Elected. Pt in rehab. Pt remembers no angina. Daughter reports occasional angina.    Past Medical History:   Diagnosis Date   • Dementia    • Dementia    • Diabetes mellitus (CMS/HCC)    • Hypertension    • Pancreatitis        Past Social History:  Social History     Social History   • Marital status:      Social History Main Topics   • Smoking status: Never Smoker   • Smokeless tobacco: Never Used   • Alcohol use No   • Drug use: No   • Sexual activity: Defer     Other Topics Concern   • Not on file       Past Family History:  History reviewed. No pertinent family history.    Review of Systems:   Please see HPI  Constitution: No chills, no rigors, no unexplained weight loss or weight gain  Eyes:  No diplopia, no blurred vision, no loss of vision, conjunctiva is pink and sclera is anicteric  ENT:  No tinnitus, no otorrhea, no epistaxis, no sore throat   Respiratory: No cough, no hemoptysis  Cardiovascular: see HPI  Gastrointestinal: No nausea, no vomiting, no hematemesis, no diarrhea or constipation, no melena  Genitourinary: No frequency of dysuria no hematuria  Integument: No pruritis and  no skin rash  Hematologic / Lymphatic: No excessive bleeding, easy bruising, fatigue, lymphadenopathy and petechiae  Musculoskeletal: No joint pain,  joint stiffness, joint swelling, muscle pain, muscle weakness and neck pain  Neurological: No dizziness, headaches, light headedness, seizures and vertigo  Endocrine: No frequent urination and nocturia, temperature intolerance, weight gain, unintended and weight loss, unintended      Current Outpatient Prescriptions on File Prior to Visit   Medication Sig Dispense Refill   • acetaminophen (TYLENOL) 325 MG tablet Take 2 tablets by mouth Every 4 (Four) Hours As Needed for Headache or Fever (temperature greater than 101.5 F).     • acetaminophen (TYLENOL) 650 MG suppository Insert 650 mg into the rectum Every 4 (Four) Hours As Needed for Mild Pain .     • carvedilol (COREG) 6.25 MG tablet Take 1 tablet by mouth 2 (Two) Times a Day With Meals.     • citalopram (CeleXA) 10 MG tablet Take 10 mg by mouth Daily.     • clopidogrel (PLAVIX) 75 MG tablet Take 1 tablet by mouth Daily. 30 tablet    • docusate calcium (SURFAK) 240 MG capsule Take 240 mg by mouth 2 (Two) Times a Day.     • donepezil (ARICEPT) 5 MG tablet Take 5 mg by mouth Every Night.     • ferrous sulfate 325 (65 FE) MG tablet Take 325 mg by mouth Daily With Breakfast.     • fluticasone (FLONASE) 50 MCG/ACT nasal spray 2 sprays into each nostril Daily.     • furosemide (LASIX) 20 MG tablet Take 2 tablets by mouth Daily. (Patient taking differently: Take 40 mg by mouth Every Other Day As Needed.) 30 tablet 3   • guaiFENesin (ROBITUSSIN) 100 MG/5ML solution oral solution Take 10 mL by mouth Every 6 (Six) Hours As Needed (cough and congestion).     • insulin aspart (novoLOG) 100 UNIT/ML injection Inject 10 Units under the skin 3 (Three) Times a Day With Meals.  12   • insulin detemir (LEVEMIR) 100 UNIT/ML injection Inject 25 Units under the skin Every Night.  12   • ipratropium (ATROVENT) 0.02 % nebulizer solution Take 2.5 mL by nebulization 4 (Four) Times a Day As Needed for Wheezing or Shortness of Air. 12.5 mL 5   • ipratropium-albuterol (DUO-NEB) 0.5-2.5 mg/mL  nebulizer Take 3 mL by nebulization Every 4 (Four) Hours As Needed for Shortness of Air. 360 mL    • isosorbide mononitrate (IMDUR) 30 MG 24 hr tablet Take 1 tablet by mouth Daily. 30 tablet 11   • linagliptin (TRADJENTA) 5 MG tablet tablet Take 5 mg by mouth Daily.     • loperamide (IMODIUM) 2 MG capsule Take 1 capsule by mouth 3 (Three) Times a Day As Needed for Diarrhea.     • losartan (COZAAR) 100 MG tablet Take 100 mg by mouth Daily.     • omeprazole (priLOSEC) 20 MG capsule Take 20 mg by mouth Daily.     • potassium chloride (K-DUR) 10 MEQ CR tablet Take 10 mEq by mouth 2 (Two) Times a Day.     • rivaroxaban (XARELTO) 15 MG tablet Take 1 tablet by mouth Daily With Dinner. 42 tablet    • rivastigmine (EXELON) 9.5 MG/24HR patch Place 1 patch on the skin Daily.     • rivastigmine (EXELON) 9.5 MG/24HR patch Place 1 patch on the skin Daily.     • vitamin D (ERGOCALCIFEROL) 71853 units capsule capsule Take 50,000 Units by mouth 1 (One) Time Per Week.     • amoxicillin-clavulanate (AUGMENTIN) 500-125 MG per tablet Take 1 tablet by mouth 3 (Three) Times a Day. 30 tablet 0   • atorvastatin (LIPITOR) 80 MG tablet Take 1 tablet by mouth Every Night.     • busPIRone (BUSPAR) 10 MG tablet Take 10 mg by mouth 2 (Two) Times a Day.     • Fexofenadine HCl (ALLEGRA PO) Take 180 mg by mouth 2 (Two) Times a Day.     • glucagon (GLUCAGEN) 1 MG injection Infuse  into a venous catheter 1 (One) Time.     • hydrALAZINE (APRESOLINE) 10 MG tablet Take 10 mg by mouth 3 (Three) Times a Day.     • hydrocortisone 0.5 % cream Apply  topically 2 (Two) Times a Day.     • hydrOXYzine (ATARAX) 25 MG tablet Take 1 tablet by mouth Every 8 (Eight) Hours As Needed for Itching. 18 tablet 0   • nystatin (MYCOSTATIN) 133629 UNIT/GM cream Apply  topically 2 (Two) Times a Day.     • valsartan (DIOVAN) 80 MG tablet Take 1 tablet by mouth Daily.     • [DISCONTINUED] amiodarone (PACERONE) 200 MG tablet Take 1 tablet by mouth 3 (Three) Times a Day. Continue  till 11/11/17 then change to BID       No current facility-administered medications on file prior to visit.        Allergies   Allergen Reactions   • Codeine    • Metformin And Related        Objective:  Vitals:    07/17/18 1530   BP: 114/69   Pulse: 62   SpO2: 97%     Comfortable NAD  PERRL, conjunctiva clear  Neck supple, no JVD or thyromegaly appreciated  S1/S2 RRR, no m/r/g  Lungs CTA B, normal effort  Abdomen S/NT/ND (+) BS, no HSM appreciated  Extremities warm, no clubbing, cyanosis, or edema  Normal gait  No visible or palpable skin lesions  A/Ox4, mood and affect appropriate  Pulse exam: Rainer's:    DATA:       Results for orders placed during the hospital encounter of 10/26/17   Adult Transthoracic Echo Complete W/ Cont if Necessary Per Protocol    Narrative · The left ventricular cavity is borderline dilated.  · Left ventricular systolic function is mildly decreased. Estimated EF =   40%. The LAD territory is severely hypokinetic  · Mild to moderate tricuspid valve regurgitation is present.                            ECG 12 Lead  Date/Time: 7/17/2018 3:32 PM  Performed by: ZAINAB DUBON  Authorized by: ZAINAB DUBON                 I personally viewed and interpreted the patient's EKG:      A/P:  CAd: rare CP.    PAF: NSR with RBB and LAFB on EKG today and taking Xarelto.    LVEf: mildly decreased. No CHF. Appears stable.    F/u 6months    Thank you for allowing me to participate in the care of Floridalma Bryant. Feel free to contact me directly with any further questions or concerns.

## 2018-11-15 ENCOUNTER — OFFICE VISIT (OUTPATIENT)
Dept: PULMONOLOGY | Facility: CLINIC | Age: 83
End: 2018-11-15

## 2018-11-15 VITALS
OXYGEN SATURATION: 94 % | HEART RATE: 97 BPM | SYSTOLIC BLOOD PRESSURE: 118 MMHG | TEMPERATURE: 97.4 F | DIASTOLIC BLOOD PRESSURE: 70 MMHG

## 2018-11-15 DIAGNOSIS — R09.02 HYPOXIA: ICD-10-CM

## 2018-11-15 DIAGNOSIS — R06.02 SHORTNESS OF BREATH: Primary | ICD-10-CM

## 2018-11-15 DIAGNOSIS — R91.8 PULMONARY NODULES: ICD-10-CM

## 2018-11-15 PROCEDURE — 99214 OFFICE O/P EST MOD 30 MIN: CPT | Performed by: NURSE PRACTITIONER

## 2018-11-15 NOTE — PROGRESS NOTES
Interval history since last visit: cough, congestion     Recent hospitalizations: none     Investigations (imaging, PFT's, labs, sleep study, record requests, etc.) none     Have you had the Influenza Vaccine? no   Would you like to receive this Vaccine today? no    Have you had the Pneumonia Vaccine?  no  Would you like to receive this Vaccine today? no    Subjective    Floridalma Bryant presents for the following Shortness of Breath  .    History of Present Illness     Ms. Bryant is here today for a follow up on shortness of breath.  She has a history of dementia and is a poor historian.  Her daughter is with her at today's visit.  Her daughter states that she has been doing well.  She states that she has been taken off of her oxygen and does not need it any longer.  She states that she has no major shortness of breath.  She does report that she has continuous sinus congestion and drainage but is following with [facility] and throat doctor for this.  She has had no recent issues with aspiration pneumonia and has had no recent illnesses or hospitalizations.  They decline flu and pneumonia vaccines today.    Review of Systems   Constitutional: Positive for fatigue. Negative for appetite change, chills, diaphoresis and unexpected weight change.   HENT: Positive for congestion. Negative for sore throat, trouble swallowing and voice change.    Eyes: Negative for visual disturbance.   Respiratory: Positive for cough, shortness of breath and wheezing. Negative for apnea and choking.    Cardiovascular: Negative for chest pain, palpitations and leg swelling.   Gastrointestinal: Negative for abdominal pain, constipation, diarrhea, nausea and vomiting.   Endocrine: Negative for cold intolerance, heat intolerance, polydipsia, polyphagia and polyuria.   Genitourinary: Negative for difficulty urinating and dysuria.   Musculoskeletal: Negative for gait problem.   Skin: Negative for rash and wound.   Neurological: Negative for syncope  and light-headedness.   Hematological: Negative for adenopathy.   Psychiatric/Behavioral: Negative for agitation, behavioral problems and confusion.   All other systems reviewed and are negative.      Active Problems:  Problem List Items Addressed This Visit        Respiratory    Pulmonary nodules    Shortness of breath - Primary    Hypoxia          Past Medical History:  Past Medical History:   Diagnosis Date   • Dementia    • Dementia    • Diabetes mellitus (CMS/HCC)    • Hypertension    • Pancreatitis        Family History:  History reviewed. No pertinent family history.    Social History:  Social History     Tobacco Use   • Smoking status: Never Smoker   • Smokeless tobacco: Never Used   Substance Use Topics   • Alcohol use: No       Current Medications:  Current Outpatient Medications   Medication Sig Dispense Refill   • acetaminophen (TYLENOL) 325 MG tablet Take 2 tablets by mouth Every 4 (Four) Hours As Needed for Headache or Fever (temperature greater than 101.5 F).     • carvedilol (COREG) 6.25 MG tablet Take 1 tablet by mouth 2 (Two) Times a Day With Meals.     • citalopram (CeleXA) 10 MG tablet Take 20 mg by mouth Daily.     • clopidogrel (PLAVIX) 75 MG tablet Take 1 tablet by mouth Daily. 30 tablet    • docusate calcium (SURFAK) 240 MG capsule Take 240 mg by mouth 2 (Two) Times a Day.     • ferrous sulfate 325 (65 FE) MG tablet Take 325 mg by mouth Daily With Breakfast.     • insulin aspart (novoLOG) 100 UNIT/ML injection Inject 10 Units under the skin 3 (Three) Times a Day With Meals.  12   • insulin detemir (LEVEMIR) 100 UNIT/ML injection Inject 25 Units under the skin Every Night.  12   • isosorbide mononitrate (IMDUR) 30 MG 24 hr tablet Take 1 tablet by mouth Daily. 30 tablet 11   • levothyroxine (SYNTHROID, LEVOTHROID) 25 MCG tablet Take 50 mcg by mouth Daily.     • nystatin (MYCOSTATIN) 352581 UNIT/GM cream Apply  topically 2 (Two) Times a Day.     • omeprazole (priLOSEC) 20 MG capsule Take 20 mg  by mouth Daily.     • potassium chloride (K-DUR) 10 MEQ CR tablet Take 10 mEq by mouth 2 (Two) Times a Day.     • rivaroxaban (XARELTO) 15 MG tablet Take 1 tablet by mouth Daily With Dinner. 42 tablet    • rivastigmine (EXELON) 9.5 MG/24HR patch Place 1 patch on the skin Daily.     • rivastigmine (EXELON) 9.5 MG/24HR patch Place 1 patch on the skin Daily.     • atorvastatin (LIPITOR) 80 MG tablet Take 1 tablet by mouth Every Night.     • busPIRone (BUSPAR) 10 MG tablet Take 10 mg by mouth 2 (Two) Times a Day.     • cefuroxime (CEFTIN) 250 MG tablet Take 250 mg by mouth 2 (Two) Times a Day.     • ciprofloxacin-dexamethasone (CIPRODEX) 0.3-0.1 % otic suspension 4 drops 2 (Two) Times a Day.     • donepezil (ARICEPT) 5 MG tablet Take 5 mg by mouth Every Night.     • Fexofenadine HCl (ALLEGRA PO) Take 180 mg by mouth 2 (Two) Times a Day.     • fluticasone (FLONASE) 50 MCG/ACT nasal spray 2 sprays into each nostril Daily.     • furosemide (LASIX) 20 MG tablet Take 2 tablets by mouth Daily. (Patient taking differently: Take 40 mg by mouth Every Other Day As Needed.) 30 tablet 3   • glucagon (GLUCAGEN) 1 MG injection Infuse  into a venous catheter 1 (One) Time.     • guaiFENesin (ROBITUSSIN) 100 MG/5ML solution oral solution Take 10 mL by mouth Every 6 (Six) Hours As Needed (cough and congestion).     • haloperidol (HALDOL) 5 MG tablet Take 5 mg by mouth 4 (Four) Times a Day.     • hydrALAZINE (APRESOLINE) 10 MG tablet Take 10 mg by mouth 3 (Three) Times a Day.     • hydrocortisone 0.5 % cream Apply  topically 2 (Two) Times a Day.     • hydrOXYzine (ATARAX) 25 MG tablet Take 1 tablet by mouth Every 8 (Eight) Hours As Needed for Itching. 18 tablet 0   • hydrOXYzine (VISTARIL) 25 MG capsule Take 25 mg by mouth 3 (Three) Times a Day As Needed for Itching.     • ipratropium (ATROVENT) 0.02 % nebulizer solution Take 2.5 mL by nebulization 4 (Four) Times a Day As Needed for Wheezing or Shortness of Air. 12.5 mL 5   •  ipratropium-albuterol (DUO-NEB) 0.5-2.5 mg/mL nebulizer Take 3 mL by nebulization Every 4 (Four) Hours As Needed for Shortness of Air. 360 mL    • linagliptin (TRADJENTA) 5 MG tablet tablet Take 5 mg by mouth Daily.     • loperamide (IMODIUM) 2 MG capsule Take 1 capsule by mouth 3 (Three) Times a Day As Needed for Diarrhea.     • losartan (COZAAR) 100 MG tablet Take 100 mg by mouth Daily.     • prednisoLONE acetate (PRED FORTE) 1 % ophthalmic suspension 1 drop 4 (Four) Times a Day.     • valsartan (DIOVAN) 80 MG tablet Take 1 tablet by mouth Daily.     • vitamin D (ERGOCALCIFEROL) 03373 units capsule capsule Take 50,000 Units by mouth 1 (One) Time Per Week.       No current facility-administered medications for this visit.        Allergies:  Allergies   Allergen Reactions   • Codeine    • Metformin And Related        Vitals:  /70   Pulse 97   Temp 97.4 °F (36.3 °C)   SpO2 94%     Imaging:    Imaging Results (most recent)     None          Pulmonary Functions Testing Results:    No results found for: FEV1, FVC, WMU8BTU, TLC, DLCO    Results for orders placed or performed during the hospital encounter of 05/27/18   Comprehensive Metabolic Panel   Result Value Ref Range    Glucose 148 (H) 70 - 110 mg/dL    BUN 16 7 - 21 mg/dL    Creatinine 0.79 0.43 - 1.29 mg/dL    Sodium 138 135 - 153 mmol/L    Potassium 4.1 3.5 - 5.3 mmol/L    Chloride 104 99 - 112 mmol/L    CO2 29.5 24.3 - 31.9 mmol/L    Calcium 8.7 7.7 - 10.0 mg/dL    Total Protein 6.4 6.0 - 8.0 g/dL    Albumin 4.00 3.40 - 4.80 g/dL    ALT (SGPT) 9 (L) 10 - 36 U/L    AST (SGOT) 12 10 - 30 U/L    Alkaline Phosphatase 77 35 - 104 U/L    Total Bilirubin 0.4 0.2 - 1.8 mg/dL    eGFR Non African Amer 70 >60 mL/min/1.73    Globulin 2.4 gm/dL    A/G Ratio 1.7 1.5 - 2.5 g/dL    BUN/Creatinine Ratio 20.3 7.0 - 25.0    Anion Gap 4.5 3.6 - 11.2 mmol/L   Urinalysis With / Culture If Indicated - Urine, Catheter   Result Value Ref Range    Color, UA Yellow Yellow, Straw     Appearance, UA Clear Clear    pH, UA <=5.0 5.0 - 8.0    Specific Gravity, UA 1.016 1.005 - 1.030    Glucose, UA Negative Negative    Ketones, UA Negative Negative    Bilirubin, UA Negative Negative    Blood, UA Negative Negative    Protein, UA Negative Negative    Leuk Esterase, UA Negative Negative    Nitrite, UA Negative Negative    Urobilinogen, UA 0.2 E.U./dL 0.2 - 1.0 E.U./dL   CBC Auto Differential   Result Value Ref Range    WBC 11.22 4.50 - 12.50 10*3/mm3    RBC 4.35 4.20 - 5.40 10*6/mm3    Hemoglobin 12.5 12.0 - 16.0 g/dL    Hematocrit 39.2 37.0 - 47.0 %    MCV 90.1 80.0 - 94.0 fL    MCH 28.7 27.0 - 33.0 pg    MCHC 31.9 (L) 33.0 - 37.0 g/dL    RDW 15.1 (H) 11.5 - 14.5 %    RDW-SD 49.3 37.0 - 54.0 fl    MPV 9.7 6.0 - 10.0 fL    Platelets 240 130 - 400 10*3/mm3    Neutrophil % 67.4 40.0 - 75.0 %    Lymphocyte % 21.7 16.0 - 46.0 %    Monocyte % 8.7 0.0 - 12.0 %    Eosinophil % 1.6 0.0 - 7.0 %    Basophil % 0.2 0.0 - 2.0 %    Immature Grans % 0.4 0.0 - 0.5 %    Neutrophils, Absolute 7.55 (H) 1.40 - 6.50 10*3/mm3    Lymphocytes, Absolute 2.44 1.00 - 3.00 10*3/mm3    Monocytes, Absolute 0.98 (H) 0.10 - 0.90 10*3/mm3    Eosinophils, Absolute 0.18 0.00 - 0.70 10*3/mm3    Basophils, Absolute 0.02 0.00 - 0.30 10*3/mm3    Immature Grans, Absolute 0.05 (H) 0.00 - 0.03 10*3/mm3   Osmolality, Calculated   Result Value Ref Range    Osmolality Calc 279.6 273.0 - 305.0 mOsm/kg       Objective   Physical Exam     GENERAL APPEARANCE: Well developed, well nourished, alert and cooperative, and appears to be in no acute distress.    HEAD: normocephalic.    EYES: PERRL, EOMI. Fundi normal, vision is grossly intact.    THROAT: Oral cavity and pharynx normal. No inflammation, swelling, exudate, or lesions.     NECK: Neck supple.     CARDIAC: Normal S1 and S2. No S3, S4 or murmurs. Rhythm is regular. There is no peripheral edema, cyanosis or pallor. Extremities are warm and well perfused. Capillary refill is less than 2 seconds.  No carotid bruits.    RESPIRATORY: Clear to auscultation without rales, rhonchi, wheezing or diminished breath sounds.    GI: Positive bowel sounds. Soft, nondistended, nontender.     MUSCULOSKELETAL: No significant deformity or joint abnormality. No edema. Peripheral pulses intact. No varicosities.    NEUROLOGICAL: Strength and sensation symmetric and intact throughout.     PSYCHIATRIC: The mental examination revealed the patient was oriented to person, place, and time.       Assessment/Plan      Aspiration Pneumonia: No recent issues with aspiration pneumonia.  Encouraged him to continue modified diet and aspiration precautions.    Multiple Pulmonary nodules: CT chest was repeated in January.  Discussed with family again further evaluation of pulmonary nodule.  Family still does not want any invasive procedures for further investigation.  They state that if it were to be cancer they would not want to treat it due to her dementia, age and other comorbidities.           Hypoxia: She is currently not using oxygen anymore.  She was taken off of oxygen by her primary care provider.  She is currently 94% on room air.  As patient has dementia she was not cooperative with a 6 minute walk.  Oxygen was checked after walking from the waiting room to the exam room and was 94%.    Shortness of breath: No current issues of shortness of breath.  She currently takes no inhalers.    Sinus congestion: Continue to follow with ENT.          ICD-10-CM ICD-9-CM   1. Shortness of breath R06.02 786.05   2. Pulmonary nodules R91.8 793.19   3. Hypoxia R09.02 799.02       Return if symptoms worsen or fail to improve.

## 2019-02-06 ENCOUNTER — APPOINTMENT (OUTPATIENT)
Dept: GENERAL RADIOLOGY | Facility: HOSPITAL | Age: 84
End: 2019-02-06

## 2019-02-06 ENCOUNTER — HOSPITAL ENCOUNTER (INPATIENT)
Facility: HOSPITAL | Age: 84
LOS: 2 days | Discharge: HOME HEALTH | End: 2019-02-09
Attending: INTERNAL MEDICINE | Admitting: INTERNAL MEDICINE

## 2019-02-06 ENCOUNTER — APPOINTMENT (OUTPATIENT)
Dept: CT IMAGING | Facility: HOSPITAL | Age: 84
End: 2019-02-06

## 2019-02-06 DIAGNOSIS — R54 ADVANCED AGE: Chronic | ICD-10-CM

## 2019-02-06 DIAGNOSIS — F03.91 DEMENTIA WITH BEHAVIORAL DISTURBANCE, UNSPECIFIED DEMENTIA TYPE: Primary | Chronic | ICD-10-CM

## 2019-02-06 LAB
ALBUMIN SERPL-MCNC: 3.9 G/DL (ref 3.4–4.8)
ALBUMIN/GLOB SERPL: 1.7 G/DL (ref 1.5–2.5)
ALP SERPL-CCNC: 62 U/L (ref 35–104)
ALT SERPL W P-5'-P-CCNC: 8 U/L (ref 10–36)
ANION GAP SERPL CALCULATED.3IONS-SCNC: 9.6 MMOL/L (ref 3.6–11.2)
ARTERIAL PATENCY WRIST A: POSITIVE
AST SERPL-CCNC: 9 U/L (ref 10–30)
ATMOSPHERIC PRESS: 726 MMHG
BASE EXCESS BLDA CALC-SCNC: 0.6 MMOL/L
BASOPHILS # BLD AUTO: 0.02 10*3/MM3 (ref 0–0.3)
BASOPHILS NFR BLD AUTO: 0.1 % (ref 0–2)
BDY SITE: ABNORMAL
BILIRUB SERPL-MCNC: 0.7 MG/DL (ref 0.2–1.8)
BNP SERPL-MCNC: 70 PG/ML (ref 0–100)
BODY TEMPERATURE: 98.6 C
BUN BLD-MCNC: 14 MG/DL (ref 7–21)
BUN/CREAT SERPL: 19.2 (ref 7–25)
CALCIUM SPEC-SCNC: 8.8 MG/DL (ref 7.7–10)
CHLORIDE SERPL-SCNC: 104 MMOL/L (ref 99–112)
CO2 SERPL-SCNC: 26.4 MMOL/L (ref 24.3–31.9)
COHGB MFR BLD: 1.2 % (ref 0–5)
CREAT BLD-MCNC: 0.73 MG/DL (ref 0.43–1.29)
CRP SERPL-MCNC: 6.99 MG/DL (ref 0–0.99)
D-LACTATE SERPL-SCNC: 1 MMOL/L (ref 0.5–2)
DEPRECATED RDW RBC AUTO: 52.6 FL (ref 37–54)
EOSINOPHIL # BLD AUTO: 0.02 10*3/MM3 (ref 0–0.7)
EOSINOPHIL NFR BLD AUTO: 0.1 % (ref 0–7)
ERYTHROCYTE [DISTWIDTH] IN BLOOD BY AUTOMATED COUNT: 16.3 % (ref 11.5–14.5)
GFR SERPL CREATININE-BSD FRML MDRD: 76 ML/MIN/1.73
GLOBULIN UR ELPH-MCNC: 2.3 GM/DL
GLUCOSE BLD-MCNC: 140 MG/DL (ref 70–110)
GLUCOSE BLDC GLUCOMTR-MCNC: 135 MG/DL (ref 70–130)
HBA1C MFR BLD: 6.6 % (ref 4.5–5.7)
HCO3 BLDA-SCNC: 23.9 MMOL/L (ref 22–26)
HCT VFR BLD AUTO: 39.9 % (ref 37–47)
HCT VFR BLD CALC: 40 % (ref 37–47)
HGB BLD-MCNC: 12.6 G/DL (ref 12–16)
HGB BLDA-MCNC: 13.7 G/DL (ref 12–16)
HOROWITZ INDEX BLD+IHG-RTO: 21 %
IMM GRANULOCYTES # BLD AUTO: 0.04 10*3/MM3 (ref 0–0.03)
IMM GRANULOCYTES NFR BLD AUTO: 0.3 % (ref 0–0.5)
LYMPHOCYTES # BLD AUTO: 2.4 10*3/MM3 (ref 1–3)
LYMPHOCYTES NFR BLD AUTO: 16.5 % (ref 16–46)
M PNEUMO IGM SER QL: NEGATIVE
MAGNESIUM SERPL-MCNC: 1.5 MG/DL (ref 1.7–2.6)
MCH RBC QN AUTO: 28.3 PG (ref 27–33)
MCHC RBC AUTO-ENTMCNC: 31.6 G/DL (ref 33–37)
MCV RBC AUTO: 89.5 FL (ref 80–94)
METHGB BLD QL: 0.3 % (ref 0–3)
MODALITY: ABNORMAL
MONOCYTES # BLD AUTO: 1.19 10*3/MM3 (ref 0.1–0.9)
MONOCYTES NFR BLD AUTO: 8.2 % (ref 0–12)
NEUTROPHILS # BLD AUTO: 10.89 10*3/MM3 (ref 1.4–6.5)
NEUTROPHILS NFR BLD AUTO: 74.8 % (ref 40–75)
OSMOLALITY SERPL CALC.SUM OF ELEC: 282.2 MOSM/KG (ref 273–305)
OXYHGB MFR BLDV: 89.6 % (ref 85–100)
PCO2 BLDA: 34.3 MM HG (ref 35–45)
PH BLDA: 7.46 PH UNITS (ref 7.35–7.45)
PLATELET # BLD AUTO: 241 10*3/MM3 (ref 130–400)
PMV BLD AUTO: 10.4 FL (ref 6–10)
PO2 BLDA: 103 MM HG (ref 80–100)
POTASSIUM BLD-SCNC: 3.6 MMOL/L (ref 3.5–5.3)
PROT SERPL-MCNC: 6.2 G/DL (ref 6–8)
RBC # BLD AUTO: 4.46 10*6/MM3 (ref 4.2–5.4)
SAO2 % BLDCOA: 91 % (ref 90–100)
SODIUM BLD-SCNC: 140 MMOL/L (ref 135–153)
TROPONIN I SERPL-MCNC: 0.01 NG/ML
TSH SERPL DL<=0.05 MIU/L-ACNC: 0.84 MIU/ML (ref 0.55–4.78)
WBC NRBC COR # BLD: 14.56 10*3/MM3 (ref 4.5–12.5)

## 2019-02-06 PROCEDURE — 82805 BLOOD GASES W/O2 SATURATION: CPT | Performed by: PHYSICIAN ASSISTANT

## 2019-02-06 PROCEDURE — 99223 1ST HOSP IP/OBS HIGH 75: CPT | Performed by: HOSPITALIST

## 2019-02-06 PROCEDURE — 94799 UNLISTED PULMONARY SVC/PX: CPT

## 2019-02-06 PROCEDURE — 84484 ASSAY OF TROPONIN QUANT: CPT | Performed by: PHYSICIAN ASSISTANT

## 2019-02-06 PROCEDURE — 36600 WITHDRAWAL OF ARTERIAL BLOOD: CPT | Performed by: PHYSICIAN ASSISTANT

## 2019-02-06 PROCEDURE — 25010000002 CEFTRIAXONE

## 2019-02-06 PROCEDURE — 70450 CT HEAD/BRAIN W/O DYE: CPT | Performed by: RADIOLOGY

## 2019-02-06 PROCEDURE — 71045 X-RAY EXAM CHEST 1 VIEW: CPT | Performed by: RADIOLOGY

## 2019-02-06 PROCEDURE — 93005 ELECTROCARDIOGRAM TRACING: CPT | Performed by: PHYSICIAN ASSISTANT

## 2019-02-06 PROCEDURE — 80053 COMPREHEN METABOLIC PANEL: CPT | Performed by: PHYSICIAN ASSISTANT

## 2019-02-06 PROCEDURE — 71045 X-RAY EXAM CHEST 1 VIEW: CPT

## 2019-02-06 PROCEDURE — 87040 BLOOD CULTURE FOR BACTERIA: CPT | Performed by: HOSPITALIST

## 2019-02-06 PROCEDURE — 71250 CT THORAX DX C-: CPT | Performed by: RADIOLOGY

## 2019-02-06 PROCEDURE — 82962 GLUCOSE BLOOD TEST: CPT

## 2019-02-06 PROCEDURE — 83036 HEMOGLOBIN GLYCOSYLATED A1C: CPT | Performed by: PHYSICIAN ASSISTANT

## 2019-02-06 PROCEDURE — 83050 HGB METHEMOGLOBIN QUAN: CPT | Performed by: PHYSICIAN ASSISTANT

## 2019-02-06 PROCEDURE — 82375 ASSAY CARBOXYHB QUANT: CPT | Performed by: PHYSICIAN ASSISTANT

## 2019-02-06 PROCEDURE — 83880 ASSAY OF NATRIURETIC PEPTIDE: CPT | Performed by: HOSPITALIST

## 2019-02-06 PROCEDURE — 84443 ASSAY THYROID STIM HORMONE: CPT | Performed by: PHYSICIAN ASSISTANT

## 2019-02-06 PROCEDURE — 85025 COMPLETE CBC W/AUTO DIFF WBC: CPT | Performed by: PHYSICIAN ASSISTANT

## 2019-02-06 PROCEDURE — 71250 CT THORAX DX C-: CPT

## 2019-02-06 PROCEDURE — 86738 MYCOPLASMA ANTIBODY: CPT | Performed by: PHYSICIAN ASSISTANT

## 2019-02-06 PROCEDURE — 70450 CT HEAD/BRAIN W/O DYE: CPT

## 2019-02-06 PROCEDURE — 83605 ASSAY OF LACTIC ACID: CPT | Performed by: PHYSICIAN ASSISTANT

## 2019-02-06 PROCEDURE — 86140 C-REACTIVE PROTEIN: CPT | Performed by: PHYSICIAN ASSISTANT

## 2019-02-06 PROCEDURE — 83735 ASSAY OF MAGNESIUM: CPT | Performed by: PHYSICIAN ASSISTANT

## 2019-02-06 PROCEDURE — 93010 ELECTROCARDIOGRAM REPORT: CPT | Performed by: INTERNAL MEDICINE

## 2019-02-06 RX ORDER — LEVOTHYROXINE SODIUM 0.05 MG/1
50 TABLET ORAL DAILY
Status: DISCONTINUED | OUTPATIENT
Start: 2019-02-07 | End: 2019-02-09 | Stop reason: HOSPADM

## 2019-02-06 RX ORDER — FUROSEMIDE 40 MG/1
40 TABLET ORAL DAILY
Status: CANCELLED | OUTPATIENT
Start: 2019-02-07

## 2019-02-06 RX ORDER — ACETAMINOPHEN 325 MG/1
650 TABLET ORAL EVERY 4 HOURS PRN
Status: CANCELLED | OUTPATIENT
Start: 2019-02-06

## 2019-02-06 RX ORDER — POTASSIUM CHLORIDE 750 MG/1
10 TABLET, FILM COATED, EXTENDED RELEASE ORAL 2 TIMES DAILY WITH MEALS
Status: CANCELLED | OUTPATIENT
Start: 2019-02-06

## 2019-02-06 RX ORDER — SODIUM CHLORIDE 9 MG/ML
75 INJECTION, SOLUTION INTRAVENOUS CONTINUOUS
Status: DISCONTINUED | OUTPATIENT
Start: 2019-02-06 | End: 2019-02-08

## 2019-02-06 RX ORDER — NITROFURANTOIN 25; 75 MG/1; MG/1
100 CAPSULE ORAL DAILY
COMMUNITY

## 2019-02-06 RX ORDER — RIVASTIGMINE 9.5 MG/24H
1 PATCH, EXTENDED RELEASE TRANSDERMAL DAILY
Status: DISCONTINUED | OUTPATIENT
Start: 2019-02-07 | End: 2019-02-09 | Stop reason: HOSPADM

## 2019-02-06 RX ORDER — SODIUM CHLORIDE 0.9 % (FLUSH) 0.9 %
1-10 SYRINGE (ML) INJECTION AS NEEDED
Status: DISCONTINUED | OUTPATIENT
Start: 2019-02-06 | End: 2019-02-09 | Stop reason: HOSPADM

## 2019-02-06 RX ORDER — ISOSORBIDE MONONITRATE 30 MG/1
30 TABLET, EXTENDED RELEASE ORAL DAILY
Status: CANCELLED | OUTPATIENT
Start: 2019-02-07

## 2019-02-06 RX ORDER — CARVEDILOL 6.25 MG/1
6.25 TABLET ORAL 2 TIMES DAILY WITH MEALS
Status: CANCELLED | OUTPATIENT
Start: 2019-02-06

## 2019-02-06 RX ORDER — DEXTROSE MONOHYDRATE 25 G/50ML
25 INJECTION, SOLUTION INTRAVENOUS
Status: DISCONTINUED | OUTPATIENT
Start: 2019-02-06 | End: 2019-02-09 | Stop reason: HOSPADM

## 2019-02-06 RX ORDER — CITALOPRAM 20 MG/1
20 TABLET ORAL DAILY
Status: DISCONTINUED | OUTPATIENT
Start: 2019-02-07 | End: 2019-02-09 | Stop reason: HOSPADM

## 2019-02-06 RX ORDER — NICOTINE POLACRILEX 4 MG
15 LOZENGE BUCCAL
Status: DISCONTINUED | OUTPATIENT
Start: 2019-02-06 | End: 2019-02-09 | Stop reason: HOSPADM

## 2019-02-06 RX ORDER — SODIUM CHLORIDE 0.9 % (FLUSH) 0.9 %
3 SYRINGE (ML) INJECTION EVERY 12 HOURS SCHEDULED
Status: DISCONTINUED | OUTPATIENT
Start: 2019-02-06 | End: 2019-02-09 | Stop reason: HOSPADM

## 2019-02-06 RX ORDER — PANTOPRAZOLE SODIUM 40 MG/1
40 TABLET, DELAYED RELEASE ORAL EVERY MORNING
Status: CANCELLED | OUTPATIENT
Start: 2019-02-07

## 2019-02-06 RX ORDER — DOCUSATE SODIUM 100 MG/1
200 CAPSULE, LIQUID FILLED ORAL DAILY PRN
Status: DISCONTINUED | OUTPATIENT
Start: 2019-02-06 | End: 2019-02-09 | Stop reason: HOSPADM

## 2019-02-06 RX ORDER — NITROFURANTOIN 25; 75 MG/1; MG/1
100 CAPSULE ORAL DAILY
Status: CANCELLED | OUTPATIENT
Start: 2019-02-07 | End: 2020-02-07

## 2019-02-06 RX ORDER — I-VITE, TAB 1000-60-2MG (60/BT) 300MCG-200
1 TAB ORAL DAILY
Status: DISCONTINUED | OUTPATIENT
Start: 2019-02-07 | End: 2019-02-09 | Stop reason: HOSPADM

## 2019-02-06 RX ORDER — HYDRALAZINE HYDROCHLORIDE 20 MG/ML
10 INJECTION INTRAMUSCULAR; INTRAVENOUS EVERY 6 HOURS PRN
Status: DISCONTINUED | OUTPATIENT
Start: 2019-02-06 | End: 2019-02-09 | Stop reason: HOSPADM

## 2019-02-06 RX ORDER — NITROGLYCERIN 0.4 MG/1
0.4 TABLET SUBLINGUAL
Status: DISCONTINUED | OUTPATIENT
Start: 2019-02-06 | End: 2019-02-09 | Stop reason: HOSPADM

## 2019-02-06 RX ORDER — FERROUS SULFATE 325(65) MG
325 TABLET ORAL
Status: DISCONTINUED | OUTPATIENT
Start: 2019-02-07 | End: 2019-02-09 | Stop reason: HOSPADM

## 2019-02-06 RX ORDER — CLOPIDOGREL BISULFATE 75 MG/1
75 TABLET ORAL DAILY
Status: DISCONTINUED | OUTPATIENT
Start: 2019-02-07 | End: 2019-02-09 | Stop reason: HOSPADM

## 2019-02-06 RX ORDER — PANTOPRAZOLE SODIUM 40 MG/1
40 TABLET, DELAYED RELEASE ORAL EVERY MORNING
Status: DISCONTINUED | OUTPATIENT
Start: 2019-02-07 | End: 2019-02-09 | Stop reason: HOSPADM

## 2019-02-06 RX ADMIN — SODIUM CHLORIDE 75 ML/HR: 9 INJECTION, SOLUTION INTRAVENOUS at 22:08

## 2019-02-06 RX ADMIN — DOXYCYCLINE 100 MG: 100 INJECTION, POWDER, LYOPHILIZED, FOR SOLUTION INTRAVENOUS at 23:29

## 2019-02-06 RX ADMIN — CEFTRIAXONE 1 G: 1 INJECTION, POWDER, FOR SOLUTION INTRAMUSCULAR; INTRAVENOUS at 22:08

## 2019-02-06 RX ADMIN — METRONIDAZOLE 500 MG: 500 INJECTION, SOLUTION INTRAVENOUS at 22:08

## 2019-02-06 RX ADMIN — SODIUM CHLORIDE, PRESERVATIVE FREE 3 ML: 5 INJECTION INTRAVENOUS at 22:02

## 2019-02-07 ENCOUNTER — APPOINTMENT (OUTPATIENT)
Dept: GENERAL RADIOLOGY | Facility: HOSPITAL | Age: 84
End: 2019-02-07

## 2019-02-07 ENCOUNTER — APPOINTMENT (OUTPATIENT)
Dept: CARDIOLOGY | Facility: HOSPITAL | Age: 84
End: 2019-02-07

## 2019-02-07 PROBLEM — A41.9 SEPSIS (HCC): Status: ACTIVE | Noted: 2019-02-07

## 2019-02-07 LAB
ALBUMIN SERPL-MCNC: 3.3 G/DL (ref 3.4–4.8)
ALBUMIN/GLOB SERPL: 1.5 G/DL (ref 1.5–2.5)
ALP SERPL-CCNC: 52 U/L (ref 35–104)
ALT SERPL W P-5'-P-CCNC: 5 U/L (ref 10–36)
ANION GAP SERPL CALCULATED.3IONS-SCNC: 6.8 MMOL/L (ref 3.6–11.2)
AST SERPL-CCNC: 9 U/L (ref 10–30)
BASOPHILS # BLD AUTO: 0.04 10*3/MM3 (ref 0–0.3)
BASOPHILS NFR BLD AUTO: 0.4 % (ref 0–2)
BH CV ECHO MEAS - % IVS THICK: 38.9 %
BH CV ECHO MEAS - % LVPW THICK: 52.7 %
BH CV ECHO MEAS - ACS: 2 CM
BH CV ECHO MEAS - AO MAX PG: 8.6 MMHG
BH CV ECHO MEAS - AO MEAN PG: 4 MMHG
BH CV ECHO MEAS - AO ROOT AREA (BSA CORRECTED): 2.1
BH CV ECHO MEAS - AO ROOT AREA: 7.5 CM^2
BH CV ECHO MEAS - AO ROOT DIAM: 3.1 CM
BH CV ECHO MEAS - AO V2 MAX: 146.6 CM/SEC
BH CV ECHO MEAS - AO V2 MEAN: 92.7 CM/SEC
BH CV ECHO MEAS - AO V2 VTI: 29.4 CM
BH CV ECHO MEAS - BSA(HAYCOCK): 1.5 M^2
BH CV ECHO MEAS - BSA: 1.5 M^2
BH CV ECHO MEAS - BZI_BMI: 28 KILOGRAMS/M^2
BH CV ECHO MEAS - BZI_METRIC_HEIGHT: 142.2 CM
BH CV ECHO MEAS - BZI_METRIC_WEIGHT: 56.7 KG
BH CV ECHO MEAS - EDV(CUBED): 87.7 ML
BH CV ECHO MEAS - EDV(MOD-SP4): 32 ML
BH CV ECHO MEAS - EDV(TEICH): 89.7 ML
BH CV ECHO MEAS - EF(CUBED): 75.6 %
BH CV ECHO MEAS - EF(MOD-SP4): 75 %
BH CV ECHO MEAS - EF(TEICH): 67.7 %
BH CV ECHO MEAS - ESV(CUBED): 21.4 ML
BH CV ECHO MEAS - ESV(MOD-SP4): 8 ML
BH CV ECHO MEAS - ESV(TEICH): 29 ML
BH CV ECHO MEAS - FS: 37.5 %
BH CV ECHO MEAS - IVS/LVPW: 1.1
BH CV ECHO MEAS - IVSD: 1.1 CM
BH CV ECHO MEAS - IVSS: 1.5 CM
BH CV ECHO MEAS - LA DIMENSION: 3.9 CM
BH CV ECHO MEAS - LA/AO: 1.3
BH CV ECHO MEAS - LV DIASTOLIC VOL/BSA (35-75): 22 ML/M^2
BH CV ECHO MEAS - LV MASS(C)D: 150.2 GRAMS
BH CV ECHO MEAS - LV MASS(C)DI: 103.3 GRAMS/M^2
BH CV ECHO MEAS - LV MASS(C)S: 135.7 GRAMS
BH CV ECHO MEAS - LV MASS(C)SI: 93.3 GRAMS/M^2
BH CV ECHO MEAS - LV SYSTOLIC VOL/BSA (12-30): 5.5 ML/M^2
BH CV ECHO MEAS - LVIDD: 4.4 CM
BH CV ECHO MEAS - LVIDS: 2.8 CM
BH CV ECHO MEAS - LVLD AP4: 5.2 CM
BH CV ECHO MEAS - LVLS AP4: 4.3 CM
BH CV ECHO MEAS - LVOT AREA (M): 2.8 CM^2
BH CV ECHO MEAS - LVOT AREA: 2.7 CM^2
BH CV ECHO MEAS - LVOT DIAM: 1.9 CM
BH CV ECHO MEAS - LVPWD: 0.94 CM
BH CV ECHO MEAS - LVPWS: 1.4 CM
BH CV ECHO MEAS - MV A MAX VEL: 88.8 CM/SEC
BH CV ECHO MEAS - MV E MAX VEL: 69.6 CM/SEC
BH CV ECHO MEAS - MV E/A: 0.78
BH CV ECHO MEAS - PA ACC SLOPE: 795.1 CM/SEC^2
BH CV ECHO MEAS - PA ACC TIME: 0.11 SEC
BH CV ECHO MEAS - PA PR(ACCEL): 28.3 MMHG
BH CV ECHO MEAS - RAP SYSTOLE: 10 MMHG
BH CV ECHO MEAS - RVSP: 38.3 MMHG
BH CV ECHO MEAS - SI(AO): 150.6 ML/M^2
BH CV ECHO MEAS - SI(CUBED): 45.6 ML/M^2
BH CV ECHO MEAS - SI(MOD-SP4): 16.5 ML/M^2
BH CV ECHO MEAS - SI(TEICH): 41.8 ML/M^2
BH CV ECHO MEAS - SV(AO): 219 ML
BH CV ECHO MEAS - SV(CUBED): 66.3 ML
BH CV ECHO MEAS - SV(MOD-SP4): 24 ML
BH CV ECHO MEAS - SV(TEICH): 60.8 ML
BH CV ECHO MEAS - TR MAX VEL: 266 CM/SEC
BILIRUB SERPL-MCNC: 0.5 MG/DL (ref 0.2–1.8)
BILIRUB UR QL STRIP: NEGATIVE
BUN BLD-MCNC: 13 MG/DL (ref 7–21)
BUN/CREAT SERPL: 22.4 (ref 7–25)
CALCIUM SPEC-SCNC: 8.4 MG/DL (ref 7.7–10)
CHLORIDE SERPL-SCNC: 108 MMOL/L (ref 99–112)
CLARITY UR: CLEAR
CO2 SERPL-SCNC: 25.2 MMOL/L (ref 24.3–31.9)
COLOR UR: ABNORMAL
CREAT BLD-MCNC: 0.58 MG/DL (ref 0.43–1.29)
DEPRECATED RDW RBC AUTO: 53.2 FL (ref 37–54)
EOSINOPHIL # BLD AUTO: 0.04 10*3/MM3 (ref 0–0.7)
EOSINOPHIL NFR BLD AUTO: 0.4 % (ref 0–7)
ERYTHROCYTE [DISTWIDTH] IN BLOOD BY AUTOMATED COUNT: 16.2 % (ref 11.5–14.5)
GFR SERPL CREATININE-BSD FRML MDRD: 99 ML/MIN/1.73
GLOBULIN UR ELPH-MCNC: 2.2 GM/DL
GLUCOSE BLD-MCNC: 125 MG/DL (ref 70–110)
GLUCOSE BLDC GLUCOMTR-MCNC: 106 MG/DL (ref 70–130)
GLUCOSE BLDC GLUCOMTR-MCNC: 112 MG/DL (ref 70–130)
GLUCOSE BLDC GLUCOMTR-MCNC: 119 MG/DL (ref 70–130)
GLUCOSE BLDC GLUCOMTR-MCNC: 153 MG/DL (ref 70–130)
GLUCOSE BLDC GLUCOMTR-MCNC: 205 MG/DL (ref 70–130)
GLUCOSE UR STRIP-MCNC: NEGATIVE MG/DL
HCT VFR BLD AUTO: 36.6 % (ref 37–47)
HGB BLD-MCNC: 11.8 G/DL (ref 12–16)
HGB UR QL STRIP.AUTO: NEGATIVE
IMM GRANULOCYTES # BLD AUTO: 0.02 10*3/MM3 (ref 0–0.03)
IMM GRANULOCYTES NFR BLD AUTO: 0.2 % (ref 0–0.5)
KETONES UR QL STRIP: ABNORMAL
L PNEUMO1 AG UR QL IA: NEGATIVE
LEUKOCYTE ESTERASE UR QL STRIP.AUTO: NEGATIVE
LYMPHOCYTES # BLD AUTO: 2.26 10*3/MM3 (ref 1–3)
LYMPHOCYTES NFR BLD AUTO: 23.4 % (ref 16–46)
MAXIMAL PREDICTED HEART RATE: 136 BPM
MCH RBC QN AUTO: 29.2 PG (ref 27–33)
MCHC RBC AUTO-ENTMCNC: 32.2 G/DL (ref 33–37)
MCV RBC AUTO: 90.6 FL (ref 80–94)
MONOCYTES # BLD AUTO: 1.28 10*3/MM3 (ref 0.1–0.9)
MONOCYTES NFR BLD AUTO: 13.3 % (ref 0–12)
NEUTROPHILS # BLD AUTO: 6 10*3/MM3 (ref 1.4–6.5)
NEUTROPHILS NFR BLD AUTO: 62.3 % (ref 40–75)
NITRITE UR QL STRIP: NEGATIVE
OSMOLALITY SERPL CALC.SUM OF ELEC: 281 MOSM/KG (ref 273–305)
PH UR STRIP.AUTO: <=5 [PH] (ref 5–8)
PLATELET # BLD AUTO: 227 10*3/MM3 (ref 130–400)
PMV BLD AUTO: 10.4 FL (ref 6–10)
POTASSIUM BLD-SCNC: 3.5 MMOL/L (ref 3.5–5.3)
PROT SERPL-MCNC: 5.5 G/DL (ref 6–8)
PROT UR QL STRIP: NEGATIVE
RBC # BLD AUTO: 4.04 10*6/MM3 (ref 4.2–5.4)
SODIUM BLD-SCNC: 140 MMOL/L (ref 135–153)
SP GR UR STRIP: 1.02 (ref 1–1.03)
STRESS TARGET HR: 116 BPM
UROBILINOGEN UR QL STRIP: ABNORMAL
WBC NRBC COR # BLD: 9.64 10*3/MM3 (ref 4.5–12.5)

## 2019-02-07 PROCEDURE — 74230 X-RAY XM SWLNG FUNCJ C+: CPT

## 2019-02-07 PROCEDURE — 25010000002 CEFTRIAXONE

## 2019-02-07 PROCEDURE — 92611 MOTION FLUOROSCOPY/SWALLOW: CPT

## 2019-02-07 PROCEDURE — 94799 UNLISTED PULMONARY SVC/PX: CPT

## 2019-02-07 PROCEDURE — 63710000001 INSULIN ASPART PER 5 UNITS: Performed by: PHYSICIAN ASSISTANT

## 2019-02-07 PROCEDURE — 87899 AGENT NOS ASSAY W/OPTIC: CPT | Performed by: PHYSICIAN ASSISTANT

## 2019-02-07 PROCEDURE — 74230 X-RAY XM SWLNG FUNCJ C+: CPT | Performed by: RADIOLOGY

## 2019-02-07 PROCEDURE — 93306 TTE W/DOPPLER COMPLETE: CPT | Performed by: INTERNAL MEDICINE

## 2019-02-07 PROCEDURE — 99233 SBSQ HOSP IP/OBS HIGH 50: CPT | Performed by: INTERNAL MEDICINE

## 2019-02-07 PROCEDURE — 82962 GLUCOSE BLOOD TEST: CPT

## 2019-02-07 PROCEDURE — 85025 COMPLETE CBC W/AUTO DIFF WBC: CPT | Performed by: HOSPITALIST

## 2019-02-07 PROCEDURE — 81003 URINALYSIS AUTO W/O SCOPE: CPT | Performed by: PHYSICIAN ASSISTANT

## 2019-02-07 PROCEDURE — 80053 COMPREHEN METABOLIC PANEL: CPT | Performed by: HOSPITALIST

## 2019-02-07 PROCEDURE — 93306 TTE W/DOPPLER COMPLETE: CPT

## 2019-02-07 PROCEDURE — 25010000002 MAGNESIUM SULFATE 2 GM/50ML SOLUTION: Performed by: INTERNAL MEDICINE

## 2019-02-07 RX ORDER — POTASSIUM CHLORIDE 7.45 MG/ML
10 INJECTION INTRAVENOUS
Status: DISCONTINUED | OUTPATIENT
Start: 2019-02-07 | End: 2019-02-09 | Stop reason: HOSPADM

## 2019-02-07 RX ORDER — MAGNESIUM SULFATE HEPTAHYDRATE 40 MG/ML
4 INJECTION, SOLUTION INTRAVENOUS AS NEEDED
Status: DISCONTINUED | OUTPATIENT
Start: 2019-02-07 | End: 2019-02-09 | Stop reason: HOSPADM

## 2019-02-07 RX ORDER — MAGNESIUM SULFATE HEPTAHYDRATE 40 MG/ML
2 INJECTION, SOLUTION INTRAVENOUS AS NEEDED
Status: DISCONTINUED | OUTPATIENT
Start: 2019-02-07 | End: 2019-02-09 | Stop reason: HOSPADM

## 2019-02-07 RX ORDER — L.ACID,PARA/B.BIFIDUM/S.THERM 8B CELL
1 CAPSULE ORAL DAILY
Status: DISCONTINUED | OUTPATIENT
Start: 2019-02-07 | End: 2019-02-09 | Stop reason: HOSPADM

## 2019-02-07 RX ORDER — GUAIFENESIN 600 MG/1
1200 TABLET, EXTENDED RELEASE ORAL EVERY 12 HOURS SCHEDULED
Status: DISCONTINUED | OUTPATIENT
Start: 2019-02-07 | End: 2019-02-09 | Stop reason: HOSPADM

## 2019-02-07 RX ORDER — POTASSIUM CHLORIDE 750 MG/1
40 TABLET, FILM COATED, EXTENDED RELEASE ORAL AS NEEDED
Status: DISCONTINUED | OUTPATIENT
Start: 2019-02-07 | End: 2019-02-09 | Stop reason: HOSPADM

## 2019-02-07 RX ORDER — MAGNESIUM SULFATE HEPTAHYDRATE 40 MG/ML
2 INJECTION, SOLUTION INTRAVENOUS ONCE
Status: COMPLETED | OUTPATIENT
Start: 2019-02-07 | End: 2019-02-07

## 2019-02-07 RX ORDER — POTASSIUM CHLORIDE 20 MEQ/1
40 TABLET, EXTENDED RELEASE ORAL EVERY 4 HOURS
Status: COMPLETED | OUTPATIENT
Start: 2019-02-07 | End: 2019-02-07

## 2019-02-07 RX ORDER — POTASSIUM CHLORIDE 1.5 G/1.77G
40 POWDER, FOR SOLUTION ORAL AS NEEDED
Status: DISCONTINUED | OUTPATIENT
Start: 2019-02-07 | End: 2019-02-09 | Stop reason: HOSPADM

## 2019-02-07 RX ADMIN — I-VITE, TAB 1000-60-2MG (60/BT) 1 TABLET: TAB at 16:08

## 2019-02-07 RX ADMIN — FERROUS SULFATE TAB 325 MG (65 MG ELEMENTAL FE) 325 MG: 325 (65 FE) TAB at 16:10

## 2019-02-07 RX ADMIN — METRONIDAZOLE 500 MG: 500 INJECTION, SOLUTION INTRAVENOUS at 22:00

## 2019-02-07 RX ADMIN — Medication 1 CAPSULE: at 17:30

## 2019-02-07 RX ADMIN — DOXYCYCLINE 100 MG: 100 INJECTION, POWDER, LYOPHILIZED, FOR SOLUTION INTRAVENOUS at 23:35

## 2019-02-07 RX ADMIN — RIVAROXABAN 15 MG: 15 TABLET, FILM COATED ORAL at 17:31

## 2019-02-07 RX ADMIN — MAGNESIUM SULFATE IN WATER 2 G: 40 INJECTION, SOLUTION INTRAVENOUS at 16:00

## 2019-02-07 RX ADMIN — LEVOTHYROXINE SODIUM 50 MCG: 50 TABLET ORAL at 16:10

## 2019-02-07 RX ADMIN — CLOPIDOGREL 75 MG: 75 TABLET, FILM COATED ORAL at 16:09

## 2019-02-07 RX ADMIN — POTASSIUM CHLORIDE 40 MEQ: 1500 TABLET, EXTENDED RELEASE ORAL at 20:46

## 2019-02-07 RX ADMIN — SODIUM CHLORIDE 75 ML/HR: 9 INJECTION, SOLUTION INTRAVENOUS at 16:45

## 2019-02-07 RX ADMIN — METRONIDAZOLE 500 MG: 500 INJECTION, SOLUTION INTRAVENOUS at 14:51

## 2019-02-07 RX ADMIN — CITALOPRAM HYDROBROMIDE 20 MG: 20 TABLET ORAL at 16:09

## 2019-02-07 RX ADMIN — POTASSIUM CHLORIDE 40 MEQ: 1500 TABLET, EXTENDED RELEASE ORAL at 17:30

## 2019-02-07 RX ADMIN — SODIUM CHLORIDE, PRESERVATIVE FREE 3 ML: 5 INJECTION INTRAVENOUS at 20:49

## 2019-02-07 RX ADMIN — INSULIN ASPART 3 UNITS: 100 INJECTION, SOLUTION INTRAVENOUS; SUBCUTANEOUS at 20:49

## 2019-02-07 RX ADMIN — DOXYCYCLINE 100 MG: 100 INJECTION, POWDER, LYOPHILIZED, FOR SOLUTION INTRAVENOUS at 12:17

## 2019-02-07 RX ADMIN — INSULIN ASPART 2 UNITS: 100 INJECTION, SOLUTION INTRAVENOUS; SUBCUTANEOUS at 17:35

## 2019-02-07 RX ADMIN — GUAIFENESIN 1200 MG: 600 TABLET, EXTENDED RELEASE ORAL at 20:47

## 2019-02-07 RX ADMIN — METRONIDAZOLE 500 MG: 500 INJECTION, SOLUTION INTRAVENOUS at 04:57

## 2019-02-07 RX ADMIN — PANTOPRAZOLE SODIUM 40 MG: 40 TABLET, DELAYED RELEASE ORAL at 16:09

## 2019-02-07 RX ADMIN — CEFTRIAXONE 1 G: 1 INJECTION, POWDER, FOR SOLUTION INTRAMUSCULAR; INTRAVENOUS at 20:47

## 2019-02-07 RX ADMIN — RIVASTIGMINE 1 PATCH: 9.5 PATCH, EXTENDED RELEASE TRANSDERMAL at 12:05

## 2019-02-07 NOTE — PHARMACY PATIENT ASSISTANCE
Pharmacy checked on price of home medication Xarelto. Patient last filled 90 day supply via mail order in November. Test claim at [facility] showed copay of $0. No issues at this time.    Marcelina Blood, Pharmacy Intern  02/07/19  2:32 PM

## 2019-02-07 NOTE — PROGRESS NOTES
Discharge Planning Assessment   [place]     Patient Name: Floridalma Bryant  MRN: [id number]  Today's Date: 2/7/2019    Admit Date: 2/6/2019    Discharge Needs Assessment     Row Name 02/07/19 1050       Living Environment    Lives With  spouse Pt lives at home with her spouse.     Current Living Arrangements  home/apartment/condo    Primary Care Provided by  other (see comments);child(edstiny) Pt has a paid caregiver in the home 8 hours a day/5 days a week and family provides care at night and on the weekends.     Provides Primary Care For  no one, unable/limited ability to care for self    Family Caregiver if Needed  child(destiny), adult    Quality of Family Relationships  supportive;involved;helpful    Able to Return to Prior Arrangements  yes    Living Arrangement Comments  -- Pt has good family support. Pt's family report they do good with pt at home.        Transition Planning    Patient/Family Anticipates Transition to  home    Patient/Family Anticipated Services at Transition  home health care    Transportation Anticipated  family or friend will provide       Discharge Needs Assessment    Equipment Currently Used at Home  cane, straight;wheelchair;walker, rolling;shower chair;hospital bed via [facility].     Equipment Needed After Discharge  commode Pt's family request a bed side commode to be arranged at discharge.     Outpatient/Agency/Support Group Needs  homecare agency Pt utilizes [facility].     Discharge Facility/Level of Care Needs  home with home health [facility] to be contacted at discharge and made aware.         Discharge Plan     Row Name 02/07/19 1056       Plan    Plan  Pt lives at home with her spouse and will return at discharge. Pt has a paid caregiver in the home 8 hours a day/5 days a week. Family provides care at night and on the weekends. Pt's daughter states pt was in a nursing home for a few months and was not happy. Pt's daughter states she does much better at  home. Pt utilizes [facility]. [facility] to be contacted at discharge and made aware. Pt has a cane, wheelchair, rolling walker, hospital bed, and shower chair via [facility]. Pt's family request a bed side commode to be arranged at discharge. Bed side commode to be arranged with MD order. Pt's PCP is Nicola Resendiz. Pt's POA is her daughter, Jaz Loredo. Pt's family states pt has an advance directive, but a copy is not on file. Pt's family to provide transportation at discharge. SS will follow and assist as needed with discharge planning.     Patient/Family in Agreement with Plan  yes     Demographic Summary     Row Name 02/07/19 1050       General Information    Admission Type  inpatient    Referral Source  nursing    Reason for Consult  discharge planning    Preferred Language  English     Used During This Interaction  no     Shanti Sam

## 2019-02-07 NOTE — PAYOR COMM NOTE
"Contact: Briseida Hyde RN @ [facility]  Phone: [phone]  Fax: [phone]    Inpatient status  Clinical       Floridalma Redmond (84 y.o. Female)     Date of Birth Social Security Number Address Home Phone MRN    1934  [address] [facility] [place], [place] [address] [phone] [id number]    Shinto Marital Status          Gnosticism        Admission Date Admission Type Admitting Provider Attending Provider Department, Room/Bed    2/7/19 Urgent Jaycob Coy, Jaycob Jeffers DO [address], 3347/1S    Discharge Date Discharge Disposition Discharge Destination                       Attending Provider:  Jaycob Coy DO    Allergies:  Codeine, Metformin And Related    Isolation:  None   Infection:  None   Code Status:  No CPR    Ht:  142.2 cm (56\")   Wt:  57 kg (125 lb 11.2 oz)    Admission Cmt:  None   Principal Problem:  None                Active Insurance as of 2/6/2019     Primary Coverage     Payor Plan Insurance Group Employer/Plan Group    HUMANA MEDICARE REPLACEMENT HUMANA MEDICARE REPL [id number]     Payor Plan Address Payor Plan Phone Number Payor Plan Fax Number Effective Dates    [address] [phone]  7/1/2018 - None Entered    [facility] [id number]       Subscriber Name Subscriber Birth Date Member ID       FLORIDALMA REDMOND 1934 [id number]           Secondary Coverage     Payor Plan Insurance Group Employer/Plan Group    [place]      Payor Plan Address Payor Plan Phone Number Payor Plan Fax Number Effective Dates    [address] [phone]  4/16/2018 - None Entered    [facility] [id number]       Subscriber Name Subscriber Birth Date Member ID       FLORIDALMA REDMOND 1934 [id number]                 Emergency Contacts      (Rel.) Home Phone Work Phone Mobile Phone    Jaz Loredo (Power of ) [phone] -- --               History & Physical      Sebastian Costa MD " at 2019  7:28 PM               [facility] HOSPITALIST HISTORY AND PHYSICAL    Patient Identification:  Name:  Floridalma Bryant  Age:  84 y.o.  Sex:  female  :  1934  MRN:  [id number]   Visit Number:  [id number]  Primary Care Physician:  Nicola Resendiz APRN     2019  6:38 PM    Subjective     Chief complaint: Cough, altered mental status.     History of presenting illness:   Ms. Jimenez is a 84 y.o. female with past medical history significant for systolic CHF with EF 40%, paroxsymal atrial fibrillation, chronic anticoagulation with Xarelto, IDDM, hypertension, dyslipidemia, recurrent UTIs, and dementia. She has known lung nodules which the family have elected to not have evaluated further due to her advanced age and dementia.   The patient was visited by home health for PT today and was noted to be lethargic and was unable to participate in therapy. Her daughter at bedside reports that she has had a decline in her baseline mental status since yesterday as well. She has a chronic cough, however, it has become more severe over the last few days with production of green sputum. She was taken to the office of her PCP today where she was noted to have a fever of 101.9F. She was tested for influenza which was negative. She had a CXR in their office showing a reported right lower lobe pneumonia. O2sats were reported to be in the low 90s on room air. Hospitalist services were contacted for direct admission. She has been accepted to the medical/surgery unit for further evaluation and therapy. The patient is currently awake but appears weak and is very heard of hearing; she cannot really give any history and is only oriented to self. She will obey simple commands and is moving all 4 extremities. At baseline, she lives at home with 24 hour care and home health with nursing care and PT. Her daughter and son-in-law live next door. She is generally able to get up and bathe with assistance and is able to  carry on some conversation and recognizes family members per her daughter at bedside.  ---------------------------------------------------------------------------------------------------------------------   Review of Systems   Unable to perform ROS: Mental status change   Respiratory: Positive for cough.    Cardiovascular: Negative for chest pain.   Gastrointestinal: Negative for abdominal distention.      ---------------------------------------------------------------------------------------------------------------------   Past Medical History:   Diagnosis Date   • Afib (CMS/[facility])    • Dementia    • Dementia    • Diabetes mellitus (CMS/[facility])    • Hypertension    • Pancreatitis    • Systolic CHF (CMS/[facility])        Past Surgical History:   Procedure Laterality Date   • ANKLE SURGERY     • HYSTERECTOMY     • SHOULDER SURGERY     • TYMPANOPLASTY       History reviewed. No pertinent family history.  Social History     Socioeconomic History   • Marital status:      Spouse name: Not on file   • Number of children: Not on file   • Years of education: Not on file   • Highest education level: Not on file   Tobacco Use   • Smoking status: Never Smoker   • Smokeless tobacco: Never Used   Substance and Sexual Activity   • Alcohol use: No   • Drug use: No   • Sexual activity: Defer     ---------------------------------------------------------------------------------------------------------------------   Allergies:  Codeine and Metformin and related  ---------------------------------------------------------------------------------------------------------------------   Prior to Admission Medications     Prescriptions Last Dose Informant Patient Reported? Taking?    acetaminophen (TYLENOL) 325 MG tablet   No No    Take 2 tablets by mouth Every 4 (Four) Hours As Needed for Headache or Fever (temperature greater than 101.5 F).    atorvastatin (LIPITOR) 80 MG tablet   No No    Take 1 tablet by mouth Every Night.    busPIRone  (BUSPAR) 10 MG tablet   Yes No    Take 10 mg by mouth 2 (Two) Times a Day.    carvedilol (COREG) 6.25 MG tablet   No No    Take 1 tablet by mouth 2 (Two) Times a Day With Meals.    cefuroxime (CEFTIN) 250 MG tablet   Yes No    Take 250 mg by mouth 2 (Two) Times a Day.    ciprofloxacin-dexamethasone (CIPRODEX) 0.3-0.1 % otic suspension   Yes No    4 drops 2 (Two) Times a Day.    citalopram (CeleXA) 10 MG tablet   Yes No    Take 20 mg by mouth Daily.    clopidogrel (PLAVIX) 75 MG tablet   No No    Take 1 tablet by mouth Daily.    docusate calcium (SURFAK) 240 MG capsule   Yes No    Take 240 mg by mouth 2 (Two) Times a Day.    donepezil (ARICEPT) 5 MG tablet   Yes No    Take 5 mg by mouth Every Night.    ferrous sulfate 325 (65 FE) MG tablet  Child Yes No    Take 325 mg by mouth Daily With Breakfast.    Fexofenadine HCl (ALLEGRA PO)  Child Yes No    Take 180 mg by mouth 2 (Two) Times a Day.    fluticasone (FLONASE) 50 MCG/ACT nasal spray   Yes No    2 sprays into each nostril Daily.    furosemide (LASIX) 20 MG tablet   No No    Take 2 tablets by mouth Daily.    Patient taking differently:  Take 40 mg by mouth Every Other Day As Needed.    glucagon (GLUCAGEN) 1 MG injection   Yes No    Infuse  into a venous catheter 1 (One) Time.    guaiFENesin (ROBITUSSIN) 100 MG/5ML solution oral solution   No No    Take 10 mL by mouth Every 6 (Six) Hours As Needed (cough and congestion).    haloperidol (HALDOL) 5 MG tablet   Yes No    Take 5 mg by mouth 4 (Four) Times a Day.    hydrALAZINE (APRESOLINE) 10 MG tablet   Yes No    Take 10 mg by mouth 3 (Three) Times a Day.    hydrocortisone 0.5 % cream   Yes No    Apply  topically 2 (Two) Times a Day.    hydrOXYzine (ATARAX) 25 MG tablet   No No    Take 1 tablet by mouth Every 8 (Eight) Hours As Needed for Itching.    hydrOXYzine (VISTARIL) 25 MG capsule   Yes No    Take 25 mg by mouth 3 (Three) Times a Day As Needed for Itching.    insulin aspart (novoLOG) 100 UNIT/ML injection   No No     Inject 10 Units under the skin 3 (Three) Times a Day With Meals.    insulin detemir (LEVEMIR) 100 UNIT/ML injection   No No    Inject 25 Units under the skin Every Night.    ipratropium (ATROVENT) 0.02 % nebulizer solution   No No    Take 2.5 mL by nebulization 4 (Four) Times a Day As Needed for Wheezing or Shortness of Air.    ipratropium-albuterol (DUO-NEB) 0.5-2.5 mg/mL nebulizer   No No    Take 3 mL by nebulization Every 4 (Four) Hours As Needed for Shortness of Air.    isosorbide mononitrate (IMDUR) 30 MG 24 hr tablet   No No    Take 1 tablet by mouth Daily.    levothyroxine (SYNTHROID, LEVOTHROID) 25 MCG tablet   Yes No    Take 50 mcg by mouth Daily.    linagliptin (TRADJENTA) 5 MG tablet tablet  Child Yes No    Take 5 mg by mouth Daily.    loperamide (IMODIUM) 2 MG capsule   No No    Take 1 capsule by mouth 3 (Three) Times a Day As Needed for Diarrhea.    losartan (COZAAR) 100 MG tablet   Yes No    Take 100 mg by mouth Daily.    nystatin (MYCOSTATIN) 286228 UNIT/GM cream   Yes No    Apply  topically 2 (Two) Times a Day.    omeprazole (priLOSEC) 20 MG capsule  Child Yes No    Take 20 mg by mouth Daily.    potassium chloride (K-DUR) 10 MEQ CR tablet   Yes No    Take 10 mEq by mouth 2 (Two) Times a Day.    prednisoLONE acetate (PRED FORTE) 1 % ophthalmic suspension   Yes No    1 drop 4 (Four) Times a Day.    rivaroxaban (XARELTO) 15 MG tablet   No No    Take 1 tablet by mouth Daily With Dinner.    rivastigmine (EXELON) 9.5 MG/24HR patch  Child Yes No    Place 1 patch on the skin Daily.    rivastigmine (EXELON) 9.5 MG/24HR patch   Yes No    Place 1 patch on the skin Daily.    valsartan (DIOVAN) 80 MG tablet   No No    Take 1 tablet by mouth Daily.    vitamin D (ERGOCALCIFEROL) 65780 units capsule capsule  Child Yes No    Take 50,000 Units by mouth 1 (One) Time Per Week.        ---------------------------------------------------------------------------------------------------------------------  Objective    Hospital Scheduled Meds:    ceftriaxone 1 g Intravenous Q24H   doxycycline 100 mg Intravenous Q12H   insulin aspart 0-7 Units Subcutaneous 4x Daily AC & at Bedtime   metroNIDAZOLE 500 mg Intravenous Q8H   sodium chloride 3 mL Intravenous Q12H       sodium chloride 75 mL/hr     ---------------------------------------------------------------------------------------------------------------------   Vital Signs:  Temp:  [98.4 °F (36.9 °C)] 98.4 °F (36.9 °C)  Heart Rate:  [88] 88  Resp:  [18] 18  BP: (96)/(63) 96/63  No data found.  SpO2 Percentage    02/06/19 1800   SpO2: 94%     SpO2:  [94 %] 94 %  on   ;        Body mass index is 26.59 kg/m².  Wt Readings from Last 3 Encounters:   02/06/19 53.8 kg (118 lb 9.6 oz)   07/17/18 65 kg (143 lb 6.4 oz)   05/27/18 71.7 kg (158 lb)     ---------------------------------------------------------------------------------------------------------------------   Physical Exam:  Constitutional:  Elderly female, chronically ill in appearance. Awake at this time, non-toxic in appearance, very hard of hearing  HENT:  Head: Normocephalic and atraumatic.  Mouth: Somewhat dry.   Eyes:  Conjunctivae and EOM are normal. No scleral icterus. No erythema or drainage. PERRL.  Neck:  Neck supple.  No JVD present. No carotid bruits noted.   Cardiovascular:  Normal rate, regular rhythm and normal heart sounds with no murmur.  Pulmonary/Chest: Poor inspiratory effort. Increased effort elicits hacking cough. No respiratory distress, no wheezes. Rales noted in the right middle and lower lung fields.   Abdominal:  Soft.  Bowel sounds are present x4.  No distension and no tenderness.   Musculoskeletal:  No edema, no tenderness, and no deformity.  No red or swollen joints anywhere.    Neurological: Awake. Oriented to self but not age, time or place. Can follow simple commands. No cranial nerve deficit.  No tongue deviation.  No facial droop.  No slurred speech. Negative babinski bilaterally.   Skin:  Skin  is warm and dry.  No rash noted.  No pallor.   Psychiatric: Lethargic.   Peripheral vascular:  No edema and 2+ pedal pulses bilaterally.   Genitourinary: No bernard catheter in place.   ---------------------------------------------------------------------------------------------------------------------  EKG: NSR, HR 87, QTc 500 but 450 when corrected for RBBB. Left axis deviation. No overt ST changes to suggest acute ishemia appreciated.    Telemetry:  Not yet on telemetry monitoring.     I have personally reviewed the EKG/Telemetry strip.  ---------------------------------------------------------------------------------------------------------------------           Results from last 7 days   Lab Units 02/06/19  1907   PH, ARTERIAL pH units 7.461*   PO2 ART mm Hg 103.0*   PCO2, ARTERIAL mm Hg 34.3*   HCO3 ART mmol/L 23.9     Results from last 7 days   Lab Units 02/06/19  1933   WBC 10*3/mm3 14.56*   HEMOGLOBIN g/dL 12.6   HEMATOCRIT % 39.9   MCV fL 89.5   MCHC g/dL 31.6*   PLATELETS 10*3/mm3 241         Invalid input(s): PROTCrCl cannot be calculated (Patient's most recent lab result is older than the maximum 30 days allowed.).  No results found for: AMMONIA    No results found for: HGBA1C, POCGLU  Lab Results   Component Value Date    HGBA1C 8.90 (H) 10/26/2017     Lab Results   Component Value Date    TSH 2.215 10/28/2017     Pain Management Panel     There is no flowsheet data to display.        I have personally reviewed the above laboratory results.   ---------------------------------------------------------------------------------------------------------------------  Imaging Results (last 7 days)     Procedure Component Value Units Date/Time    XR Chest 1 View [[id number]] Updated:  02/06/19 1933      Hazy right sided cardiac sillhouette border suggestive of possible right lower lobe infiltrate.    I have personally reviewed the above radiology results.    ---------------------------------------------------------------------------------------------------------------------  Assessment & Plan        -Severe sepsis, present on admission, as noted by WBC count >14,000 and reported fever of 101.9F (further labs pending) and encephalopathy, secondary to acute community acquired versus aspiration right lower lobe pneumonia: Check blood cultures x2. Supplemental O2 via nasal cannula to maintain oxygen saturations >90%. Rule out atypical organisms by checking urinary legionella and mycoplasma studies. Check respiratory cultures. Place on IV rocephin, doxycycline, and flagyl to cover for community acquired and aspiration pneumonia. Will add nebulizer treatments once lactic acid has returned. Will keep NPO for now except for sips with meds. Consult SLP for swallow evaluation based on family's reports of history of dysphagia with aspiration in the past (daughter states she her swallowing has not been evaluated in the last 2 years). Place on gentle IVF hydration with normal saline at 75cc/hr. Repeat CBC and CRP in AM.     -Encephalopathy superimposed on baseline dementia, likely secondary to above sepsis. STAT CT head and chest pending. Basic labs also still pending. Continue treatment for pneumonia as noted above.    -Chronic UTIs: UA with culture if indicated pending. She is reported to have been on chronic Macrobid therapy since 09/2018 for recurrent UTIs. She also just recently finished a 10 day course of Augmentin last Friday. She took Diflucan last evening for reported yeast in an outpatient urinalysis and was due for another dose tomorrow. Await reconciliation of home meds by pharmacy.    -IDDM: Will place on low dose sliding scale insulin with finger stick blood glucose readings AC and HS. Place on hypoglycemia protocol. Check hemoglobin A1c level. Review home insulin regimen once meds reconciled by pharmacy.    -Paroxysmal atrial fibrillation: Chronically anticoagulated  with Xarelto for stroke prevention. STAT EKG above shows patient currently in sinus rhythm. Monitor on telemetry. Monitor electrolytes. TSH and magnesium level pending.     -Chronic systolic congestive heart failure: Last known EF at 40% in 10/2017. She does not appear to be fluid overloaded at this point. Check BNP. Chest XR not felt to show pulmonary edema.      -History of essential hypertension: now borderline hypotensive. Will give light IVF and hold antihypertensive medications for now. Add IV hydralazine 10mg TID PRN for SBP >160.     -Baseline dementia: Place on fall and aspiration precautions. Bedside sitter ordered.    -History of pulmonary nodules: Family did not wish to pursue further invasive evaluation. Will obtain CT chest to evaluate further and ensure pneumonia is not post-obstructive in etiology.     -Thoracic back pain: CT chest pending, will see if there are any obvious bony abnormalities on that scan.    -DVT Prophylaxis: Xarelto at home.    * The patient is considered to be a high risk patient due to: altered mental status, severe sepsis, pneumonia, advanced age with dementia, CHF, EF 40%.     Code Status: No CPR. Daughter is agreeable to short term intubation. Otherwise full interventions.     I have discussed the patient's assessment and plan with the patient's daughter, son-in-law, and admitting physician, Dr. Costa.     Sebastian Costa MD  02/06/19  8:11 PM  ---------------------------------------------------------------------------------------------------------------------   * I have seen the patient in conjunction with KACI Chase, and have amended her note to reflect my own findings, assessment and plan. ANNIKA Singh accompanied me and was present during the entire interview and physical examination on [facility].    Electronically signed by Sebastian Costa MD at 2/6/2019  8:22 PM       Physician Progress Notes (last 72 hours) (Notes from 2/4/2019 12:39 PM through  2/7/2019 12:39 PM)     No notes of this type exist for this encounter.        Consult Notes (last 72 hours) (Notes from 2/4/2019 12:39 PM through 2/7/2019 12:39 PM)     No notes of this type exist for this encounter.        All medication doses during the admission are shown, including meds that are no longer on order.   Scheduled Meds Sorted by Name   for Floridalma Bryant as of 02/07/19 1240     1 Day 3 Days 7 Days 10 Days < Today >    Legend:                           Inactive     Active     Other Encounter    Linked               Medications 01/29 01/30 01/31 02/01 02/02 02/03 02/04 02/05 02/06 02/07   cefTRIAXone (ROCEPHIN) 1 g/100 mL 0.9% NS (MBP)   Dose: 1 g  Freq: Every 24 Hours Route: IV  Indications of Use: PNEUMONIA  Indications Comment: CAP  Start: 02/06/19 2100 End: 02/13/19 2059    Admin Instructions:   Caution: Look alike/sound alike drug alert. Activate vial before using.            2208 2100        citalopram (CeleXA) tablet 20 mg   Dose: 20 mg  Freq: Daily Route: PO  Start: 02/07/19 0900    Admin Instructions:   Caution: Look alike/sound alike drug alert.             0900        clopidogrel (PLAVIX) tablet 75 mg   Dose: 75 mg  Freq: Daily Route: PO  Start: 02/07/19 0900             0900        doxycycline (VIBRAMYCIN) 100 mg/100 mL 0.9% NS MBP   Dose: 100 mg  Freq: Every 12 Hours Route: IV  Indications of Use: PNEUMONIA  Indications Comment: CAP  Start: 02/06/19 2300 End: 02/13/19 2259    Admin Instructions:   Protect from light            3605      1210     2300        ferrous sulfate tablet 325 mg   Dose: 325 mg  Freq: Daily With Breakfast Route: PO  Start: 02/07/19 0800    Admin Instructions:   Swallow whole. Do not crush, split, or chew. Take with food if GI upset occurs.             0800        I-chelo tablet 1 tablet   Dose: 1 tablet  Freq: Daily Route: PO  Start: 02/07/19 0900             0900        insulin aspart (novoLOG) injection 0-7 Units   Dose: 0-7 Units  Freq: 4 Times Daily  Before Meals & Nightly Route: SC  Start: 02/06/19 2100    Admin Instructions:   Correction - Low Dose.  Less than 40 units/day total insulin dose or lean, elderly, renal patients    Blood glucose 150-199 mg/dL - 2 units  Blood glucose 200-249 mg/dL - 3 units  Blood glucose 250-299 mg/dL - 4 units  Blood glucose 300-349 mg/dL - 5 units  Blood glucose 350-400 mg/dL - 6 units  Blood glucose greater than 400 mg/dL - 7 units and call provider              2202) [C]      (5326) [phone]        levothyroxine (SYNTHROID, LEVOTHROID) tablet 50 mcg   Dose: 50 mcg  Freq: Daily Route: PO  Start: 02/07/19 0900    Admin Instructions:   Take on empty stomach.             0900        metroNIDAZOLE (FLAGYL) IVPB 500 mg   Dose: 500 mg  Freq: Every 8 Hours Route: IV  Indications of Use: PNEUMONIA  Indications Comment: CAP with possible aspiration  Start: 02/06/19 2200 End: 02/13/19 2159    Admin Instructions:   Do not refrigerate.            2208      0457     0600     1400     2200        pantoprazole (PROTONIX) EC tablet 40 mg   Dose: 40 mg  Freq: Every Morning Route: PO  Start: 02/07/19 0700    Admin Instructions:   Swallow whole; do not crush, split, or chew.             0700        rivaroxaban (XARELTO) tablet 15 mg   Dose: 15 mg  Freq: Daily With Dinner Route: PO  Indications of Use: Other - full anticoagulation  Start: 02/07/19 1800    Admin Instructions:   If giving by tube: suspend in 50mL water, administer, and immediately follow with enteral feeding.  Give with food.             1800        rivastigmine (EXELON) 9.5 MG/24HR patch 1 patch   Dose: 1 patch  Freq: Daily Route: TD  Start: 02/07/19 0900    Admin Instructions:   Apply patch to clean, dry, intact hairless skin on upper or lower back, upper arm or chest. Do not apply to same site more than once every 14 days.             1205 [C]        sodium chloride 0.9 % flush 3 mL   Dose: 3 mL  Freq: Every 12 Hours Scheduled Route: IV  Start: 02/06/19 2100             2202      (0911) [C]     2100        Medications 01/29 01/30 01/31 02/01 02/02 02/03 02/04 02/05 02/06 02/07       Continuous Meds Sorted by Name   for Floridalma Bryant as of 02/07/19 1240    Legend:                           Inactive     Active     Other Encounter    Linked               Medications 01/29 01/30 01/31 02/01 02/02 02/03 02/04 02/05 02/06 02/07   Pharmacy Consult - Pharmacy to dose   Freq: Continuous PRN Route: XX  PRN Reason: Consult  Start: 02/06/19 1951 End: 02/06/19 2003    Order specific questions:   Pharmacy to Dose: Flagyl  Indication of Use CAP with possible aspiration.            2003-D/C'd       Pharmacy Consult - Pharmacy to dose   Freq: Continuous PRN Route: XX  PRN Reason: Consult  Start: 02/06/19 1949 End: 02/06/19 2003    Order specific questions:   Pharmacy to Dose: Doxycycline  Indication of Use CAP.            2003-D/C'd       Pharmacy Consult - Pharmacy to dose   Freq: Continuous PRN Route: XX  PRN Reason: Consult  Start: 02/06/19 1949 End: 02/06/19 2003    Order specific questions:   Pharmacy to Dose: Rocephin  Indication of Use CAP.            2003-D/C'd       sodium chloride 0.9 % infusion   Rate: 75 mL/hr Dose: 75 mL/hr  Freq: Continuous Route: IV  Last Dose: 75 mL/hr (02/06/19 2208)  Start: 02/06/19 2045            2208     2329             PRN Meds Sorted by Name   for Floridalma Bryant as of 02/07/19 1240    Legend:                           Inactive     Active     Other Encounter    Linked               Medications 01/29 01/30 01/31 02/01 02/02 02/03 02/04 02/05 02/06 02/07   dextrose (D50W) 25 g/ 50mL Intravenous Solution 25 g   Dose: 25 g  Freq: Every 15 Minutes PRN Route: IV  PRN Reason: Low Blood Sugar  PRN Comment: Blood Sugar Less Than 70  Start: 02/06/19 2000    Admin Instructions:   Blood sugar less than 70; patient has IV access - Unresponsive, NPO or Unable To Safely Swallow                dextrose (GLUTOSE) oral gel 15 g   Dose: 15 g  Freq: Every 15  Minutes PRN Route: PO  PRN Reason: Low Blood Sugar  PRN Comment: Blood sugar less than 70  Start: 02/06/19 2000    Admin Instructions:   BS<70, Patient Alert, Is not NPO, Can safely swallow.                docusate sodium (COLACE) capsule 200 mg   Dose: 200 mg  Freq: Daily PRN Route: PO  PRN Reason: Constipation  Start: 02/06/19 2100    Admin Instructions:   Swallow whole. Do not open, crush, or chew capsule.                glucagon (human recombinant) (GLUCAGEN DIAGNOSTIC) injection 1 mg   Dose: 1 mg  Freq: As Needed Route: SC  PRN Comment: Blood Glucose Less Than 70  Start: 02/06/19 2000    Admin Instructions:   Blood Glucose Less Than 70 - Patient Without IV Access - Unresponsive, NPO or Unable To Safely Swallow                hydrALAZINE (APRESOLINE) injection 10 mg   Dose: 10 mg  Freq: Every 6 Hours PRN Route: IV  PRN Reason: High Blood Pressure  Start: 02/06/19 1955    Admin Instructions:   As needed for SBP greater than 170 or DBP>100  Caution: Look alike/sound alike drug alert                Magnesium Sulfate 2 gram Bolus, followed by 8 gram infusion (total Mg dose 10 grams)- Mg less than or equal to 1mg/dL   Dose: 2 g  Freq: As Needed Route: IV  PRN Comment: See Administration Instructions  Start: 02/07/19 1238    Admin Instructions:   Mg less than or equal to 1mg/dL. Give 2 gm over 30 minutes as bolus, then infuse 2 gm over 2 hours for 4 doses (8 grams) for total dose of 10 grams.  Recheck Mg levels in the AM.                Or  Magnesium Sulfate 2 gram / 50mL Infusion (GIVE X 3 BAGS TO EQUAL 6GM TOTAL DOSE) - Mg 1.1 - 1.5 mg/dl   Dose: 2 g  Freq: As Needed Route: IV  PRN Comment: See Administration Instructions  Start: 02/07/19 1238    Admin Instructions:   Mg 1.1 -1.5 mg/dL. Infuse 2 grams over 2 hours for 3 doses (for a total Mg dose of 6 grams).  Recheck Mg level in the AM.                Or  Magnesium Sulfate 4 gram infusion- Mg 1.6-1.9 mg/dL   Dose: 4 g  Freq: As Needed Route: IV  PRN Comment: See  Administration Instructions  Start: 02/07/19 1238    Admin Instructions:   Mg 1.6-1.9 mg/dL. Recheck Mg level in the AM.                nitroglycerin (NITROSTAT) SL tablet 0.4 mg   Dose: 0.4 mg  Freq: Every 5 Minutes PRN Route: SL  PRN Reason: Chest Pain  PRN Comment: if systolic BP greater than 100 mm/Hg.  Start: 02/06/19 1843    Admin Instructions:   If pain unrelieved after 3 doses, notify MD.                Pharmacy Consult - Pharmacy to dose   Freq: Continuous PRN Route: XX  PRN Reason: Consult  Start: 02/06/19 1951 End: 02/06/19 2003    Order specific questions:   Pharmacy to Dose: Flagyl  Indication of Use CAP with possible aspiration.            2003-D/C'd       Pharmacy Consult - Pharmacy to dose   Freq: Continuous PRN Route: XX  PRN Reason: Consult  Start: 02/06/19 1949 End: 02/06/19 2003    Order specific questions:   Pharmacy to Dose: Doxycycline  Indication of Use CAP.            2003-D/C'd       Pharmacy Consult - Pharmacy to dose   Freq: Continuous PRN Route: XX  PRN Reason: Consult  Start: 02/06/19 1949 End: 02/06/19 2003    Order specific questions:   Pharmacy to Dose: Rocephin  Indication of Use CAP.            2003-D/C'd       potassium chloride (K-DUR,KLOR-CON) ER tablet 40 mEq   Dose: 40 mEq  Freq: As Needed Route: PO  PRN Comment: potassium replacement.  see admin instructions  Start: 02/07/19 1300    Admin Instructions:   Potassium replacement    Oral (may give capsule or powder packet)  If K+ less than or equal to 3.1 give KCl 40 mEq q4h x 3 doses  If K+ 3.2-3.6 give KCl 40 mEq q4h x 2 doses    Peripheral IV  If K+ less than or equal to 3.1 give KCl 10 mEq/100 mL NS IV q1h x 6 doses  If K+ 3.2-3.6 give KCl 10 mEq/100 mL NS q1h x 4 doses    Central Line  If K+ less than or equal to 3.1 give KCl 20 mEq/50 mL NS IV q1h x 3 doses  If K+ 3.2-3.6 give KCl 20 mEq/50 mL NS q1h x 2 doses    Check potassium 4 hours after last dose given.  Check magnesium if K stays low after replacement.  DO NOT GIVE  if CrCl is less than 30 mL/minute or urine output is less than 30 mL/hr  Swallow whole; do not crush, split, or chew.                Or  potassium chloride (KLOR-CON) packet 40 mEq   Dose: 40 mEq  Freq: As Needed Route: PO  PRN Comment: potassium replacement, see admin instructions  Start: 02/07/19 1300    Admin Instructions:   Potassium replacement    Oral (may give capsule or powder packet)  If K+ less than or equal to 3.1 give KCl 40 mEq q4h x 3 doses  If K+ 3.2-3.6 give KCl 40 mEq q4h x 2 doses    Peripheral IV  If K+ less than or equal to 3.1 give KCl 10 mEq/100 mL NS IV q1h x 6 doses  If K+ 3.2-3.6 give KCl 10 mEq/100 mL NS q1h x 4 doses    Central Line  If K+ less than or equal to 3.1 give KCl 20 mEq/50 mL NS IV q1h x 3 doses  If K+ 3.2-3.6 give KCl 20 mEq/50 mL NS q1h x 2 doses    Check potassium 4 hours after last dose given.  Check magnesium if K stays low after replacement.  DO NOT GIVE if CrCl is less than 30 mL/minute or urine output is less than 30 mL/hr                Or  potassium chloride 10 mEq in 100 mL IVPB   Dose: 10 mEq  Freq: Every 1 Hour PRN Route: IV  PRN Comment: potassium protocol PERIPHERAL - see admin instructions  Start: 02/07/19 1300    Admin Instructions:   Potassium replacement - patient NPO or cannot tolerate oral potassium    Peripheral or Central IV  If K+ less than or equal to 3.1 give KCl 10 mEq/100 mL NS IV q1h x 6 doses  If K+ 3.2-3.6 give KCl 10 mEq/100 mL NS q1h x 4 doses    Check potassium 4 hours after last dose given.  Check magnesium if K stays low after replacement.  DO NOT GIVE if CrCl is less than 30 mL/minute or urine output is less than 30 mL/hr. Rates greater than 10mEq/hr require ECG monitoring.                sodium chloride 0.9 % flush 1-10 mL   Dose: 1-10 mL  Freq: As Needed Route: IV  PRN Reason: Line Care  Start: 02/06/19 1843                Medications 01/29 01/30 01/31 02/01 02/02 02/03 02/04 02/05 02/06 02/07

## 2019-02-07 NOTE — MBS/VFSS/FEES
Acute Care - Speech Language Pathology   Swallow Initial Evaluation  [facility]   MODIFIED BARIUM SWALLOW STUDY     Patient Name: Floridalma Bryant  : 1934  MRN: [id number]  Today's Date: 2019             Admit Date: 2019    Visit Dx:   No diagnosis found.  Patient Active Problem List   Diagnosis   • NSTEMI (non-ST elevated myocardial infarction) (CMS/HCC)   • Essential hypertension   • Type II diabetes mellitus (CMS/HCC)   • Dementia   • Pulmonary nodules   • RBBB   • Chronic right basal ganglia lacunar infarct on previous head CT   • Advanced age   • Hiatal hernia   • GERD (gastroesophageal reflux disease)   • Shortness of breath   • Hypoxia   • Sepsis (CMS/HCC)     Past Medical History:   Diagnosis Date   • Afib (CMS/HCC)    • Dementia    • Dementia    • Diabetes mellitus (CMS/HCC)    • Hypertension    • Pancreatitis    • Systolic CHF (CMS/HCC)      Past Surgical History:   Procedure Laterality Date   • ANKLE SURGERY     • HYSTERECTOMY     • SHOULDER SURGERY     • TYMPANOPLASTY       F/u this pm. Ms. Bryant is seen at bedside pm on 3N. Her daughter is present at bedside. Ms. Bryant is resting upon SLP entry, easily awakens to mod verbal/tactile stimuli. D/w pt and pt's daughter risks and benefits of instrumental MBS w/ verbal understanding and agreement.     Floridalma Bryant  presents to the radiology suite this pm from 3N 3347/1S to participate in an instrumental MBS to evaluate safety/efficacy of swallowing fnx, determine safest/least restrictive diet. She is familiar to Speech Therapy Department for h/o MBS 17 w/ deep laryngeal penetration of thin liquids.      Social History     Socioeconomic History   • Marital status:      Spouse name: Not on file   • Number of children: Not on file   • Years of education: Not on file   • Highest education level: Not on file   Social Needs   • Financial resource strain: Not on file   • Food insecurity - worry: Not on file   • Food insecurity - inability:  Not on file   • Transportation needs - medical: Not on file   • Transportation needs - non-medical: Not on file   Occupational History   • Not on file   Tobacco Use   • Smoking status: Never Smoker   • Smokeless tobacco: Never Used   Substance and Sexual Activity   • Alcohol use: No   • Drug use: No   • Sexual activity: Defer   Other Topics Concern   • Not on file   Social History Narrative   • Not on file      Chest CT w/o contrast 2/6/19 reveals stable parenchymal nodules bilaterally, pericardial effusion, L basilar consolidation per radiologist.     Diet Orders (active) (From admission, onward)    Start     Ordered    02/06/19 1955  NPO Diet NPO Except: Sips With Meds  Diet Effective Now      02/06/19 1954        Pt is observed on 2L O2 via NC w/o complications.     Risks and benefits of the procedure are explained w/ pt verbalizing understanding/agreement to participate.     Pt is positioned upright and centered in a soft strap supportive chair to accept multiple po presentations of solid cracker, puree, honey thick, nectar thick, and thin liquids via spoon, cup and straw, along w/ whole placebo pill in puree. She does not self feed.     All views are from the lateral plane.     Facial/oral structures are symmetrical upon observation w/o lingual deviation upon protrusion. Oral mucosa are moist, pink and clean. Secretions are clear, thin and well controlled. OROM/KALEY is mildly weak and delayed to imitate oral postures. Gag is not assessed. Volitional cough is adequate in intensity, congestive in quality, nonproductive. Vocal quality is adequate in intensity, clear in quality w/ intelligible speech. She evidences w/ minimal verbalizations, however, she does respond to simple y/n and oe questions/statements w/ 1-3 word responses w/ SLP initiation. She is a/a and cooperative to particpate. She is oriented to person, follows simple directives intermittently, pleasantly confused. Noted premorbid baseline dementia.       Upon po presentations, adequate bolus anticipation w/ good labial seal for bolus clearance via spoon bowl, cup rim stability and suction via straw. Bolus formation, manipulation, and control are generally weak w/ moderately increased oral prep time w/ solids, mixed phasic/rotary mastication pattern. A-p transit is slightly delayed w/o significant oral residue. Tongue base retraction and linguavelar seal are generally weak w/ premature spillage of honey thick, nectar thick and thin liquids before the swallow, controlled in the bilateral pyriform sinuses. No laryngeal penetration or aspiration is evidenced before the swallow.     Pharyngeal swallow is timely w/ slightly weak hyolaryngeal elevation and epiglottic inversion. Pharyngeal contraction is slightly weak w/ trace, diffuse pharyngeal residue w/ all consistencies. Deep laryngeal penetration occurs during the swallow w/ thin liquid via all presentation styles w/ intermittent silent aspiration during the swallow via cup and straws. Cued cough effective to clear. No other laryngeal penetration or aspiration evidenced during or after the swallow.     Pt masticates whole placebo pill in puree. Transits and swallows w/o difficulty.     Full esophageal sweep reveals no obvious mucosal abnormalities. Motility appears adequate w/o retrograde flow noted.      Impression: Per this evaluation, Ms. Bryant presents w/ a mild oropharyngeal dysphagia w/ intermittent silent aspiration of thin liquids. She is felt to most benefit from a modified po diet w/ nectar thick liquids. She will require 1:1 feeding assistance.     EDUCATION  The patient has been educated in the following areas:   Dysphagia (Swallowing Impairment).    SLP Recommendation and Plan  1. Mechanical soft consistency, ground meats, NECTAR thick liquids.   2. Meds whole in puree.  3. 1:1 feeds.    4. Universal aspiration precautions.   5. FELY precautions.   6. Oral care protocol.   7. Upright and centered for  all po intake.   8. Fully a/a for all po intake.   SLP to f/u for diet tolerance/trial dysphagia tx pending pt status to functionally participate.     D/w RN, Luma, results and recommendations via telephone w/ verbal understanding and agreement.     Thank you for allowing me to participate in the care of your patient-  Janis Bhat M.A., CCC-SLP    Plan of Care Reviewed With: patient  Plan of Care Review  Plan of Care Reviewed With: patient  Progress: improving     Time Calculation:   Time Calculation- SLP     Row Name 02/07/19 1546 02/07/19 0852          Time Calculation- SLP    SLP - Next Appointment  02/08/19  -CM  02/07/19  -CM       User Key  (r) = Recorded By, (t) = Taken By, (c) = Cosigned By    Initials Name Provider Type    Janis Miller MA,CCC-SLP Speech Therapist          Therapy Charges for Today     Code Description Service Date Service Provider Modifiers Qty    07401278010 HC ST MOTION FLUORO EVAL SWALLOW 8 2/7/2019 Janis Bhat MA,CCC-SLP GN 1               Janis Bhat MA,CCC-SLP  2/7/2019

## 2019-02-07 NOTE — PROGRESS NOTES
Subjective     History:   Floridalma Bryant is a 84 y.o. female admitted on 2/6/2019 secondary to <principal problem not specified>     Procedures: None    CC: Follow up pneumonia    Patient seen and examined with ANNIKA Ge. Sleeping upon my arrival but easily awakens. Family present at bedside. Pt is oriented to person only and unable to provide any history. She does report some cough and dyspnea. Family and RN state patient has slept much of the day but her daughter states she had not been sleeping much at home. No acute events overnight per RN.     History taken from: patient's family, chart, and RN.      Objective     Vital Signs  Temp:  [98.1 °F (36.7 °C)-98.7 °F (37.1 °C)] 98.7 °F (37.1 °C)  Heart Rate:  [70-88] 70  Resp:  [18] 18  BP: ()/(48-63) 113/48    Intake/Output Summary (Last 24 hours) at 2/7/2019 1547  Last data filed at 2/7/2019 0300  Gross per 24 hour   Intake 0 ml   Output 70 ml   Net -70 ml         Physical Exam:  General:    Awake, alert but confused, in no acute distress, chronically ill appearing, hard of hearing    Heart:      Normal S1 and S2. Regular rate and rhythm. No significant murmur, rubs or gallops appreciated.   Lungs:     Respirations regular, even and unlabored. Diminished breath sounds throughout with bibasilar crackles. No wheezes.    Abdomen:   Soft and nontender. No guarding, rebound tenderness or  organomegaly noted. Bowel sounds present x 4.   Extremities:  No clubbing, cyanosis or edema noted. Moves UE and LE equally B/L.     Results Review:    Results from last 7 days   Lab Units 02/07/19  0411 02/06/19  1933   WBC 10*3/mm3 9.64 14.56*   HEMOGLOBIN g/dL 11.8* 12.6   PLATELETS 10*3/mm3 227 241     Results from last 7 days   Lab Units 02/07/19  0411 02/06/19  1933   SODIUM mmol/L 140 140   POTASSIUM mmol/L 3.5 3.6   CHLORIDE mmol/L 108 104   CO2 mmol/L 25.2 26.4   BUN mg/dL 13 14   CREATININE mg/dL 0.58 0.73   CALCIUM mg/dL 8.4 8.8   GLUCOSE mg/dL 125* 140*     Results  from last 7 days   Lab Units 02/07/19  0411 02/06/19  1933   BILIRUBIN mg/dL 0.5 0.7   ALK PHOS U/L 52 62   AST (SGOT) U/L 9* 9*   ALT (SGPT) U/L 5* 8*     Results from last 7 days   Lab Units 02/06/19  1933   MAGNESIUM mg/dL 1.5*         Results from last 7 days   Lab Units 02/06/19 1933   TROPONIN I ng/mL 0.008       Imaging Results (last 24 hours)     Procedure Component Value Units Date/Time    FL Video Swallow [[id number]] Collected:  02/07/19 1426     Updated:  02/07/19 1444    Narrative:       FL VIDEO SWALLOW-     CLINICAL INDICATION: aspiration          TECHNIQUE:   Speech therapy service was present. The patient was examined in the  sitting lateral position and was given several consistencies of barium.     FINDINGS: Silent aspiration with thin liquids     FLUOROSCOPY TIME: 1.5 minutes     Images: Cine loop was acquired       Impression:       Aspiration with thin liquids     For additional information please see the report provided by the speech  therapy service     This report was finalized on 2/7/2019 2:41 PM by Dr. Daryl Rosas MD.       XR Chest 1 View [[id number]] Collected:  02/07/19 0719     Updated:  02/07/19 1055    Narrative:       XR CHEST 1 VIEW-     CLINICAL INDICATION: Pneumonia.          COMPARISON: 05/27/2018      TECHNIQUE: Single frontal view of the chest.     FINDINGS:     Diffuse interstitial changes.  The cardiac silhouette is normal. The pulmonary vasculature is  unremarkable.  There is no evidence of an acute osseous abnormality.   There are no suspicious-appearing parenchymal soft tissue nodules.            Impression:       No evidence of active or acute cardiopulmonary disease on today's chest  radiograph.         This report was finalized on 2/7/2019 10:53 AM by Dr. Daryl Rosas MD.       CT Head Without Contrast [[id number]] Collected:  02/07/19 0719     Updated:  02/07/19 1007    Narrative:       CT HEAD WITHOUT CONTRAST-     CLINICAL INDICATION: Decreased alertness           COMPARISON: 04/16/2018      TECHNIQUE: Axial images of the brain were obtained without intravenous  contrast.  Reformatted images were created in the sagittal and coronal  planes.     DOSE: 1095.9 mGy.cm     Radiation dose reduction techniques were utilized per ALARA protocol.  Automated exposure control was initiated through either or [employer] or  [employer] software packages by  protocol.           FINDINGS:   Today's study shows no mass, hemorrhage, or midline shift.   The ventricles, cisterns, and sulci are unremarkable. There is no  hydrocephalus.   There is no evidence of acute ischemia.  I do not see epidural or subdural hematoma.  The gray-white differentiation is appropriate.   The bone window setting images show no destructive calvarial lesion or  acute calvarial fracture.   The posterior fossa is unremarkable.  Opacification of the left mastoid air cells.        Impression:       1. Opacification of the left mastoid air cells.  2. No parenchymal mass, hemorrhage, or midline shift.     This report was finalized on 2/7/2019 10:05 AM by Dr. Daryl Rosas MD.       CT Chest Without Contrast [[id number]] Collected:  02/07/19 0718     Updated:  02/07/19 1003    Narrative:       EXAMINATION: CT CHEST WITHOUT CONTRAST-      CLINICAL INDICATION: Shortness of breath          DOSE: 497.918826 mGy.cm  COMPARISON: 01/05/2018     Radiation dose reduction techniques were utilized per ALARA protocol.  Automated exposure control was initiated through either or [employer] or  [employer] software packages by  protocol.           PROCEDURE: Axial images were acquired from the thoracic inlet through  the upper abdomen without any IV contrast. Reformatted images were  created.     FINDINGS: Today's study shows small pericardial effusion.     Consolidation in the left lower lobe probably representing pneumonia.  There is an 8 mm nodule in the right lung on image 34 of the axial  series. This is stable.  Other parenchymal nodules present bilaterally  also very similar to the previous exam.     There are coronary artery calcifications.       Impression:       1. Parenchymal nodules bilaterally are stable.  2. Pericardial effusion is new.  3. Left basilar consolidation.  4. Upper abdomen demonstrating cholelithiasis.      This report was finalized on 2/7/2019 10:01 AM by Dr. Daryl Rosas MD.               Medications:    ceftriaxone 1 g Intravenous Q24H   citalopram 20 mg Oral Daily   clopidogrel 75 mg Oral Daily   doxycycline 100 mg Intravenous Q12H   ferrous sulfate 325 mg Oral Daily With Breakfast   I-chelo 1 tablet Oral Daily   insulin aspart 0-7 Units Subcutaneous 4x Daily AC & at Bedtime   lactobacillus acidophilus 1 capsule Oral Daily   levothyroxine 50 mcg Oral Daily   magnesium sulfate 2 g Intravenous Once   metroNIDAZOLE 500 mg Intravenous Q8H   pantoprazole 40 mg Oral QAM   potassium chloride 40 mEq Oral Q4H   rivaroxaban 15 mg Oral Daily With Dinner   rivastigmine 1 patch Transdermal Daily   sodium chloride 3 mL Intravenous Q12H       sodium chloride 75 mL/hr Last Rate: 75 mL/hr (02/06/19 2867)           Assessment/Plan   Severe sepsis with metabolic encephalopathy: Likely 2/2 bibasilar pneumonia. Febrile at outside clinic but afebrile since upon admission. Leukocytosis resolved today. CRP elevated. Blood cultures with NGTD. Cont Rocephin, Doxycycline and Flagyl. Cont to follow cultures and repeat labs in the AM.    Bibasilar pneumonia: Community acquired with probable aspiration component as swallow eval reveals intermittent silent aspiration with thin liquids. Atypical workup is negative. Cont antibiotics as above.     Metabolic encephalopathy superimposed on baseline dementia: CT head negative for any acute intracranial abnormalities. Likely 2/2 above. Cont treatment of pneumonia. Cont supportive treatment with sitter present at bedside.     Recurrent UTI's: UA this admission not suggestive of  UTI.    Borderline hypokalemia: Start electrolyte replacement protocols.     Pericardial effusion: CT chest reveals pericardial effusion. Echo ordered and pending.     PAF: Currently in sinus rhythm. Cont Xarelto for stroke prevention.     Chronic systolic CHF: Appears compensated at present. Repeat echo pending. Monitor I/O's.     DM II, insulin dependent: HgbA1c 6.6. BG levels currently stable. Cont SSI with Accuchecks.     Essential HTN: BP low upon admission but improved today. Holding home antihypertensives. Will restart if BP begins trending upward.     DVT PPX: Xarelto    Pt is at high risk 2/2 severe sepsis, pneumonia, metabolic encephalopathy superimposed on baseline dementia, PAF, CHF and advanced age.       Jaycob Coy,   02/07/19  3:47 PM

## 2019-02-07 NOTE — SIGNIFICANT NOTE
Consult received. Chart reviewed. Ms. Bryant is noted w/ admit to [facility] 2/2 PNA. She is currently NPO, referred to evaluate swallowing fnx, determine safest/least restrictive po diet. She evidences w/ ams and multiple medical comorbidities, per this status felt to most benefit from instrumental MBS to objectively determine safest/least restrictive po diet.     D/w RNLuma, pt status via telephone w/ deferral of formal dysphagia evaluation this am 2/2 pt unable to arouse to functionally participate at this time. She indicates current status is consistent since admit to this facility.     Plan: SLP to f/u this pm for pt status w/ MBS pending improvement in a/a status to functionally participate. Please continue NPO status at this time, universal aspiration precautions, FELY precautions, oral care protocol.     RN verbalizes understanding and agreement.     Thank you-  Janis Bhat M.A., CCC-SLP

## 2019-02-07 NOTE — PLAN OF CARE
Problem: Patient Care Overview  Goal: Plan of Care Review  Outcome: Ongoing (interventions implemented as appropriate)   02/06/19 2342 02/07/19 0720   Coping/Psychosocial   Plan of Care Reviewed With --  patient   Plan of Care Review   Progress no change --      Goal: Individualization and Mutuality  Outcome: Ongoing (interventions implemented as appropriate)    Goal: Discharge Needs Assessment  Outcome: Ongoing (interventions implemented as appropriate)   02/06/19 1841   Disability   Equipment Currently Used at Home wheelchair;walker, rolling   Discharge Needs Assessment   Patient/Family Anticipates Transition to home with family   Patient/Family Anticipated Services at Transition    Transportation Concerns car, none   Transportation Anticipated family or friend will provide     Goal: Interprofessional Rounds/Family Conf  Outcome: Ongoing (interventions implemented as appropriate)      Problem: Skin Injury Risk (Adult)  Goal: Identify Related Risk Factors and Signs and Symptoms  Outcome: Ongoing (interventions implemented as appropriate)   02/06/19 2342   Skin Injury Risk (Adult)   Related Risk Factors (Skin Injury Risk) advanced age;fluid intake inadequate;infection;medication;mobility impaired;nutritional deficiencies;tissue perfusion altered     Goal: Skin Health and Integrity  Outcome: Ongoing (interventions implemented as appropriate)   02/06/19 2342   Skin Injury Risk (Adult)   Skin Health and Integrity making progress toward outcome       Problem: Fall Risk (Adult)  Goal: Identify Related Risk Factors and Signs and Symptoms  Outcome: Ongoing (interventions implemented as appropriate)   02/06/19 2342   Fall Risk (Adult)   Related Risk Factors (Fall Risk) age-related changes;confusion/agitation;fatigue/slow reaction;gait/mobility problems;history of falls;impaired vision;objects hard to reach;environment unfamiliar;slippery/uneven surfaces   Signs and Symptoms (Fall Risk) presence of risk factors      Goal: Absence of Fall  Outcome: Ongoing (interventions implemented as appropriate)   02/06/19 2342   Fall Risk (Adult)   Absence of Fall making progress toward outcome       Problem: Activity Intolerance (Adult)  Goal: Identify Related Risk Factors and Signs and Symptoms  Outcome: Ongoing (interventions implemented as appropriate)   02/06/19 2342   Activity Intolerance (Adult)   Related Risk Factors (Activity Intolerance) generalized weakness;O2 supply/demand imbalance     Goal: Activity Tolerance  Outcome: Ongoing (interventions implemented as appropriate)   02/06/19 2342   Activity Intolerance (Adult)   Activity Tolerance making progress toward outcome     Goal: Effective Energy Conservation Techniques  Outcome: Ongoing (interventions implemented as appropriate)   02/06/19 2342   Activity Intolerance (Adult)   Effective Energy Conservation Techniques making progress toward outcome

## 2019-02-07 NOTE — H&P
[facility] HOSPITALIST HISTORY AND PHYSICAL    Patient Identification:  Name:  Floridalma Bryant  Age:  84 y.o.  Sex:  female  :  1934  MRN:  [id number]   Visit Number:  [id number]  Primary Care Physician:  Nicola Resendiz APRN     2019  6:38 PM    Subjective     Chief complaint: Cough, altered mental status.     History of presenting illness:   Ms. Jimenez is a 84 y.o. female with past medical history significant for systolic CHF with EF 40%, paroxsymal atrial fibrillation, chronic anticoagulation with Xarelto, IDDM, hypertension, dyslipidemia, recurrent UTIs, and dementia. She has known lung nodules which the family have elected to not have evaluated further due to her advanced age and dementia.   The patient was visited by home health for PT today and was noted to be lethargic and was unable to participate in therapy. Her daughter at bedside reports that she has had a decline in her baseline mental status since yesterday as well. She has a chronic cough, however, it has become more severe over the last few days with production of green sputum. She was taken to the office of her PCP today where she was noted to have a fever of 101.9F. She was tested for influenza which was negative. She had a CXR in their office showing a reported right lower lobe pneumonia. O2sats were reported to be in the low 90s on room air. Hospitalist services were contacted for direct admission. She has been accepted to the medical/surgery unit for further evaluation and therapy. The patient is currently awake but appears weak and is very heard of hearing; she cannot really give any history and is only oriented to self. She will obey simple commands and is moving all 4 extremities. At baseline, she lives at home with 24 hour care and home health with nursing care and PT. Her daughter and son-in-law live next door. She is generally able to get up and bathe with assistance and is able to carry on some conversation and  recognizes family members per her daughter at bedside.  ---------------------------------------------------------------------------------------------------------------------   Review of Systems   Unable to perform ROS: Mental status change   Respiratory: Positive for cough.    Cardiovascular: Negative for chest pain.   Gastrointestinal: Negative for abdominal distention.      ---------------------------------------------------------------------------------------------------------------------   Past Medical History:   Diagnosis Date   • Afib (CMS/[facility])    • Dementia    • Dementia    • Diabetes mellitus (CMS/[facility])    • Hypertension    • Pancreatitis    • Systolic CHF (CMS/[facility])        Past Surgical History:   Procedure Laterality Date   • ANKLE SURGERY     • HYSTERECTOMY     • SHOULDER SURGERY     • TYMPANOPLASTY       History reviewed. No pertinent family history.  Social History     Socioeconomic History   • Marital status:      Spouse name: Not on file   • Number of children: Not on file   • Years of education: Not on file   • Highest education level: Not on file   Tobacco Use   • Smoking status: Never Smoker   • Smokeless tobacco: Never Used   Substance and Sexual Activity   • Alcohol use: No   • Drug use: No   • Sexual activity: Defer     ---------------------------------------------------------------------------------------------------------------------   Allergies:  Codeine and Metformin and related  ---------------------------------------------------------------------------------------------------------------------   Prior to Admission Medications     Prescriptions Last Dose Informant Patient Reported? Taking?    acetaminophen (TYLENOL) 325 MG tablet   No No    Take 2 tablets by mouth Every 4 (Four) Hours As Needed for Headache or Fever (temperature greater than 101.5 F).    atorvastatin (LIPITOR) 80 MG tablet   No No    Take 1 tablet by mouth Every Night.    busPIRone (BUSPAR) 10 MG tablet   Yes No     Take 10 mg by mouth 2 (Two) Times a Day.    carvedilol (COREG) 6.25 MG tablet   No No    Take 1 tablet by mouth 2 (Two) Times a Day With Meals.    cefuroxime (CEFTIN) 250 MG tablet   Yes No    Take 250 mg by mouth 2 (Two) Times a Day.    ciprofloxacin-dexamethasone (CIPRODEX) 0.3-0.1 % otic suspension   Yes No    4 drops 2 (Two) Times a Day.    citalopram (CeleXA) 10 MG tablet   Yes No    Take 20 mg by mouth Daily.    clopidogrel (PLAVIX) 75 MG tablet   No No    Take 1 tablet by mouth Daily.    docusate calcium (SURFAK) 240 MG capsule   Yes No    Take 240 mg by mouth 2 (Two) Times a Day.    donepezil (ARICEPT) 5 MG tablet   Yes No    Take 5 mg by mouth Every Night.    ferrous sulfate 325 (65 FE) MG tablet  Child Yes No    Take 325 mg by mouth Daily With Breakfast.    Fexofenadine HCl (ALLEGRA PO)  Child Yes No    Take 180 mg by mouth 2 (Two) Times a Day.    fluticasone (FLONASE) 50 MCG/ACT nasal spray   Yes No    2 sprays into each nostril Daily.    furosemide (LASIX) 20 MG tablet   No No    Take 2 tablets by mouth Daily.    Patient taking differently:  Take 40 mg by mouth Every Other Day As Needed.    glucagon (GLUCAGEN) 1 MG injection   Yes No    Infuse  into a venous catheter 1 (One) Time.    guaiFENesin (ROBITUSSIN) 100 MG/5ML solution oral solution   No No    Take 10 mL by mouth Every 6 (Six) Hours As Needed (cough and congestion).    haloperidol (HALDOL) 5 MG tablet   Yes No    Take 5 mg by mouth 4 (Four) Times a Day.    hydrALAZINE (APRESOLINE) 10 MG tablet   Yes No    Take 10 mg by mouth 3 (Three) Times a Day.    hydrocortisone 0.5 % cream   Yes No    Apply  topically 2 (Two) Times a Day.    hydrOXYzine (ATARAX) 25 MG tablet   No No    Take 1 tablet by mouth Every 8 (Eight) Hours As Needed for Itching.    hydrOXYzine (VISTARIL) 25 MG capsule   Yes No    Take 25 mg by mouth 3 (Three) Times a Day As Needed for Itching.    insulin aspart (novoLOG) 100 UNIT/ML injection   No No    Inject 10 Units under the skin  3 (Three) Times a Day With Meals.    insulin detemir (LEVEMIR) 100 UNIT/ML injection   No No    Inject 25 Units under the skin Every Night.    ipratropium (ATROVENT) 0.02 % nebulizer solution   No No    Take 2.5 mL by nebulization 4 (Four) Times a Day As Needed for Wheezing or Shortness of Air.    ipratropium-albuterol (DUO-NEB) 0.5-2.5 mg/mL nebulizer   No No    Take 3 mL by nebulization Every 4 (Four) Hours As Needed for Shortness of Air.    isosorbide mononitrate (IMDUR) 30 MG 24 hr tablet   No No    Take 1 tablet by mouth Daily.    levothyroxine (SYNTHROID, LEVOTHROID) 25 MCG tablet   Yes No    Take 50 mcg by mouth Daily.    linagliptin (TRADJENTA) 5 MG tablet tablet  Child Yes No    Take 5 mg by mouth Daily.    loperamide (IMODIUM) 2 MG capsule   No No    Take 1 capsule by mouth 3 (Three) Times a Day As Needed for Diarrhea.    losartan (COZAAR) 100 MG tablet   Yes No    Take 100 mg by mouth Daily.    nystatin (MYCOSTATIN) 369403 UNIT/GM cream   Yes No    Apply  topically 2 (Two) Times a Day.    omeprazole (priLOSEC) 20 MG capsule  Child Yes No    Take 20 mg by mouth Daily.    potassium chloride (K-DUR) 10 MEQ CR tablet   Yes No    Take 10 mEq by mouth 2 (Two) Times a Day.    prednisoLONE acetate (PRED FORTE) 1 % ophthalmic suspension   Yes No    1 drop 4 (Four) Times a Day.    rivaroxaban (XARELTO) 15 MG tablet   No No    Take 1 tablet by mouth Daily With Dinner.    rivastigmine (EXELON) 9.5 MG/24HR patch  Child Yes No    Place 1 patch on the skin Daily.    rivastigmine (EXELON) 9.5 MG/24HR patch   Yes No    Place 1 patch on the skin Daily.    valsartan (DIOVAN) 80 MG tablet   No No    Take 1 tablet by mouth Daily.    vitamin D (ERGOCALCIFEROL) 32266 units capsule capsule  Child Yes No    Take 50,000 Units by mouth 1 (One) Time Per Week.        ---------------------------------------------------------------------------------------------------------------------  Objective   Hospital Scheduled  Meds:    ceftriaxone 1 g Intravenous Q24H   doxycycline 100 mg Intravenous Q12H   insulin aspart 0-7 Units Subcutaneous 4x Daily AC & at Bedtime   metroNIDAZOLE 500 mg Intravenous Q8H   sodium chloride 3 mL Intravenous Q12H       sodium chloride 75 mL/hr     ---------------------------------------------------------------------------------------------------------------------   Vital Signs:  Temp:  [98.4 °F (36.9 °C)] 98.4 °F (36.9 °C)  Heart Rate:  [88] 88  Resp:  [18] 18  BP: (96)/(63) 96/63  No data found.  SpO2 Percentage    02/06/19 1800   SpO2: 94%     SpO2:  [94 %] 94 %  on   ;        Body mass index is 26.59 kg/m².  Wt Readings from Last 3 Encounters:   02/06/19 53.8 kg (118 lb 9.6 oz)   07/17/18 65 kg (143 lb 6.4 oz)   05/27/18 71.7 kg (158 lb)     ---------------------------------------------------------------------------------------------------------------------   Physical Exam:  Constitutional:  Elderly female, chronically ill in appearance. Awake at this time, non-toxic in appearance, very hard of hearing  HENT:  Head: Normocephalic and atraumatic.  Mouth: Somewhat dry.   Eyes:  Conjunctivae and EOM are normal. No scleral icterus. No erythema or drainage. PERRL.  Neck:  Neck supple.  No JVD present. No carotid bruits noted.   Cardiovascular:  Normal rate, regular rhythm and normal heart sounds with no murmur.  Pulmonary/Chest: Poor inspiratory effort. Increased effort elicits hacking cough. No respiratory distress, no wheezes. Rales noted in the right middle and lower lung fields.   Abdominal:  Soft.  Bowel sounds are present x4.  No distension and no tenderness.   Musculoskeletal:  No edema, no tenderness, and no deformity.  No red or swollen joints anywhere.    Neurological: Awake. Oriented to self but not age, time or place. Can follow simple commands. No cranial nerve deficit.  No tongue deviation.  No facial droop.  No slurred speech. Negative babinski bilaterally.   Skin:  Skin is warm and dry.  No  rash noted.  No pallor.   Psychiatric: Lethargic.   Peripheral vascular:  No edema and 2+ pedal pulses bilaterally.   Genitourinary: No bernard catheter in place.   ---------------------------------------------------------------------------------------------------------------------  EKG: NSR, HR 87, QTc 500 but 450 when corrected for RBBB. Left axis deviation. No overt ST changes to suggest acute ishemia appreciated.    Telemetry:  Not yet on telemetry monitoring.     I have personally reviewed the EKG/Telemetry strip.  ---------------------------------------------------------------------------------------------------------------------           Results from last 7 days   Lab Units 02/06/19  1907   PH, ARTERIAL pH units 7.461*   PO2 ART mm Hg 103.0*   PCO2, ARTERIAL mm Hg 34.3*   HCO3 ART mmol/L 23.9     Results from last 7 days   Lab Units 02/06/19  1933   WBC 10*3/mm3 14.56*   HEMOGLOBIN g/dL 12.6   HEMATOCRIT % 39.9   MCV fL 89.5   MCHC g/dL 31.6*   PLATELETS 10*3/mm3 241         Invalid input(s): PROTCrCl cannot be calculated (Patient's most recent lab result is older than the maximum 30 days allowed.).  No results found for: AMMONIA    No results found for: HGBA1C, POCGLU  Lab Results   Component Value Date    HGBA1C 8.90 (H) 10/26/2017     Lab Results   Component Value Date    TSH 2.215 10/28/2017     Pain Management Panel     There is no flowsheet data to display.        I have personally reviewed the above laboratory results.   ---------------------------------------------------------------------------------------------------------------------  Imaging Results (last 7 days)     Procedure Component Value Units Date/Time    XR Chest 1 View [[id number]] Updated:  02/06/19 1933      Hazy right sided cardiac sillhouette border suggestive of possible right lower lobe infiltrate.    I have personally reviewed the above radiology results.    ---------------------------------------------------------------------------------------------------------------------  Assessment & Plan        -Severe sepsis, present on admission, as noted by WBC count >14,000 and reported fever of 101.9F (further labs pending) and encephalopathy, secondary to acute community acquired versus aspiration right lower lobe pneumonia: Check blood cultures x2. Supplemental O2 via nasal cannula to maintain oxygen saturations >90%. Rule out atypical organisms by checking urinary legionella and mycoplasma studies. Check respiratory cultures. Place on IV rocephin, doxycycline, and flagyl to cover for community acquired and aspiration pneumonia. Will add nebulizer treatments once lactic acid has returned. Will keep NPO for now except for sips with meds. Consult SLP for swallow evaluation based on family's reports of history of dysphagia with aspiration in the past (daughter states she her swallowing has not been evaluated in the last 2 years). Place on gentle IVF hydration with normal saline at 75cc/hr. Repeat CBC and CRP in AM.     -Encephalopathy superimposed on baseline dementia, likely secondary to above sepsis. STAT CT head and chest pending. Basic labs also still pending. Continue treatment for pneumonia as noted above.    -Chronic UTIs: UA with culture if indicated pending. She is reported to have been on chronic Macrobid therapy since 09/2018 for recurrent UTIs. She also just recently finished a 10 day course of Augmentin last Friday. She took Diflucan last evening for reported yeast in an outpatient urinalysis and was due for another dose tomorrow. Await reconciliation of home meds by pharmacy.    -IDDM: Will place on low dose sliding scale insulin with finger stick blood glucose readings AC and HS. Place on hypoglycemia protocol. Check hemoglobin A1c level. Review home insulin regimen once meds reconciled by pharmacy.    -Paroxysmal atrial fibrillation: Chronically anticoagulated  with Xarelto for stroke prevention. STAT EKG above shows patient currently in sinus rhythm. Monitor on telemetry. Monitor electrolytes. TSH and magnesium level pending.     -Chronic systolic congestive heart failure: Last known EF at 40% in 10/2017. She does not appear to be fluid overloaded at this point. Check BNP. Chest XR not felt to show pulmonary edema.      -History of essential hypertension: now borderline hypotensive. Will give light IVF and hold antihypertensive medications for now. Add IV hydralazine 10mg TID PRN for SBP >160.     -Baseline dementia: Place on fall and aspiration precautions. Bedside sitter ordered.    -History of pulmonary nodules: Family did not wish to pursue further invasive evaluation. Will obtain CT chest to evaluate further and ensure pneumonia is not post-obstructive in etiology.     -Thoracic back pain: CT chest pending, will see if there are any obvious bony abnormalities on that scan.    -DVT Prophylaxis: Xarelto at home.    * The patient is considered to be a high risk patient due to: altered mental status, severe sepsis, pneumonia, advanced age with dementia, CHF, EF 40%.     Code Status: No CPR. Daughter is agreeable to short term intubation. Otherwise full interventions.     I have discussed the patient's assessment and plan with the patient's daughter, son-in-law, and admitting physician, Dr. Costa.     Sebastian Costa MD  02/06/19  8:11 PM  ---------------------------------------------------------------------------------------------------------------------   * I have seen the patient in conjunction with KACI Chase, and have amended her note to reflect my own findings, assessment and plan. ANNIKA Singh accompanied me and was present during the entire interview and physical examination on [facility].

## 2019-02-07 NOTE — PLAN OF CARE
Problem: Patient Care Overview  Goal: Plan of Care Review  Outcome: Ongoing (interventions implemented as appropriate)   02/07/19 7469   Coping/Psychosocial   Plan of Care Reviewed With patient   Plan of Care Review   Progress Improving    Pt participated in MBS w/ intermittent silent aspiration w/ thin liquids.

## 2019-02-07 NOTE — PROGRESS NOTES
Patient was initiated on Rocephin, doxycycline, and Flagyl for CAP with possible aspiration. Will start Rocephin at 1 gm daily. Will start doxycycline at 100 mg bid, and will start Flagyl at 500 mg q8 hrs.    Thank you for these consults.    Syl Edgar, [facility]

## 2019-02-07 NOTE — PLAN OF CARE
Problem: Patient Care Overview  Goal: Plan of Care Review  Outcome: Ongoing (interventions implemented as appropriate)      Problem: Skin Injury Risk (Adult)  Goal: Identify Related Risk Factors and Signs and Symptoms  Outcome: Ongoing (interventions implemented as appropriate)   02/06/19 2342   Skin Injury Risk (Adult)   Related Risk Factors (Skin Injury Risk) advanced age;fluid intake inadequate;infection;medication;mobility impaired;nutritional deficiencies;tissue perfusion altered     Goal: Skin Health and Integrity  Outcome: Ongoing (interventions implemented as appropriate)   02/06/19 2342   Skin Injury Risk (Adult)   Skin Health and Integrity making progress toward outcome       Problem: Fall Risk (Adult)  Goal: Identify Related Risk Factors and Signs and Symptoms  Outcome: Ongoing (interventions implemented as appropriate)   02/06/19 2342   Fall Risk (Adult)   Related Risk Factors (Fall Risk) age-related changes;confusion/agitation;fatigue/slow reaction;gait/mobility problems;history of falls;impaired vision;objects hard to reach;environment unfamiliar;slippery/uneven surfaces   Signs and Symptoms (Fall Risk) presence of risk factors     Goal: Absence of Fall  Outcome: Ongoing (interventions implemented as appropriate)   02/06/19 2342   Fall Risk (Adult)   Absence of Fall making progress toward outcome       Problem: Activity Intolerance (Adult)  Goal: Identify Related Risk Factors and Signs and Symptoms  Outcome: Ongoing (interventions implemented as appropriate)    Goal: Activity Tolerance  Outcome: Ongoing (interventions implemented as appropriate)   02/06/19 2342   Activity Intolerance (Adult)   Activity Tolerance making progress toward outcome     Goal: Effective Energy Conservation Techniques  Outcome: Ongoing (interventions implemented as appropriate)   02/06/19 2342   Activity Intolerance (Adult)   Effective Energy Conservation Techniques making progress toward outcome

## 2019-02-08 LAB
A-A DO2: 110.1 MMHG (ref 0–300)
ALBUMIN SERPL-MCNC: 2.9 G/DL (ref 3.4–4.8)
ALBUMIN/GLOB SERPL: 1.5 G/DL (ref 1.5–2.5)
ALP SERPL-CCNC: 49 U/L (ref 35–104)
ALT SERPL W P-5'-P-CCNC: 5 U/L (ref 10–36)
ANION GAP SERPL CALCULATED.3IONS-SCNC: 10.6 MMOL/L (ref 3.6–11.2)
AST SERPL-CCNC: 12 U/L (ref 10–30)
ATMOSPHERIC PRESS: 734 MMHG
BASE EXCESS BLDV CALC-SCNC: -0.3 MMOL/L
BASOPHILS # BLD AUTO: 0.03 10*3/MM3 (ref 0–0.3)
BASOPHILS NFR BLD AUTO: 0.4 % (ref 0–2)
BDY SITE: NORMAL
BILIRUB SERPL-MCNC: 0.4 MG/DL (ref 0.2–1.8)
BODY TEMPERATURE: 98.6 C
BUN BLD-MCNC: 10 MG/DL (ref 7–21)
BUN/CREAT SERPL: 18.9 (ref 7–25)
CALCIUM SPEC-SCNC: 7.9 MG/DL (ref 7.7–10)
CHLORIDE SERPL-SCNC: 121 MMOL/L (ref 99–112)
CO2 SERPL-SCNC: 18.4 MMOL/L (ref 24.3–31.9)
CREAT BLD-MCNC: 0.53 MG/DL (ref 0.43–1.29)
CRP SERPL-MCNC: 4.69 MG/DL (ref 0–0.99)
DEPRECATED RDW RBC AUTO: 53.1 FL (ref 37–54)
EOSINOPHIL # BLD AUTO: 0.11 10*3/MM3 (ref 0–0.7)
EOSINOPHIL NFR BLD AUTO: 1.4 % (ref 0–7)
ERYTHROCYTE [DISTWIDTH] IN BLOOD BY AUTOMATED COUNT: 16.3 % (ref 11.5–14.5)
GFR SERPL CREATININE-BSD FRML MDRD: 110 ML/MIN/1.73
GLOBULIN UR ELPH-MCNC: 1.9 GM/DL
GLUCOSE BLD-MCNC: 116 MG/DL (ref 70–110)
GLUCOSE BLDC GLUCOMTR-MCNC: 119 MG/DL (ref 70–130)
GLUCOSE BLDC GLUCOMTR-MCNC: 130 MG/DL (ref 70–130)
GLUCOSE BLDC GLUCOMTR-MCNC: 147 MG/DL (ref 70–130)
GLUCOSE BLDC GLUCOMTR-MCNC: 153 MG/DL (ref 70–130)
HCO3 BLDV-SCNC: 25.1 MMOL/L
HCT VFR BLD AUTO: 34.7 % (ref 37–47)
HGB BLD-MCNC: 10.5 G/DL (ref 12–16)
HGB BLDA-MCNC: 12.2 G/DL (ref 12–16)
HOROWITZ INDEX BLD+IHG-RTO: 28 %
IMM GRANULOCYTES # BLD AUTO: 0.05 10*3/MM3 (ref 0–0.03)
IMM GRANULOCYTES NFR BLD AUTO: 0.6 % (ref 0–0.5)
LYMPHOCYTES # BLD AUTO: 1.4 10*3/MM3 (ref 1–3)
LYMPHOCYTES NFR BLD AUTO: 17.8 % (ref 16–46)
MAGNESIUM SERPL-MCNC: 1.7 MG/DL (ref 1.7–2.6)
MCH RBC QN AUTO: 28.4 PG (ref 27–33)
MCHC RBC AUTO-ENTMCNC: 30.3 G/DL (ref 33–37)
MCV RBC AUTO: 93.8 FL (ref 80–94)
MODALITY: NORMAL
MONOCYTES # BLD AUTO: 1.01 10*3/MM3 (ref 0.1–0.9)
MONOCYTES NFR BLD AUTO: 12.8 % (ref 0–12)
NEUTROPHILS # BLD AUTO: 5.26 10*3/MM3 (ref 1.4–6.5)
NEUTROPHILS NFR BLD AUTO: 67 % (ref 40–75)
OSMOLALITY SERPL CALC.SUM OF ELEC: 298 MOSM/KG (ref 273–305)
PCO2 BLDV: 43.8 MM HG
PH BLDV: 7.38 PH UNITS
PHOSPHATE SERPL-MCNC: 2.2 MG/DL (ref 2.7–4.5)
PLATELET # BLD AUTO: 191 10*3/MM3 (ref 130–400)
PMV BLD AUTO: 10.4 FL (ref 6–10)
PO2 BLDV: 30.6 MM HG
POTASSIUM BLD-SCNC: 4.6 MMOL/L (ref 3.5–5.3)
PROT SERPL-MCNC: 4.8 G/DL (ref 6–8)
RBC # BLD AUTO: 3.7 10*6/MM3 (ref 4.2–5.4)
SAO2 % BLDCOV: 57.3 %
SODIUM BLD-SCNC: 150 MMOL/L (ref 135–153)
WBC NRBC COR # BLD: 7.86 10*3/MM3 (ref 4.5–12.5)

## 2019-02-08 PROCEDURE — 83735 ASSAY OF MAGNESIUM: CPT | Performed by: INTERNAL MEDICINE

## 2019-02-08 PROCEDURE — 86140 C-REACTIVE PROTEIN: CPT | Performed by: INTERNAL MEDICINE

## 2019-02-08 PROCEDURE — 80053 COMPREHEN METABOLIC PANEL: CPT | Performed by: INTERNAL MEDICINE

## 2019-02-08 PROCEDURE — 25010000002 CEFTRIAXONE

## 2019-02-08 PROCEDURE — 94799 UNLISTED PULMONARY SVC/PX: CPT

## 2019-02-08 PROCEDURE — 82805 BLOOD GASES W/O2 SATURATION: CPT | Performed by: INTERNAL MEDICINE

## 2019-02-08 PROCEDURE — 63710000001 INSULIN ASPART PER 5 UNITS: Performed by: PHYSICIAN ASSISTANT

## 2019-02-08 PROCEDURE — 82962 GLUCOSE BLOOD TEST: CPT

## 2019-02-08 PROCEDURE — 84100 ASSAY OF PHOSPHORUS: CPT | Performed by: INTERNAL MEDICINE

## 2019-02-08 PROCEDURE — 85025 COMPLETE CBC W/AUTO DIFF WBC: CPT | Performed by: INTERNAL MEDICINE

## 2019-02-08 PROCEDURE — 92526 ORAL FUNCTION THERAPY: CPT

## 2019-02-08 PROCEDURE — 99233 SBSQ HOSP IP/OBS HIGH 50: CPT | Performed by: INTERNAL MEDICINE

## 2019-02-08 RX ORDER — MAGNESIUM SULFATE HEPTAHYDRATE 40 MG/ML
4 INJECTION, SOLUTION INTRAVENOUS ONCE
Status: COMPLETED | OUTPATIENT
Start: 2019-02-08 | End: 2019-02-08

## 2019-02-08 RX ORDER — BENZONATATE 100 MG/1
200 CAPSULE ORAL 3 TIMES DAILY PRN
Status: DISCONTINUED | OUTPATIENT
Start: 2019-02-08 | End: 2019-02-09 | Stop reason: HOSPADM

## 2019-02-08 RX ADMIN — METRONIDAZOLE 500 MG: 500 INJECTION, SOLUTION INTRAVENOUS at 21:03

## 2019-02-08 RX ADMIN — DOXYCYCLINE 100 MG: 100 INJECTION, POWDER, LYOPHILIZED, FOR SOLUTION INTRAVENOUS at 11:51

## 2019-02-08 RX ADMIN — I-VITE, TAB 1000-60-2MG (60/BT) 1 TABLET: TAB at 09:41

## 2019-02-08 RX ADMIN — PANTOPRAZOLE SODIUM 40 MG: 40 TABLET, DELAYED RELEASE ORAL at 06:31

## 2019-02-08 RX ADMIN — RIVASTIGMINE 1 PATCH: 9.5 PATCH, EXTENDED RELEASE TRANSDERMAL at 09:43

## 2019-02-08 RX ADMIN — GUAIFENESIN 1200 MG: 600 TABLET, EXTENDED RELEASE ORAL at 09:41

## 2019-02-08 RX ADMIN — Medication 1 CAPSULE: at 09:41

## 2019-02-08 RX ADMIN — BENZONATATE 200 MG: 100 CAPSULE ORAL at 21:23

## 2019-02-08 RX ADMIN — SODIUM CHLORIDE, PRESERVATIVE FREE 3 ML: 5 INJECTION INTRAVENOUS at 21:04

## 2019-02-08 RX ADMIN — INSULIN ASPART 2 UNITS: 100 INJECTION, SOLUTION INTRAVENOUS; SUBCUTANEOUS at 16:57

## 2019-02-08 RX ADMIN — CITALOPRAM HYDROBROMIDE 20 MG: 20 TABLET ORAL at 09:41

## 2019-02-08 RX ADMIN — GUAIFENESIN 1200 MG: 600 TABLET, EXTENDED RELEASE ORAL at 21:03

## 2019-02-08 RX ADMIN — LEVOTHYROXINE SODIUM 50 MCG: 50 TABLET ORAL at 09:41

## 2019-02-08 RX ADMIN — CLOPIDOGREL 75 MG: 75 TABLET, FILM COATED ORAL at 09:41

## 2019-02-08 RX ADMIN — MAGNESIUM SULFATE HEPTAHYDRATE 4 G: 40 INJECTION, SOLUTION INTRAVENOUS at 05:01

## 2019-02-08 RX ADMIN — FERROUS SULFATE TAB 325 MG (65 MG ELEMENTAL FE) 325 MG: 325 (65 FE) TAB at 09:41

## 2019-02-08 RX ADMIN — SODIUM PHOSPHATE, MONOBASIC, MONOHYDRATE 15 MMOL: 276; 142 INJECTION, SOLUTION INTRAVENOUS at 17:25

## 2019-02-08 RX ADMIN — METRONIDAZOLE 500 MG: 500 INJECTION, SOLUTION INTRAVENOUS at 14:48

## 2019-02-08 RX ADMIN — RIVAROXABAN 15 MG: 15 TABLET, FILM COATED ORAL at 17:25

## 2019-02-08 RX ADMIN — METRONIDAZOLE 500 MG: 500 INJECTION, SOLUTION INTRAVENOUS at 05:00

## 2019-02-08 RX ADMIN — CEFTRIAXONE 1 G: 1 INJECTION, POWDER, FOR SOLUTION INTRAMUSCULAR; INTRAVENOUS at 21:03

## 2019-02-08 RX ADMIN — SODIUM CHLORIDE 75 ML/HR: 9 INJECTION, SOLUTION INTRAVENOUS at 09:40

## 2019-02-08 NOTE — PROGRESS NOTES
Acute Care - Speech Language Pathology   Swallow Treatment Note  [facility]     Patient Name: Floridalma Bryant  : 1934  MRN: [id number]  Today's Date: 2019             Admit Date: 2019    Visit Dx:    No diagnosis found.  Patient Active Problem List   Diagnosis   • NSTEMI (non-ST elevated myocardial infarction) (CMS/HCC)   • Essential hypertension   • Type II diabetes mellitus (CMS/HCC)   • Dementia   • Pulmonary nodules   • RBBB   • Chronic right basal ganglia lacunar infarct on previous head CT   • Advanced age   • Hiatal hernia   • GERD (gastroesophageal reflux disease)   • Shortness of breath   • Hypoxia   • Sepsis (CMS/HCC)     Ms. Bryant is seen at bedside this am on [facility]. She is a/a this am, sitter is present. She is verbally interactive today, confused and irritable. She evidences w/ baseline sporadic productive cough before, during and after this f/u, no direct correlation to po presentations. Observed sputum is clear and somewhat thick. She is s/p MBS 19 w/ intermittent silent aspiration w/ thin liquids. She accepts minimal po presentation of solid cracker x1 only, refuses further presentations, and nectar thick liquid via straw x3. No overt s/s aspiration, no s/s indicative of silent aspiration as she is w/o changes in vocal quality, respirations or secretions post po presentations. She is unable to fnx participate in trial dysphagia tx 2/2 current irritation and ams.     Ms. Bryant verbalizes dislike for nectar thick liquids. Sitter denies pt w/ overt s/s aspiration w/ po intake. Family friend/caregiver is present upon SLP exit, d/w her recommendations for nectar thick liquids w/ verbal understanding.     EDUCATION  The patient has been educated in the following areas:   Dysphagia (Swallowing Impairment) Modified Diet Instruction.    SLP Recommendation and Plan  Continue current mechanical soft diet w/ nectar thick liquids as tolerated. SLP to continue to f/u for diet tolerance, trial dysphagia  tx pending pt status to functionally participate.     Diet modification sign posted at head of bed for staff and family reference.     Thank you-  Janis Bhat M.A., CCC-SLP    Plan of Care Reviewed With: patient  Plan of Care Review  Plan of Care Reviewed With: patient  Progress: no change    Time Calculation:   Time Calculation- SLP     Row Name 02/08/19 1201             Time Calculation- SLP    SLP - Next Appointment  02/11/19  -        User Key  (r) = Recorded By, (t) = Taken By, (c) = Cosigned By    Initials Name Provider Type    Janis Miller MA,CCC-SLP Speech Therapist        Therapy Charges for Today     Code Description Service Date Service Provider Modifiers Qty    90480480681 HC ST MOTION FLUORO EVAL SWALLOW 8 2/7/2019 Janis Bhat MA,CCC-SLP GN 1    98719239048 HC ST TREATMENT SWALLOW 2 2/8/2019 Janis Bhat MA,CCC-SLP GN 1          Janis Bhat MA,CCC-SLP  2/8/2019

## 2019-02-08 NOTE — PLAN OF CARE
Problem: Patient Care Overview  Goal: Plan of Care Review  Outcome: Ongoing (interventions implemented as appropriate)   02/08/19 1201   Coping/Psychosocial   Plan of Care Reviewed With patient   Plan of Care Review   Progress no change    Pt seen at bedside this am in 3N. She is a/a, upright and centered in bed, tolerating modified po diet w/o overt s/s aspiration. SLP to continue to f/u for diet tolerance.

## 2019-02-08 NOTE — PAYOR COMM NOTE
"[facility]  NPI: [id number]    Utilization Review   Contact:Nani Paez MSN, APRN, NP-C  Phone: [phone]  Fax: [phone]    humana mr/ Attn: gema  Additional clinical  REF# [id number]    Floridalma Redmond (84 y.o. Female)     Date of Birth Social Security Number Address Home Phone MRN    1934  [address] [facility] [place] [address] [phone] [id number]    Mandaen Marital Status          Protestant        Admission Date Admission Type Admitting Provider Attending Provider Department, Room/Bed    2/6/19 Urgent Jaycob Coy DO Troxell, Christopher A, DO [address], 3347/1S    Discharge Date Discharge Disposition Discharge Destination                       Attending Provider:  Jaycob Coy DO    Allergies:  Codeine, Metformin And Related    Isolation:  None   Infection:  None   Code Status:  No CPR    Ht:  142.2 cm (56\")   Wt:  57 kg (125 lb 11.2 oz)    Admission Cmt:  None   Principal Problem:  None                Active Insurance as of 2/6/2019     Primary Coverage     Payor Plan Insurance Group Employer/Plan Group    HUMANA MEDICARE REPLACEMENT HUMANA MEDICARE REPL [id number]     Payor Plan Address Payor Plan Phone Number Payor Plan Fax Number Effective Dates    [address] [address] [phone]  7/1/2018 - None Entered    [facility] [id number]       Subscriber Name Subscriber Birth Date Member ID       FLORIDALMA REDMOND 1934 [id number]           Secondary Coverage     Payor Plan Insurance Group Employer/Plan Group    [place]      Payor Plan Address Payor Plan Phone Number Payor Plan Fax Number Effective Dates    [address] [phone]  4/16/2018 - None Entered    [facility] [id number]       Subscriber Name Subscriber Birth Date Member ID       FLORIDALMA REDMOND 1934 [id number]                 Emergency Contacts      (Rel.) Home Phone Work Phone Mobile Phone    Jaz Loredo (Power of ) " [phone] -- --            ICU Vital Signs     Row Name 19 1100 19 0926 19 0730 19 0625 19 0300       Vitals    Temp  98.4 °F (36.9 °C)  --  --  98.4 °F (36.9 °C)  98 °F (36.7 °C)    Temp src  Oral  --  --  Oral  Oral    Pulse  72  --  --  80  72    Heart Rate Source  Monitor  --  --  Monitor  Monitor    Resp  18  --  --  18  18    Resp Rate Source  Visual  --  --  Visual  Visual    BP  136/60  --  --  121/60  108/87    BP Location  Left arm  --  --  Left arm  Left arm    BP Method  Automatic  --  --  Automatic  Automatic    Patient Position  Lying  --  --  Lying  Lying       Oxygen Therapy    SpO2  --  98 %  --  97 %  --    Device (Oxygen Therapy)  --  nasal cannula  nasal cannula  --  --    Flow (L/min)  --  2  2  --  --    Row Name 19 2300 19 2100 19 1900 19 1616          Vitals    Temp  98.2 °F (36.8 °C)  --  98.3 °F (36.8 °C)  97.9 °F (36.6 °C)     Temp src  Oral  --  Oral  Oral     Pulse  71  --  76  78     Heart Rate Source  Monitor  --  Monitor  Monitor     Resp  18  --  18  18     Resp Rate Source  Visual  --  Visual  Visual     BP  147/54  --  142/50  120/63     BP Location  Left arm  --  Left arm  Left arm     BP Method  Automatic  --  Automatic  Automatic     Patient Position  Lying  --  Lying  Lying        Oxygen Therapy    Device (Oxygen Therapy)  --  nasal cannula  --  --     Flow (L/min)  --  2  --  --         Janis Bhat MA,CCC-SLP   Speech Therapist   Speech Therapy   MBS/VFSS/FEES   Signed   Date of Service:  2019  3:46 PM               Signed        Expand All Collapse All           Show:Clear all  [x]Manual[x]Template[]Copied    Added by:  [x]Janis Bhat MA,CCC-SLP      []Omayra for details      Acute Care - Speech Language Pathology   Swallow Initial Evaluation DEQUAN Vasquez   MODIFIED BARIUM SWALLOW STUDY     Patient Name: Floridalma Bryant                   : 1934                        MRN:  [id number]  Today's Date: 2/7/2019                                                                          Admit Date: 2/6/2019     Visit Dx:   No diagnosis found.      Patient Active Problem List   Diagnosis   • NSTEMI (non-ST elevated myocardial infarction) (CMS/HCC)   • Essential hypertension   • Type II diabetes mellitus (CMS/HCC)   • Dementia   • Pulmonary nodules   • RBBB   • Chronic right basal ganglia lacunar infarct on previous head CT   • Advanced age   • Hiatal hernia   • GERD (gastroesophageal reflux disease)   • Shortness of breath   • Hypoxia   • Sepsis (CMS/HCC)      Medical History        Past Medical History:   Diagnosis Date   • Afib (CMS/HCC)     • Dementia     • Dementia     • Diabetes mellitus (CMS/HCC)     • Hypertension     • Pancreatitis     • Systolic CHF (CMS/HCC)           Surgical History         Past Surgical History:   Procedure Laterality Date   • ANKLE SURGERY       • HYSTERECTOMY       • SHOULDER SURGERY       • TYMPANOPLASTY             F/u this pm. Ms. Bryant is seen at bedside pm on 3N. Her daughter is present at bedside. Ms. Bryant is resting upon SLP entry, easily awakens to mod verbal/tactile stimuli. D/w pt and pt's daughter risks and benefits of instrumental MBS w/ verbal understanding and agreement.            Floridalma Bryant  presents to the radiology suite this pm from 3N 3347/1S to participate in an instrumental MBS to evaluate safety/efficacy of swallowing fnx, determine safest/least restrictive diet. She is familiar to Speech Therapy Department for h/o MBS 11/1/17 w/ deep laryngeal penetration of thin liquids.      Social History               Socioeconomic History   • Marital status:        Spouse name: Not on file   • Number of children: Not on file   • Years of education: Not on file   • Highest education level: Not on file   Social Needs   • Financial resource strain: Not on file   • Food insecurity - worry: Not on file   • Food insecurity - inability: Not on  file   • Transportation needs - medical: Not on file   • Transportation needs - non-medical: Not on file   Occupational History   • Not on file   Tobacco Use   • Smoking status: Never Smoker   • Smokeless tobacco: Never Used   Substance and Sexual Activity   • Alcohol use: No   • Drug use: No   • Sexual activity: Defer   Other Topics Concern   • Not on file   Social History Narrative   • Not on file         Chest CT w/o contrast 2/6/19 reveals stable parenchymal nodules bilaterally, pericardial effusion, L basilar consolidation per radiologist.             Diet Orders (active) (From admission, onward)     Start     Ordered     02/06/19 1955   NPO Diet NPO Except: Sips With Meds  Diet Effective Now      02/06/19 1954          Pt is observed on 2L O2 via NC w/o complications.      Risks and benefits of the procedure are explained w/ pt verbalizing understanding/agreement to participate.      Pt is positioned upright and centered in a soft strap supportive chair to accept multiple po presentations of solid cracker, puree, honey thick, nectar thick, and thin liquids via spoon, cup and straw, along w/ whole placebo pill in puree. She does not self feed.      All views are from the lateral plane.      Facial/oral structures are symmetrical upon observation w/o lingual deviation upon protrusion. Oral mucosa are moist, pink and clean. Secretions are clear, thin and well controlled. OROM/KALEY is mildly weak and delayed to imitate oral postures. Gag is not assessed. Volitional cough is adequate in intensity, congestive in quality, nonproductive. Vocal quality is adequate in intensity, clear in quality w/ intelligible speech. She evidences w/ minimal verbalizations, however, she does respond to simple y/n and oe questions/statements w/ 1-3 word responses w/ SLP initiation. She is a/a and cooperative to particpate. She is oriented to person, follows simple directives intermittently, pleasantly confused. Noted premorbid baseline  dementia.       Upon po presentations, adequate bolus anticipation w/ good labial seal for bolus clearance via spoon bowl, cup rim stability and suction via straw. Bolus formation, manipulation, and control are generally weak w/ moderately increased oral prep time w/ solids, mixed phasic/rotary mastication pattern. A-p transit is slightly delayed w/o significant oral residue. Tongue base retraction and linguavelar seal are generally weak w/ premature spillage of honey thick, nectar thick and thin liquids before the swallow, controlled in the bilateral pyriform sinuses. No laryngeal penetration or aspiration is evidenced before the swallow.      Pharyngeal swallow is timely w/ slightly weak hyolaryngeal elevation and epiglottic inversion. Pharyngeal contraction is slightly weak w/ trace, diffuse pharyngeal residue w/ all consistencies. Deep laryngeal penetration occurs during the swallow w/ thin liquid via all presentation styles w/ intermittent silent aspiration during the swallow via cup and straws. Cued cough effective to clear. No other laryngeal penetration or aspiration evidenced during or after the swallow.      Pt masticates whole placebo pill in puree. Transits and swallows w/o difficulty.      Full esophageal sweep reveals no obvious mucosal abnormalities. Motility appears adequate w/o retrograde flow noted.      Impression: Per this evaluation, Ms. Bryant presents w/ a mild oropharyngeal dysphagia w/ intermittent silent aspiration of thin liquids. She is felt to most benefit from a modified po diet w/ nectar thick liquids. She will require 1:1 feeding assistance.      EDUCATION  The patient has been educated in the following areas:   Dysphagia (Swallowing Impairment).     SLP Recommendation and Plan  1. Mechanical soft consistency, ground meats, NECTAR thick liquids.   2. Meds whole in puree.  3. 1:1 feeds.    4. Universal aspiration precautions.   5. FELY precautions.   6. Oral care protocol.   7. Upright  and centered for all po intake.   8. Fully a/a for all po intake.   SLP to f/u for diet tolerance/trial dysphagia tx pending pt status to functionally participate.      D/w RN, Luma, results and recommendations via telephone w/ verbal understanding and agreement.      Thank you for allowing me to participate in the care of your patient-  Janis Bhat M.A., CCC-SLP     Plan of Care Reviewed With: patient  Plan of Care Review  Plan of Care Reviewed With: patient  Progress: improving     Time Calculation:             Time Calculation- SLP                Row Name 02/07/19 1546 02/07/19 0852                     Time Calculation- SLP     SLP - Next Appointment  02/08/19  -CM  02/07/19  -CM                       User Key  (r) = Recorded By, (t) = Taken By, (c) = Cosigned By     Initials Name Provider Type     Janis Miller MA,CCC-SLP Speech Therapist                      Therapy Charges for Today      Code Description Service Date Service Provider Modifiers Qty     04956863607 HC ST MOTION FLUORO EVAL SWALLOW 8 2/7/2019 Janis Bhat MA,CCC-SLP GN 1                Janis Bhat MA,CCC-SLP             2/7/2019                  Jaycob Coy DO   Physician   Medicine   Progress Notes   Signed   Date of Service:  2/7/2019  3:47 PM               Signed        Expand All Collapse All           Show:Clear all  [x]Manual[x]Template[]Copied    Added by:  [x]Jaycob Coy DO      []Omayra for details         Subjective         History:   Floridalma Bryant is a 84 y.o. female admitted on 2/6/2019 secondary to <principal problem not specified>      Procedures: None     CC: Follow up pneumonia     Patient seen and examined with ANNIKA Ge. Sleeping upon my arrival but easily awakens. Family present at bedside. Pt is oriented to person only and unable to provide any history. She does report some cough and dyspnea. Family and RN state patient has slept much of the day but her  daughter states she had not been sleeping much at home. No acute events overnight per RN.      History taken from: patient's family, chart, and RN.             Objective         Vital Signs  Temp:  [98.1 °F (36.7 °C)-98.7 °F (37.1 °C)] 98.7 °F (37.1 °C)  Heart Rate:  [70-88] 70  Resp:  [18] 18  BP: ()/(48-63) 113/48     Intake/Output Summary (Last 24 hours) at 2/7/2019 1547  Last data filed at 2/7/2019 0300      Gross per 24 hour   Intake 0 ml   Output 70 ml   Net -70 ml            Physical Exam:  General:    Awake, alert but confused, in no acute distress, chronically ill appearing, hard of hearing    Heart:      Normal S1 and S2. Regular rate and rhythm. No significant murmur, rubs or gallops appreciated.   Lungs:     Respirations regular, even and unlabored. Diminished breath sounds throughout with bibasilar crackles. No wheezes.    Abdomen:   Soft and nontender. No guarding, rebound tenderness or  organomegaly noted. Bowel sounds present x 4.   Extremities:  No clubbing, cyanosis or edema noted. Moves UE and LE equally B/L.      Results Review:           Results from last 7 days   Lab Units 02/07/19 0411 02/06/19 1933   WBC 10*3/mm3 9.64 14.56*   HEMOGLOBIN g/dL 11.8* 12.6   PLATELETS 10*3/mm3 227 241            Results from last 7 days   Lab Units 02/07/19  0411 02/06/19 1933   SODIUM mmol/L 140 140   POTASSIUM mmol/L 3.5 3.6   CHLORIDE mmol/L 108 104   CO2 mmol/L 25.2 26.4   BUN mg/dL 13 14   CREATININE mg/dL 0.58 0.73   CALCIUM mg/dL 8.4 8.8   GLUCOSE mg/dL 125* 140*            Results from last 7 days   Lab Units 02/07/19 0411 02/06/19 1933   BILIRUBIN mg/dL 0.5 0.7   ALK PHOS U/L 52 62   AST (SGOT) U/L 9* 9*   ALT (SGPT) U/L 5* 8*           Results from last 7 days   Lab Units 02/06/19 1933   MAGNESIUM mg/dL 1.5*               Results from last 7 days   Lab Units 02/06/19  1933   TROPONIN I ng/mL 0.008                 Imaging Results (last 24 hours)      Procedure Component Value Units Date/Time      FL Video Swallow [[id number]] Collected:  02/07/19 1426       Updated:  02/07/19 1444     Narrative:        FL VIDEO SWALLOW-     CLINICAL INDICATION: aspiration          TECHNIQUE:   Speech therapy service was present. The patient was examined in the  sitting lateral position and was given several consistencies of barium.     FINDINGS: Silent aspiration with thin liquids     FLUOROSCOPY TIME: 1.5 minutes     Images: Cine loop was acquired        Impression:        Aspiration with thin liquids     For additional information please see the report provided by the speech  therapy service     This report was finalized on 2/7/2019 2:41 PM by Dr. Daryl Rosas MD.        XR Chest 1 View [[id number]] Collected:  02/07/19 0719       Updated:  02/07/19 1055     Narrative:        XR CHEST 1 VIEW-     CLINICAL INDICATION: Pneumonia.          COMPARISON: 05/27/2018      TECHNIQUE: Single frontal view of the chest.     FINDINGS:     Diffuse interstitial changes.  The cardiac silhouette is normal. The pulmonary vasculature is  unremarkable.  There is no evidence of an acute osseous abnormality.   There are no suspicious-appearing parenchymal soft tissue nodules.             Impression:        No evidence of active or acute cardiopulmonary disease on today's chest  radiograph.         This report was finalized on 2/7/2019 10:53 AM by Dr. Daryl Rosas MD.        CT Head Without Contrast [[id number]] Collected:  02/07/19 0719       Updated:  02/07/19 1007     Narrative:        CT HEAD WITHOUT CONTRAST-     CLINICAL INDICATION: Decreased alertness          COMPARISON: 04/16/2018      TECHNIQUE: Axial images of the brain were obtained without intravenous  contrast.  Reformatted images were created in the sagittal and coronal  planes.     DOSE: 1095.9 mGy.cm     Radiation dose reduction techniques were utilized per ALARA protocol.  Automated exposure control was initiated through either or CareDose or  DoseRight software packages by   protocol.           FINDINGS:   Today's study shows no mass, hemorrhage, or midline shift.   The ventricles, cisterns, and sulci are unremarkable. There is no  hydrocephalus.   There is no evidence of acute ischemia.  I do not see epidural or subdural hematoma.  The gray-white differentiation is appropriate.   The bone window setting images show no destructive calvarial lesion or  acute calvarial fracture.   The posterior fossa is unremarkable.  Opacification of the left mastoid air cells.         Impression:        1. Opacification of the left mastoid air cells.  2. No parenchymal mass, hemorrhage, or midline shift.     This report was finalized on 2/7/2019 10:05 AM by Dr. Daryl Rosas MD.        CT Chest Without Contrast [[id number]] Collected:  02/07/19 0718       Updated:  02/07/19 1003     Narrative:        EXAMINATION: CT CHEST WITHOUT CONTRAST-      CLINICAL INDICATION: Shortness of breath          DOSE: 497.786914 mGy.cm  COMPARISON: 01/05/2018     Radiation dose reduction techniques were utilized per ALARA protocol.  Automated exposure control was initiated through either or CareDoStorify or  DoseRight software packages by  protocol.           PROCEDURE: Axial images were acquired from the thoracic inlet through  the upper abdomen without any IV contrast. Reformatted images were  created.     FINDINGS: Today's study shows small pericardial effusion.     Consolidation in the left lower lobe probably representing pneumonia.  There is an 8 mm nodule in the right lung on image 34 of the axial  series. This is stable. Other parenchymal nodules present bilaterally  also very similar to the previous exam.     There are coronary artery calcifications.        Impression:        1. Parenchymal nodules bilaterally are stable.  2. Pericardial effusion is new.  3. Left basilar consolidation.  4. Upper abdomen demonstrating cholelithiasis.      This report was finalized on 2/7/2019 10:01 AM by   Daryl Rosas MD.                   Medications:     ceftriaxone 1 g Intravenous Q24H   citalopram 20 mg Oral Daily   clopidogrel 75 mg Oral Daily   doxycycline 100 mg Intravenous Q12H   ferrous sulfate 325 mg Oral Daily With Breakfast   I-chelo 1 tablet Oral Daily   insulin aspart 0-7 Units Subcutaneous 4x Daily AC & at Bedtime   lactobacillus acidophilus 1 capsule Oral Daily   levothyroxine 50 mcg Oral Daily   magnesium sulfate 2 g Intravenous Once   metroNIDAZOLE 500 mg Intravenous Q8H   pantoprazole 40 mg Oral QAM   potassium chloride 40 mEq Oral Q4H   rivaroxaban 15 mg Oral Daily With Dinner   rivastigmine 1 patch Transdermal Daily   sodium chloride 3 mL Intravenous Q12H         sodium chloride 75 mL/hr Last Rate: 75 mL/hr (02/06/19 2208)                    Assessment/Plan      Severe sepsis with metabolic encephalopathy: Likely 2/2 bibasilar pneumonia. Febrile at outside clinic but afebrile since upon admission. Leukocytosis resolved today. CRP elevated. Blood cultures with NGTD. Cont Rocephin, Doxycycline and Flagyl. Cont to follow cultures and repeat labs in the AM.     Bibasilar pneumonia: Community acquired with probable aspiration component as swallow eval reveals intermittent silent aspiration with thin liquids. Atypical workup is negative. Cont antibiotics as above.      Metabolic encephalopathy superimposed on baseline dementia: CT head negative for any acute intracranial abnormalities. Likely 2/2 above. Cont treatment of pneumonia. Cont supportive treatment with sitter present at bedside.      Recurrent UTI's: UA this admission not suggestive of UTI.     Borderline hypokalemia: Start electrolyte replacement protocols.      Pericardial effusion: CT chest reveals pericardial effusion. Echo ordered and pending.      PAF: Currently in sinus rhythm. Cont Xarelto for stroke prevention.      Chronic systolic CHF: Appears compensated at present. Repeat echo pending. Monitor I/O's.      DM II, insulin  dependent: HgbA1c 6.6. BG levels currently stable. Cont SSI with Accuchecks.      Essential HTN: BP low upon admission but improved today. Holding home antihypertensives. Will restart if BP begins trending upward.      DVT PPX: Xarelto     Pt is at high risk 2/2 severe sepsis, pneumonia, metabolic encephalopathy superimposed on baseline dementia, PAF, CHF and advanced age.         Jaycob Coy DO  02/07/19  3:47 PM                       Scheduled Meds Sorted by Name   for Floridalma Bryant as of 2/2/19 through 2/8/19     1 Day 3 Days 7 Days 10 Days < Today >    Legend:                           Inactive     Active     Other Encounter    Linked               Medications 02/02/19 02/03/19 02/04/19 02/05/19 02/06/19 02/07/19 02/08/19   cefTRIAXone (ROCEPHIN) 1 g/100 mL 0.9% NS (MBP)   Dose: 1 g  Freq: Every 24 Hours Route: IV  Indications of Use: PNEUMONIA  Indications Comment: CAP  Start: 02/06/19 2100 End: 02/13/19 2059    Admin Instructions:   Caution: Look alike/sound alike drug alert. Activate vial before using.        2208 2047 2100        citalopram (CeleXA) tablet 20 mg   Dose: 20 mg  Freq: Daily Route: PO  Start: 02/07/19 0900    Admin Instructions:   Caution: Look alike/sound alike drug alert.         1609      0941        clopidogrel (PLAVIX) tablet 75 mg   Dose: 75 mg  Freq: Daily Route: PO  Start: 02/07/19 0900         1609      0941        doxycycline (VIBRAMYCIN) 100 mg/100 mL 0.9% NS MBP   Dose: 100 mg  Freq: Every 12 Hours Route: IV  Indications of Use: PNEUMONIA  Indications Comment: CAP  Start: 02/06/19 2300 End: 02/13/19 2259    Admin Instructions:   Protect from light        3843      1218     2331      1151     2300        ferrous sulfate tablet 325 mg   Dose: 325 mg  Freq: Daily With Breakfast Route: PO  Start: 02/07/19 0800    Admin Instructions:   Swallow whole. Do not crush, split, or chew. Take with food if GI upset occurs.         1610      0954        guaiFENesin  (MUCINEX) 12 hr tablet 1,200 mg   Dose: 1,200 mg  Freq: Every 12 Hours Scheduled Route: PO  Start: 02/07/19 2100    Admin Instructions:   Caution: Look alike/sound alike drug alert               Do not crush, split, or chew.         2047 0941 2100        I-chelo tablet 1 tablet   Dose: 1 tablet  Freq: Daily Route: PO  Start: 02/07/19 0900         1608      0941        insulin aspart (novoLOG) injection 0-7 Units   Dose: 0-7 Units  Freq: 4 Times Daily Before Meals & Nightly Route: SC  Start: 02/06/19 2100    Admin Instructions:   Correction - Low Dose.  Less than 40 units/day total insulin dose or lean, elderly, renal patients    Blood glucose 150-199 mg/dL - 2 units  Blood glucose 200-249 mg/dL - 3 units  Blood glucose 250-299 mg/dL - 4 units  Blood glucose 300-349 mg/dL - 5 units  Blood glucose 350-400 mg/dL - 6 units  Blood glucose greater than 400 mg/dL - 7 units and call provider          (2202) [C]      (7665) [phone]     2049      (3042) (1941)     1733     2100        lactobacillus acidophilus (RISAQUAD) capsule 1 capsule   Dose: 1 capsule  Freq: Daily Route: PO  Start: 02/07/19 1800         1730      0941        levothyroxine (SYNTHROID, LEVOTHROID) tablet 50 mcg   Dose: 50 mcg  Freq: Daily Route: PO  Start: 02/07/19 0900    Admin Instructions:   Take on empty stomach.         1610      0941        magnesium sulfate 2g/50 mL (PREMIX) infusion   Dose: 2 g  Freq: Once Route: IV  Start: 02/07/19 1400 End: 02/07/19 1800    Admin Instructions:   Magnesium replacement per lab value 1.5         1600         Magnesium Sulfate 4 gram infusion- Mg 1.6-1.9 mg/dL   Dose: 4 g  Freq: Once Route: IV  Last Dose: 4 g (02/08/19 0501)  Start: 02/08/19 0530 End: 02/08/19 0901    Admin Instructions:   Mg 1.6-1.9 mg/dL. Recheck Mg level in the AM.          0501        metroNIDAZOLE (FLAGYL) IVPB 500 mg   Dose: 500 mg  Freq: Every 8 Hours Route: IV  Indications of Use: PNEUMONIA  Indications Comment: CAP  with possible aspiration  Start: 02/06/19 2200 End: 02/13/19 2159    Admin Instructions:   Do not refrigerate.        2208      0457     0600     1451     2200      0500     1400     2200        pantoprazole (PROTONIX) EC tablet 40 mg   Dose: 40 mg  Freq: Every Morning Route: PO  Start: 02/07/19 0700    Admin Instructions:   Swallow whole; do not crush, split, or chew.         1609      0631        potassium chloride (K-DUR,KLOR-CON) CR tablet 40 mEq   Dose: 40 mEq  Freq: Every 4 Hours Route: PO  Start: 02/07/19 1400 End: 02/07/19 2046    Admin Instructions:   Potassium replacement per lab value 3.5         1730 2046         rivaroxaban (XARELTO) tablet 15 mg   Dose: 15 mg  Freq: Daily With Dinner Route: PO  Indications of Use: Other - full anticoagulation  Start: 02/07/19 1800    Admin Instructions:   If giving by tube: suspend in 50mL water, administer, and immediately follow with enteral feeding.  Give with food.         1731 1800        rivastigmine (EXELON) 9.5 MG/24HR patch 1 patch   Dose: 1 patch  Freq: Daily Route: TD  Start: 02/07/19 0900    Admin Instructions:   Apply patch to clean, dry, intact hairless skin on upper or lower back, upper arm or chest. Do not apply to same site more than once every 14 days.         1205 [C]      0941     0943 [C]        sodium chloride 0.9 % flush 3 mL   Dose: 3 mL  Freq: Every 12 Hours Scheduled Route: IV  Start: 02/06/19 2100 2202      (0911) [C]     2049      (9142) [C]     2100        Medications 02/02/19 02/03/19 02/04/19 02/05/19 02/06/19 02/07/19 02/08/19       Continuous Meds Sorted by Name   for Floridalma Bryant as of 2/2/19 through 2/8/19    Legend:         Inactive     Active     Other Encounter    Linked               Medications 02/02/19 02/03/19 02/04/19 02/05/19 02/06/19 02/07/19 02/08/19   Pharmacy Consult - Pharmacy to dose   Freq: Continuous PRN Route: XX  PRN Reason: Consult  Start: 02/06/19 1951 End: 02/06/19 2003    Order specific  questions:   Pharmacy to Dose: Flagyl  Indication of Use CAP with possible aspiration.        2003-D/C'd        Pharmacy Consult - Pharmacy to dose   Freq: Continuous PRN Route: XX  PRN Reason: Consult  Start: 02/06/19 1949 End: 02/06/19 2003    Order specific questions:   Pharmacy to Dose: Doxycycline  Indication of Use CAP.        2003-D/C'd        Pharmacy Consult - Pharmacy to dose   Freq: Continuous PRN Route: XX  PRN Reason: Consult  Start: 02/06/19 1949 End: 02/06/19 2003    Order specific questions:   Pharmacy to Dose: Rocephin  Indication of Use CAP.        2003-D/C'd        sodium chloride 0.9 % infusion   Rate: 75 mL/hr Dose: 75 mL/hr  Freq: Continuous Route: IV  Last Dose: 75 mL/hr (02/08/19 0940)  Start: 02/06/19 2045        2208     2329      1645      0940            PRN Meds Sorted by Name   for Floridalma Bryant as of 2/2/19 through 2/8/19    Legend:         Inactive     Active     Other Encounter    Linked               Medications 02/02/19 02/03/19 02/04/19 02/05/19 02/06/19 02/07/19 02/08/19   dextrose (D50W) 25 g/ 50mL Intravenous Solution 25 g   Dose: 25 g  Freq: Every 15 Minutes PRN Route: IV  PRN Reason: Low Blood Sugar  PRN Comment: Blood Sugar Less Than 70  Start: 02/06/19 2000    Admin Instructions:   Blood sugar less than 70; patient has IV access - Unresponsive, NPO or Unable To Safely Swallow             dextrose (GLUTOSE) oral gel 15 g   Dose: 15 g  Freq: Every 15 Minutes PRN Route: PO  PRN Reason: Low Blood Sugar  PRN Comment: Blood sugar less than 70  Start: 02/06/19 2000    Admin Instructions:   BS<70, Patient Alert, Is not NPO, Can safely swallow.             docusate sodium (COLACE) capsule 200 mg   Dose: 200 mg  Freq: Daily PRN Route: PO  PRN Reason: Constipation  Start: 02/06/19 2100    Admin Instructions:   Swallow whole. Do not open, crush, or chew capsule.             glucagon (human recombinant) (GLUCAGEN DIAGNOSTIC) injection 1 mg   Dose: 1 mg  Freq: As Needed Route: SC  PRN  Comment: Blood Glucose Less Than 70  Start: 02/06/19 2000    Admin Instructions:   Blood Glucose Less Than 70 - Patient Without IV Access - Unresponsive, NPO or Unable To Safely Swallow             hydrALAZINE (APRESOLINE) injection 10 mg   Dose: 10 mg  Freq: Every 6 Hours PRN Route: IV  PRN Reason: High Blood Pressure  Start: 02/06/19 1955    Admin Instructions:   As needed for SBP greater than 170 or DBP>100  Caution: Look alike/sound alike drug alert             Magnesium Sulfate 2 gram Bolus, followed by 8 gram infusion (total Mg dose 10 grams)- Mg less than or equal to 1mg/dL   Dose: 2 g  Freq: As Needed Route: IV  PRN Comment: See Administration Instructions  Start: 02/07/19 1300    Admin Instructions:   Mg less than or equal to 1mg/dL. Give 2 gm over 30 minutes as bolus, then infuse 2 gm over 2 hours for 4 doses (8 grams) for total dose of 10 grams.  Recheck Mg levels in the AM.         (1600)     (1623)         Or  Magnesium Sulfate 2 gram / 50mL Infusion (GIVE X 3 BAGS TO EQUAL 6GM TOTAL DOSE) - Mg 1.1 - 1.5 mg/dl   Dose: 2 g  Freq: As Needed Route: IV  PRN Comment: See Administration Instructions  Start: 02/07/19 1300    Admin Instructions:   Mg 1.1 -1.5 mg/dL. Infuse 2 grams over 2 hours for 3 doses (for a total Mg dose of 6 grams).  Recheck Mg level in the AM.         1600-Not Given:  See Alt     1623-Not Given:  See Alt         Or  Magnesium Sulfate 4 gram infusion- Mg 1.6-1.9 mg/dL   Dose: 4 g  Freq: As Needed Route: IV  PRN Comment: See Administration Instructions  Start: 02/07/19 1300    Admin Instructions:   Mg 1.6-1.9 mg/dL. Recheck Mg level in the AM.         1600-Not Given:  See Alt     1623-Not Given:  See Alt         nitroglycerin (NITROSTAT) SL tablet 0.4 mg   Dose: 0.4 mg  Freq: Every 5 Minutes PRN Route: SL  PRN Reason: Chest Pain  PRN Comment: if systolic BP greater than 100 mm/Hg.  Start: 02/06/19 1843    Admin Instructions:   If pain unrelieved after 3 doses, notify MD.              Pharmacy Consult - Pharmacy to dose   Freq: Continuous PRN Route: XX  PRN Reason: Consult  Start: 02/06/19 1951 End: 02/06/19 2003    Order specific questions:   Pharmacy to Dose: Flagyl  Indication of Use CAP with possible aspiration.        2003-D/C'd        Pharmacy Consult - Pharmacy to dose   Freq: Continuous PRN Route: XX  PRN Reason: Consult  Start: 02/06/19 1949 End: 02/06/19 2003    Order specific questions:   Pharmacy to Dose: Doxycycline  Indication of Use CAP.        2003-D/C'd        Pharmacy Consult - Pharmacy to dose   Freq: Continuous PRN Route: XX  PRN Reason: Consult  Start: 02/06/19 1949 End: 02/06/19 2003    Order specific questions:   Pharmacy to Dose: Rocephin  Indication of Use CAP.        2003-D/C'd        potassium chloride (K-DUR,KLOR-CON) ER tablet 40 mEq   Dose: 40 mEq  Freq: As Needed Route: PO  PRN Comment: potassium replacement.  see admin instructions  Start: 02/07/19 1300    Admin Instructions:   Potassium replacement    Oral (may give capsule or powder packet)  If K+ less than or equal to 3.1 give KCl 40 mEq q4h x 3 doses  If K+ 3.2-3.6 give KCl 40 mEq q4h x 2 doses    Peripheral IV  If K+ less than or equal to 3.1 give KCl 10 mEq/100 mL NS IV q1h x 6 doses  If K+ 3.2-3.6 give KCl 10 mEq/100 mL NS q1h x 4 doses    Central Line  If K+ less than or equal to 3.1 give KCl 20 mEq/50 mL NS IV q1h x 3 doses  If K+ 3.2-3.6 give KCl 20 mEq/50 mL NS q1h x 2 doses    Check potassium 4 hours after last dose given.  Check magnesium if K stays low after replacement.  DO NOT GIVE if CrCl is less than 30 mL/minute or urine output is less than 30 mL/hr  Swallow whole; do not crush, split, or chew.             Or  potassium chloride (KLOR-CON) packet 40 mEq   Dose: 40 mEq  Freq: As Needed Route: PO  PRN Comment: potassium replacement, see admin instructions  Start: 02/07/19 1300    Admin Instructions:   Potassium replacement    Oral (may give capsule or powder packet)  If K+ less than or equal to  3.1 give KCl 40 mEq q4h x 3 doses  If K+ 3.2-3.6 give KCl 40 mEq q4h x 2 doses    Peripheral IV  If K+ less than or equal to 3.1 give KCl 10 mEq/100 mL NS IV q1h x 6 doses  If K+ 3.2-3.6 give KCl 10 mEq/100 mL NS q1h x 4 doses    Central Line  If K+ less than or equal to 3.1 give KCl 20 mEq/50 mL NS IV q1h x 3 doses  If K+ 3.2-3.6 give KCl 20 mEq/50 mL NS q1h x 2 doses    Check potassium 4 hours after last dose given.  Check magnesium if K stays low after replacement.  DO NOT GIVE if CrCl is less than 30 mL/minute or urine output is less than 30 mL/hr             Or  potassium chloride 10 mEq in 100 mL IVPB   Dose: 10 mEq  Freq: Every 1 Hour PRN Route: IV  PRN Comment: potassium protocol PERIPHERAL - see admin instructions  Start: 02/07/19 1300    Admin Instructions:   Potassium replacement - patient NPO or cannot tolerate oral potassium    Peripheral or Central IV  If K+ less than or equal to 3.1 give KCl 10 mEq/100 mL NS IV q1h x 6 doses  If K+ 3.2-3.6 give KCl 10 mEq/100 mL NS q1h x 4 doses    Check potassium 4 hours after last dose given.  Check magnesium if K stays low after replacement.  DO NOT GIVE if CrCl is less than 30 mL/minute or urine output is less than 30 mL/hr. Rates greater than 10mEq/hr require ECG monitoring.             sodium chloride 0.9 % flush 1-10 mL   Dose: 1-10 mL  Freq: As Needed Route: IV  PRN Reason: Line Care  Start: 02/06/19 1843             Medications 02/02/19 02/03/19 02/04/19 02/05/19 02/06/19 02/07/19 02/08/19            CT Chest Without Contrast [[id number]]Real as Reviewed Order Status: CompletedCollected: 02/07/19 0718 Updated: 02/07/19 1003 Narrative:   EXAMINATION: CT CHEST WITHOUT CONTRAST-      CLINICAL INDICATION: Shortness of breath          DOSE: 497.075760 mGy.cm  COMPARISON: 01/05/2018     Radiation dose reduction techniques were utilized per ALARA protocol.  Automated exposure control was initiated through either or CareDose or  DoseRight software packages by   protocol.           PROCEDURE: Axial images were acquired from the thoracic inlet through  the upper abdomen without any IV contrast. Reformatted images were  created.     FINDINGS: Today's study shows small pericardial effusion.     Consolidation in the left lower lobe probably representing pneumonia.  There is an 8 mm nodule in the right lung on image 34 of the axial  series. This is stable. Other parenchymal nodules present bilaterally  also very similar to the previous exam.     There are coronary artery calcifications.    Impression:   1. Parenchymal nodules bilaterally are stable.  2. Pericardial effusion is new.  3. Left basilar consolidation.  4. Upper abdomen demonstrating cholelithiasis.      This report was finalized on 2/7/2019 10:01 AM by Dr. Daryl Rosas MD.   Radiology Results (last 21 days)     Procedure Component Value Units Date/Time   FL Video Swallow [[id number]] Real as Reviewed   Order Status: Completed Collected: 02/07/19 1426    Updated: 02/07/19 1444   Narrative:     FL VIDEO SWALLOW-     CLINICAL INDICATION: aspiration          TECHNIQUE:   Speech therapy service was present. The patient was examined in the  sitting lateral position and was given several consistencies of barium.     FINDINGS: Silent aspiration with thin liquids     FLUOROSCOPY TIME: 1.5 minutes     Images: Cine loop was acquired      Impression:     Aspiration with thin liquids     For additional information please see the report provided by the speech  therapy service     This report was finalized on 2/7/2019 2:41 PM by Dr. Daryl Rosas MD.

## 2019-02-08 NOTE — PROGRESS NOTES
Antimicrobial Length of Therapy:    Day 3 Rocephin  Day 3 Doxycycline  Day 3 Flagyl    Thank you,    Megan Johnson [facility]

## 2019-02-08 NOTE — PLAN OF CARE
Problem: Patient Care Overview  Goal: Plan of Care Review  Outcome: Ongoing (interventions implemented as appropriate)   02/07/19 1545 02/07/19 2100   Coping/Psychosocial   Plan of Care Reviewed With --  patient   Plan of Care Review   Progress improving --      Goal: Individualization and Mutuality  Outcome: Ongoing (interventions implemented as appropriate)    Goal: Discharge Needs Assessment  Outcome: Ongoing (interventions implemented as appropriate)    Goal: Interprofessional Rounds/Family Conf  Outcome: Ongoing (interventions implemented as appropriate)      Problem: Skin Injury Risk (Adult)  Goal: Identify Related Risk Factors and Signs and Symptoms  Outcome: Ongoing (interventions implemented as appropriate)   02/06/19 2342   Skin Injury Risk (Adult)   Related Risk Factors (Skin Injury Risk) advanced age;fluid intake inadequate;infection;medication;mobility impaired;nutritional deficiencies;tissue perfusion altered     Goal: Skin Health and Integrity  Outcome: Ongoing (interventions implemented as appropriate)      Problem: Fall Risk (Adult)  Goal: Identify Related Risk Factors and Signs and Symptoms  Outcome: Ongoing (interventions implemented as appropriate)   02/06/19 2342   Fall Risk (Adult)   Related Risk Factors (Fall Risk) age-related changes;confusion/agitation;fatigue/slow reaction;gait/mobility problems;history of falls;impaired vision;objects hard to reach;environment unfamiliar;slippery/uneven surfaces   Signs and Symptoms (Fall Risk) presence of risk factors     Goal: Absence of Fall  Outcome: Ongoing (interventions implemented as appropriate)      Problem: Activity Intolerance (Adult)  Goal: Identify Related Risk Factors and Signs and Symptoms  Outcome: Ongoing (interventions implemented as appropriate)   02/06/19 2342 02/08/19 0103   Activity Intolerance (Adult)   Related Risk Factors (Activity Intolerance) generalized weakness;O2 supply/demand imbalance --    Signs and Symptoms (Activity  Intolerance) --  unable to complete BADL/IADL     Goal: Activity Tolerance  Outcome: Ongoing (interventions implemented as appropriate)    Goal: Effective Energy Conservation Techniques  Outcome: Ongoing (interventions implemented as appropriate)

## 2019-02-08 NOTE — PROGRESS NOTES
Subjective     History:   Floridalma Bryant is a 84 y.o. female admitted on 2/6/2019 secondary to <principal problem not specified>     Procedures: None    CC: Follow up pneumonia    Patient seen and examined with ANNIKA Ge. Awake and alert. Much more awake and alert today. Oriented to person and knows she's in the hospital. States she wants to go home. Reports cough. Family states she does not like the thickened liquids. RN reports some intermittent agitation but overall stable. No acute events overnight per RN.     History taken from: patient's family, chart, and RN.      Objective     Vital Signs  Temp:  [98 °F (36.7 °C)-98.8 °F (37.1 °C)] 98.8 °F (37.1 °C)  Heart Rate:  [71-80] 71  Resp:  [18] 18  BP: (108-147)/(50-87) 144/56    Intake/Output Summary (Last 24 hours) at 2/8/2019 1733  Last data filed at 2/8/2019 1300  Gross per 24 hour   Intake 760 ml   Output 900 ml   Net -140 ml         Physical Exam:  General:    Awake, alert but confused, in no acute distress, chronically ill appearing, hard of hearing    Heart:      Normal S1 and S2. Regular rate and rhythm. No significant murmur, rubs or gallops appreciated.   Lungs:     Respirations regular, even and unlabored. Bibasilar crackles. No wheezes.     Abdomen:   Soft and nontender. No guarding, rebound tenderness or  organomegaly noted. Bowel sounds present x 4.   Extremities:  No clubbing, cyanosis or edema noted. Moves UE and LE equally B/L.     Results Review:    Results from last 7 days   Lab Units 02/08/19  0541 02/07/19  0411 02/06/19 1933   WBC 10*3/mm3 7.86 9.64 14.56*   HEMOGLOBIN g/dL 10.5* 11.8* 12.6   PLATELETS 10*3/mm3 191 227 241     Results from last 7 days   Lab Units 02/08/19  0529 02/07/19  0411 02/06/19 1933   SODIUM mmol/L 150 140 140   POTASSIUM mmol/L 4.6 3.5 3.6   CHLORIDE mmol/L 121* 108 104   CO2 mmol/L 18.4* 25.2 26.4   BUN mg/dL 10 13 14   CREATININE mg/dL 0.53 0.58 0.73   CALCIUM mg/dL 7.9 8.4 8.8   GLUCOSE mg/dL 116* 125* 140*      Results from last 7 days   Lab Units 02/08/19  0529 02/07/19  0411 02/06/19  1933   BILIRUBIN mg/dL 0.4 0.5 0.7   ALK PHOS U/L 49 52 62   AST (SGOT) U/L 12 9* 9*   ALT (SGPT) U/L 5* 5* 8*     Results from last 7 days   Lab Units 02/08/19  0350 02/06/19  1933   MAGNESIUM mg/dL 1.7 1.5*         Results from last 7 days   Lab Units 02/06/19  1933   TROPONIN I ng/mL 0.008       Imaging Results (last 24 hours)     ** No results found for the last 24 hours. **            Medications:    ceftriaxone 1 g Intravenous Q24H   citalopram 20 mg Oral Daily   clopidogrel 75 mg Oral Daily   doxycycline 100 mg Intravenous Q12H   ferrous sulfate 325 mg Oral Daily With Breakfast   guaiFENesin 1,200 mg Oral Q12H   I-chelo 1 tablet Oral Daily   insulin aspart 0-7 Units Subcutaneous 4x Daily AC & at Bedtime   lactobacillus acidophilus 1 capsule Oral Daily   levothyroxine 50 mcg Oral Daily   metroNIDAZOLE 500 mg Intravenous Q8H   pantoprazole 40 mg Oral QAM   rivaroxaban 15 mg Oral Daily With Dinner   rivastigmine 1 patch Transdermal Daily   sodium chloride 3 mL Intravenous Q12H   sodium phosphate IVPB 15 mmol Intravenous Once              Assessment/Plan   Severe sepsis with metabolic encephalopathy: Likely 2/2 bibasilar pneumonia. Febrile at outside clinic but afebrile since upon admission. Leukocytosis resolved with stable WBC today. CRP improved today Blood cultures with NGTD. Cont Rocephin, Doxycycline and Flagyl. Cont to follow cultures and repeat labs in the AM.    Bibasilar pneumonia: Community acquired with probable aspiration component as swallow eval reveals intermittent silent aspiration with thin liquids. Atypical workup is negative. Cont antibiotics as above. Repeat CXR in the AM. Cont modified diet with thickened liquids per speech recs. Discussed risks of recurrent aspiration and pneumonia with allowing patient to eat as she wishes for comfort vs continuing modified dietwith pt's daughter today as pt does not like  current diet. She wishes to discuss with her siblings before deciding but she is leaning more allowing patient to eat for comfort.      Metabolic encephalopathy superimposed on baseline dementia: CT head negative for any acute intracranial abnormalities. Likely 2/2 above. Cont treatment of pneumonia. Cont supportive treatment with sitter present at bedside. Appears more alert today.     Recurrent UTI's: UA this admission not suggestive of UTI.    Borderline hypokalemia: Resolved today. Cont electrolyte replacement protocols.     Borderline hypernatremia: Stop NS. Repeat labs in the AM.     Pericardial effusion: CT chest reveals pericardial effusion. Echo reveals small pericardial effusion (<1cm), improved from previous.     PAF: Currently in sinus rhythm. Cont Xarelto for stroke prevention.     Chronic systolic CHF: Appears compensated at present. Repeat echo reveals an EF of 61-65%, diastolic dysfunction and a small pericardial effusion (<1cm) improved from previous.. Monitor I/O's.     DM II, insulin dependent: HgbA1c 6.6. BG levels currently stable. Cont SSI with Accuchecks.     Essential HTN: BP low upon admission but improved today. Holding home antihypertensives. Will restart if BP begins trending upward.     DVT PPX: Xarelto    Pt is at high risk 2/2 severe sepsis, pneumonia, metabolic encephalopathy superimposed on baseline dementia, PAF, CHF and advanced age.       Jaycob Coy,   02/08/19  5:33 PM

## 2019-02-08 NOTE — PROGRESS NOTES
Discharge Planning Assessment   Pedro     Patient Name: Floridalma Bryant  MRN: [id number]  Today's Date: 2/8/2019    Admit Date: 2/6/2019      Discharge Plan     Row Name 02/08/19 1533       Plan    Plan Pt lives at home with her spouse and she plans to return home at discharge. Pt utilizes [employer] [facility]. [employer] [facility] [phone] to be contacted at discharge. Pt has a cane, wheelchair, rolling walker, hospital bed, and shower chair from [facility]. Pt is requesting a bedside commode. BSC to be arranged with dr. jelani SIDDIQUI to follow.         Eileen Rolon

## 2019-02-09 ENCOUNTER — APPOINTMENT (OUTPATIENT)
Dept: GENERAL RADIOLOGY | Facility: HOSPITAL | Age: 84
End: 2019-02-09

## 2019-02-09 VITALS
RESPIRATION RATE: 18 BRPM | DIASTOLIC BLOOD PRESSURE: 65 MMHG | TEMPERATURE: 97.5 F | OXYGEN SATURATION: 92 % | HEART RATE: 83 BPM | SYSTOLIC BLOOD PRESSURE: 139 MMHG | HEIGHT: 56 IN | WEIGHT: 125.7 LBS | BODY MASS INDEX: 28.28 KG/M2

## 2019-02-09 LAB
ALBUMIN SERPL-MCNC: 3.3 G/DL (ref 3.4–4.8)
ALBUMIN/GLOB SERPL: 1.7 G/DL (ref 1.5–2.5)
ALP SERPL-CCNC: 51 U/L (ref 35–104)
ALT SERPL W P-5'-P-CCNC: 5 U/L (ref 10–36)
ANION GAP SERPL CALCULATED.3IONS-SCNC: 5.6 MMOL/L (ref 3.6–11.2)
AST SERPL-CCNC: 7 U/L (ref 10–30)
BASOPHILS # BLD AUTO: 0.02 10*3/MM3 (ref 0–0.3)
BASOPHILS NFR BLD AUTO: 0.2 % (ref 0–2)
BILIRUB SERPL-MCNC: 0.5 MG/DL (ref 0.2–1.8)
BUN BLD-MCNC: 5 MG/DL (ref 7–21)
BUN/CREAT SERPL: 9.6 (ref 7–25)
CALCIUM SPEC-SCNC: 8.2 MG/DL (ref 7.7–10)
CHLORIDE SERPL-SCNC: 110 MMOL/L (ref 99–112)
CO2 SERPL-SCNC: 25.4 MMOL/L (ref 24.3–31.9)
CREAT BLD-MCNC: 0.52 MG/DL (ref 0.43–1.29)
CRP SERPL-MCNC: 4.63 MG/DL (ref 0–0.99)
DEPRECATED RDW RBC AUTO: 52.7 FL (ref 37–54)
EOSINOPHIL # BLD AUTO: 0.04 10*3/MM3 (ref 0–0.7)
EOSINOPHIL NFR BLD AUTO: 0.4 % (ref 0–7)
ERYTHROCYTE [DISTWIDTH] IN BLOOD BY AUTOMATED COUNT: 16.1 % (ref 11.5–14.5)
GFR SERPL CREATININE-BSD FRML MDRD: 112 ML/MIN/1.73
GLOBULIN UR ELPH-MCNC: 2 GM/DL
GLUCOSE BLD-MCNC: 124 MG/DL (ref 70–110)
HCT VFR BLD AUTO: 36.4 % (ref 37–47)
HGB BLD-MCNC: 11.4 G/DL (ref 12–16)
IMM GRANULOCYTES # BLD AUTO: 0.03 10*3/MM3 (ref 0–0.03)
IMM GRANULOCYTES NFR BLD AUTO: 0.3 % (ref 0–0.5)
LYMPHOCYTES # BLD AUTO: 1.36 10*3/MM3 (ref 1–3)
LYMPHOCYTES NFR BLD AUTO: 14.2 % (ref 16–46)
MAGNESIUM SERPL-MCNC: 1.8 MG/DL (ref 1.7–2.6)
MCH RBC QN AUTO: 28.7 PG (ref 27–33)
MCHC RBC AUTO-ENTMCNC: 31.3 G/DL (ref 33–37)
MCV RBC AUTO: 91.7 FL (ref 80–94)
MONOCYTES # BLD AUTO: 1.1 10*3/MM3 (ref 0.1–0.9)
MONOCYTES NFR BLD AUTO: 11.5 % (ref 0–12)
NEUTROPHILS # BLD AUTO: 7.04 10*3/MM3 (ref 1.4–6.5)
NEUTROPHILS NFR BLD AUTO: 73.4 % (ref 40–75)
OSMOLALITY SERPL CALC.SUM OF ELEC: 279.9 MOSM/KG (ref 273–305)
PHOSPHATE SERPL-MCNC: 2.6 MG/DL (ref 2.7–4.5)
PLATELET # BLD AUTO: 226 10*3/MM3 (ref 130–400)
PMV BLD AUTO: 10.3 FL (ref 6–10)
POTASSIUM BLD-SCNC: 3.9 MMOL/L (ref 3.5–5.3)
PROT SERPL-MCNC: 5.3 G/DL (ref 6–8)
RBC # BLD AUTO: 3.97 10*6/MM3 (ref 4.2–5.4)
SODIUM BLD-SCNC: 141 MMOL/L (ref 135–153)
WBC NRBC COR # BLD: 9.59 10*3/MM3 (ref 4.5–12.5)

## 2019-02-09 PROCEDURE — 83735 ASSAY OF MAGNESIUM: CPT | Performed by: INTERNAL MEDICINE

## 2019-02-09 PROCEDURE — 94799 UNLISTED PULMONARY SVC/PX: CPT

## 2019-02-09 PROCEDURE — 80053 COMPREHEN METABOLIC PANEL: CPT | Performed by: INTERNAL MEDICINE

## 2019-02-09 PROCEDURE — 71045 X-RAY EXAM CHEST 1 VIEW: CPT

## 2019-02-09 PROCEDURE — 86140 C-REACTIVE PROTEIN: CPT | Performed by: INTERNAL MEDICINE

## 2019-02-09 PROCEDURE — 99239 HOSP IP/OBS DSCHRG MGMT >30: CPT | Performed by: PHYSICIAN ASSISTANT

## 2019-02-09 PROCEDURE — 87070 CULTURE OTHR SPECIMN AEROBIC: CPT | Performed by: PHYSICIAN ASSISTANT

## 2019-02-09 PROCEDURE — 87205 SMEAR GRAM STAIN: CPT | Performed by: PHYSICIAN ASSISTANT

## 2019-02-09 PROCEDURE — 85025 COMPLETE CBC W/AUTO DIFF WBC: CPT | Performed by: INTERNAL MEDICINE

## 2019-02-09 PROCEDURE — 71045 X-RAY EXAM CHEST 1 VIEW: CPT | Performed by: RADIOLOGY

## 2019-02-09 PROCEDURE — 84100 ASSAY OF PHOSPHORUS: CPT | Performed by: INTERNAL MEDICINE

## 2019-02-09 RX ORDER — BENZONATATE 200 MG/1
200 CAPSULE ORAL 3 TIMES DAILY PRN
Qty: 30 CAPSULE | Refills: 0 | Status: SHIPPED | OUTPATIENT
Start: 2019-02-09

## 2019-02-09 RX ORDER — METRONIDAZOLE 500 MG/1
500 TABLET ORAL 3 TIMES DAILY
Qty: 21 TABLET | Refills: 0 | Status: SHIPPED | OUTPATIENT
Start: 2019-02-09 | End: 2019-02-16

## 2019-02-09 RX ORDER — GUAIFENESIN/DEXTROMETHORPHAN 100-10MG/5
5 SYRUP ORAL EVERY 4 HOURS PRN
Qty: 236 ML | Refills: 0 | Status: SHIPPED | OUTPATIENT
Start: 2019-02-09

## 2019-02-09 RX ORDER — GUAIFENESIN/DEXTROMETHORPHAN 100-10MG/5
5 SYRUP ORAL EVERY 4 HOURS PRN
Status: DISCONTINUED | OUTPATIENT
Start: 2019-02-09 | End: 2019-02-09 | Stop reason: HOSPADM

## 2019-02-09 RX ORDER — MAGNESIUM SULFATE HEPTAHYDRATE 40 MG/ML
4 INJECTION, SOLUTION INTRAVENOUS ONCE
Status: COMPLETED | OUTPATIENT
Start: 2019-02-09 | End: 2019-02-09

## 2019-02-09 RX ORDER — L.ACID,PARA/B.BIFIDUM/S.THERM 8B CELL
1 CAPSULE ORAL DAILY
Qty: 7 CAPSULE | Refills: 0 | Status: SHIPPED | OUTPATIENT
Start: 2019-02-10 | End: 2019-02-17

## 2019-02-09 RX ORDER — CEFDINIR 300 MG/1
300 CAPSULE ORAL 2 TIMES DAILY
Qty: 14 CAPSULE | Refills: 0 | Status: SHIPPED | OUTPATIENT
Start: 2019-02-09 | End: 2019-02-16

## 2019-02-09 RX ORDER — GUAIFENESIN 600 MG/1
1200 TABLET, EXTENDED RELEASE ORAL EVERY 12 HOURS SCHEDULED
Qty: 28 TABLET | Refills: 0 | Status: SHIPPED | OUTPATIENT
Start: 2019-02-09 | End: 2019-02-16

## 2019-02-09 RX ADMIN — CITALOPRAM HYDROBROMIDE 20 MG: 20 TABLET ORAL at 09:11

## 2019-02-09 RX ADMIN — CLOPIDOGREL 75 MG: 75 TABLET, FILM COATED ORAL at 09:11

## 2019-02-09 RX ADMIN — DOXYCYCLINE 100 MG: 100 INJECTION, POWDER, LYOPHILIZED, FOR SOLUTION INTRAVENOUS at 00:10

## 2019-02-09 RX ADMIN — LEVOTHYROXINE SODIUM 50 MCG: 50 TABLET ORAL at 09:11

## 2019-02-09 RX ADMIN — BENZONATATE 200 MG: 100 CAPSULE ORAL at 06:25

## 2019-02-09 RX ADMIN — FERROUS SULFATE TAB 325 MG (65 MG ELEMENTAL FE) 325 MG: 325 (65 FE) TAB at 09:11

## 2019-02-09 RX ADMIN — Medication 1 CAPSULE: at 09:11

## 2019-02-09 RX ADMIN — RIVASTIGMINE 1 PATCH: 9.5 PATCH, EXTENDED RELEASE TRANSDERMAL at 10:18

## 2019-02-09 RX ADMIN — I-VITE, TAB 1000-60-2MG (60/BT) 1 TABLET: TAB at 09:10

## 2019-02-09 RX ADMIN — PANTOPRAZOLE SODIUM 40 MG: 40 TABLET, DELAYED RELEASE ORAL at 06:25

## 2019-02-09 RX ADMIN — METRONIDAZOLE 500 MG: 500 INJECTION, SOLUTION INTRAVENOUS at 06:25

## 2019-02-09 RX ADMIN — GUAIFENESIN 1200 MG: 600 TABLET, EXTENDED RELEASE ORAL at 09:11

## 2019-02-09 RX ADMIN — MAGNESIUM SULFATE HEPTAHYDRATE 4 G: 40 INJECTION, SOLUTION INTRAVENOUS at 10:17

## 2019-02-09 RX ADMIN — SODIUM CHLORIDE, PRESERVATIVE FREE 3 ML: 5 INJECTION INTRAVENOUS at 10:18

## 2019-02-09 NOTE — DISCHARGE SUMMARY
[facility] Medicine Services  DISCHARGE SUMMARY    Date of Admission: 2/6/2019    Date of Discharge:  2/9/2019    PCP: Nicola Resendiz APRN    Discharging Provider: Alisson Nichole PA-C     Admission Diagnosis:   Please see admission H&P    Discharge Diagnosis:   · Severe sepsis with metabolic encephalopathy  · Bibasilar PNA, CAP with probable aspiration component  · Metabolic encephalopathy superimposed on baseline dementia  · Recurrent UTIs  · Borderline hypokalemia  · Borderline hypernatremia  · Pericardial effusion  · Paroxysmal atrial fibrillation  · Chronic anticoagulation  · Chronic systolic CHF, compensated  · Diabetes mellitus type 2, ID  · Essential hypertension    Procedures Performed:  -FL video swallow on 2/7/19 with aspiration of thin liqiuds       HPI     History of Present Illness:  Floridalma Bryant is a 84 y.o. female with past medical history significant for systolic CHF with EF 40%, paroxsymal atrial fibrillation, chronic anticoagulation with Xarelto, IDDM, hypertension, dyslipidemia, recurrent UTIs, and dementia. She has known lung nodules which the family have elected to not have evaluated further due to her advanced age and dementia.   The patient was visited by home health for PT on 2/6/19 and was noted to be lethargic and was unable to participate in therapy. She went to the office of her PCP with CXR demonstrating right lower lobe pneumonia. She was admitted directly to this facility from the office of Nicola Resendiz NP.  She was found to be septic upon admission with WBC count >14,000 and reported fever of 101.9F (further labs pending) and encephalopathy, secondary to acute community acquired versus aspiration right lower lobe pneumonia    Hospital Course     Hospital Course  Floridalma Bryant was admitted as outlined in above HPI.  She was started on IV Rocephin, flagyl, and doxycycline to cover for both CAP and possible aspiration.  Speech-language pathology  consultation was placed for evaluation and was nothing by mouth.  Swallow evaluation revealed intermittent on aspiration with thin liquids.  Atypical workup was negative and doxycycline was discontinued.  CT of head was performed given metabolic encephalopathy superimposed on baseline dementia.  This was negative for any acute intracranial abnormalities with metabolic encephalopathy likely felt to be secondary to severe sepsis with bibasilar pneumonia.  UA on admission was not suggestive of UTI.  Ms. Bryant did have borderline hypokalemia for which she received electrolyte replacement protocol throughout hospitalization.  CT of her chest revealed pericardial effusion with echocardiogram performed during hospitalization revealing small pericardial effusion that appeared smaller than in previous study from 2017.    Given known history of paroxysmal atrial fibrillation, chronic anticoagulation with Xarelto was continued throughout admission pressure control.    Given swallow evaluation revealing intermittent on aspiration with thin liquids a modified diet was recommended with thickened liquids per speech therapy recommendations.  The risks of recurrent aspiration and pneumonia with allowing the patient to eat if she wishes for comfort versus continuing modified diet regular discussed at length with the patient's daughter on February 8, 2019 by Dr. Coy in addition to prior to discharge.  She did have decreased eating and drinking with thickened liquids as she did not like thickened liquids and refused to drink. Her daughter wished to discuss it with her siblings.  They did ultimately wish to decide for her to eat for comfort without thickened liquid dietary modification per discussion with Dr. Coy and her daughter over the phone on 2/9/19.     On the day of discharge, Mr. Chacon was pleasantly confused.  She was discussed with her daughter and he was felt she could be discharged home and maximum benefit of  inpatient hospital stay.  Her daughter didn't feel that she would likely do better at home she had some increased confusion from baseline during hospitalization requiring bedside sitter.    On discharge, on the seventh doxycycline was prescribed rather than Augmentin given difficulty likely swallowing large pills in setting of known aspiration.    As she refused eating and drinking with liquid thickened liquids, sodium did begin increasing; however, prior to discharge he did improve.  Please see pertinent laboratory and radiology results for further clarification of values.    On discharge, home Imdur and Lasix there were initially held due to concerns for borderline hypotension.  Given improved blood pressures during hospitalization these were resumed prior to discharge.  Additionally, home Macrobid was resumed for chronic UTI.    Discussed with AM ANNIKA Mera on day of discharge without acute events overnight.     Oxygen saturation on the day of discharge 94% on RA.      Pertinent Laboratory and Radiology Results     Pertinent Test Results:      Results from last 7 days   Lab Units 02/06/19  1907   PH, ARTERIAL pH units 7.461*   PO2 ART mm Hg 103.0*   PCO2, ARTERIAL mm Hg 34.3*   HCO3 ART mmol/L 23.9     Results from last 7 days   Lab Units 02/09/19  0534 02/08/19  0541 02/08/19  0529 02/07/19  0411 02/06/19  1933   WBC 10*3/mm3 9.59 7.86  --  9.64 14.56*   HEMOGLOBIN g/dL 11.4* 10.5*  --  11.8* 12.6   HEMATOCRIT % 36.4* 34.7*  --  36.6* 39.9   PLATELETS 10*3/mm3 226 191  --  227 241   MCV fL 91.7 93.8  --  90.6 89.5   SODIUM mmol/L 141  --  150 140 140   POTASSIUM mmol/L 3.9  --  4.6 3.5 3.6   CHLORIDE mmol/L 110  --  121* 108 104   CO2 mmol/L 25.4  --  18.4* 25.2 26.4   BUN mg/dL 5*  --  10 13 14   CREATININE mg/dL 0.52  --  0.53 0.58 0.73   GLUCOSE mg/dL 124*  --  116* 125* 140*   CALCIUM mg/dL 8.2  --  7.9 8.4 8.8        Results from last 7 days   Lab Units 02/06/19  1933   TROPONIN I ng/mL 0.008     Results  from last 7 days   Lab Units 02/09/19  0534 02/08/19  0541 02/08/19  0333 02/07/19 0411 02/06/19 1933   CRP mg/dL 4.63*  --  4.69*  --  6.99*   LACTATE mmol/L  --   --   --   --  1.0   WBC 10*3/mm3 9.59 7.86  --  9.64 14.56*     Results from last 7 days   Lab Units 02/09/19  0534 02/08/19  0529 02/07/19 0411 02/06/19 1933   BILIRUBIN mg/dL 0.5 0.4 0.5 0.7   ALK PHOS U/L 51 49 52 62   ALT (SGPT) U/L 5* 5* 5* 8*   AST (SGOT) U/L 7* 12 9* 9*           Invalid input(s): TG, LDLCALC, LDLREALC  Results from last 7 days   Lab Units 02/06/19 1933   TSH mIU/mL 0.836   HEMOGLOBIN A1C % 6.60*   BNP pg/mL 70.0   TROPONIN I ng/mL 0.008     Brief Urine Lab Results  (Last result in the past 365 days)      Color   Clarity   Blood   Leuk Est   Nitrite   Protein   CREAT   Urine HCG        02/07/19 0043 Dark Yellow Clear Negative Negative Negative Negative                        Results for orders placed during the hospital encounter of 02/06/19   Adult Transthoracic Echo Complete W/ Cont if Necessary Per Protocol    Narrative · Left ventricular systolic function is normal. Estimated EF appears to be   in the range of 61 - 65%.  · There is calcification of the aortic valve mainly affecting the right   coronary cusp(s).  · Left ventricular systolic function is normal.  · Left atrial cavity size is mildly dilated.  · Left ventricular diastolic dysfunction (grade I a) consistent with   impaired relaxation.  · There is a small (<1cm) pericardial effusion.  · Pericardial effusion appears smaller than previous study of 2017 ( 2.2   cm then) , also EF appears to have improved with no apical hypokinesia.           Order Current Status    Respiratory Culture - Sputum, Cough In process    Blood Culture - Blood, Arm, Left Preliminary result    Blood Culture - Blood, Arm, Right Preliminary result            ----------------------------------------------------------------------------------------------------------------------  Ct Head Without  Contrast    Result Date: 2/7/2019  1. Opacification of the left mastoid air cells. 2. No parenchymal mass, hemorrhage, or midline shift.  This report was finalized on 2/7/2019 10:05 AM by Dr. Daryl Rosas MD.      Ct Chest Without Contrast    Result Date: 2/7/2019  1. Parenchymal nodules bilaterally are stable. 2. Pericardial effusion is new. 3. Left basilar consolidation. 4. Upper abdomen demonstrating cholelithiasis.  This report was finalized on 2/7/2019 10:01 AM by Dr. Daryl Rosas MD.      Xr Chest 1 View    Result Date: 2/7/2019  No evidence of active or acute cardiopulmonary disease on today's chest radiograph.     This report was finalized on 2/7/2019 10:53 AM by Dr. Daryl Rosas MD.      Xr Chest Ap    Result Date: 2/9/2019  No evidence of active or acute cardiopulmonary disease on today's chest radiograph.     This report was finalized on 2/9/2019 9:51 AM by Dr. Daryl Rosas MD.      Fl Video Swallow    Result Date: 2/7/2019  Aspiration with thin liquids  For additional information please see the report provided by the speech therapy service  This report was finalized on 2/7/2019 2:41 PM by Dr. Daryl Rosas MD.          Microbiology Results (last 10 days)     Procedure Component Value - Date/Time    Legionella Antigen, Urine - Urine, Urine, Clean Catch [[id number]]  (Normal) Collected:  02/07/19 0043    Lab Status:  Final result Specimen:  Urine, Clean Catch Updated:  02/07/19 0113     LEGIONELLA ANTIGEN, URINE Negative    Narrative:       Presumptive negative for L. pneumophilia serogroup 1 antigen, suggesting no recent or current infection.    Blood Culture - Blood, Arm, Right [[id number]] Collected:  02/06/19 2032    Lab Status:  Preliminary result Specimen:  Blood from Arm, Right Updated:  02/08/19 2046     Blood Culture No growth at 2 days    Blood Culture - Blood, Arm, Left [[id number]] Collected:  02/06/19 2031    Lab Status:  Preliminary result Specimen:  Blood from Arm, Left Updated:  02/08/19  2046     Blood Culture No growth at 2 days            Discharge Vitals and Physical Examination       Vital Signs  Temp:  [97.5 °F (36.4 °C)-98.8 °F (37.1 °C)] 97.5 °F (36.4 °C)  Heart Rate:  [71-83] 83  Resp:  [18] 18  BP: (133-148)/(48-65) 139/65     Physical Exam:  General:    Awake, alert to self but pleasantly confused, in no acute distress   Heart:      Normal S1 and S2. Regular rate and rhythm. No significant murmur, rubs or gallops appreciated.   Lungs:     Respirations regular, even and unlabored. Lungs clear to auscultation B/L. No wheezes, rales or rhonchi.   Abdomen:   Soft and nontender. No guarding, rebound tenderness or  organomegaly noted. Bowel sounds present x 4.   Extremities:  No clubbing, cyanosis or edema noted. Moves UE and LE equally B/L.         Discharge Disposition, Discharge Medications, and Discharge Appointments     Discharge Disposition:   home    Condition on Discharge:  Fair    Discharge Medications:     Discharge Medications      New Medications      Instructions Start Date   benzonatate 200 MG capsule  Commonly known as:  TESSALON   200 mg, Oral, 3 Times Daily PRN      cefdinir 300 MG capsule  Commonly known as:  OMNICEF   300 mg, Oral, 2 Times Daily      guaiFENesin 600 MG 12 hr tablet  Commonly known as:  MUCINEX   1,200 mg, Oral, Every 12 Hours Scheduled      guaifenesin-dextromethorphan 100-10 MG/5ML syrup  Commonly known as:  ROBITUSSIN DM   5 mL, Oral, Every 4 Hours PRN      lactobacillus acidophilus capsule capsule   1 capsule, Oral, Daily      magnesium oxide 400 (241.3 Mg) MG tablet tablet  Commonly known as:  MAGOX   400 mg, Oral, Daily      metroNIDAZOLE 500 MG tablet  Commonly known as:  FLAGYL   500 mg, Oral, 3 Times Daily         Changes to Medications      Instructions Start Date   insulin aspart 100 UNIT/ML injection  Commonly known as:  novoLOG  What changed:  how much to take   10 Units, Subcutaneous, 3 Times Daily With Meals      insulin detemir 100 UNIT/ML  injection  Commonly known as:  LEVEMIR  What changed:  how much to take   15 Units, Subcutaneous, Nightly         Continue These Medications      Instructions Start Date   acetaminophen 325 MG tablet  Commonly known as:  TYLENOL   650 mg, Oral, Every 4 Hours PRN      citalopram 10 MG tablet  Commonly known as:  CeleXA   20 mg, Oral, Daily      clopidogrel 75 MG tablet  Commonly known as:  PLAVIX   75 mg, Oral, Daily      docusate calcium 240 MG capsule  Commonly known as:  SURFAK   240 mg, Oral, Daily PRN      ferrous sulfate 325 (65 FE) MG tablet   325 mg, Oral, Daily With Breakfast      furosemide 20 MG tablet  Commonly known as:  LASIX   40 mg, Oral, Daily      ICAPS AREDS 2 PO   1 capsule, Oral, Daily      isosorbide mononitrate 30 MG 24 hr tablet  Commonly known as:  IMDUR   30 mg, Oral, Daily      levothyroxine 25 MCG tablet  Commonly known as:  SYNTHROID, LEVOTHROID   50 mcg, Oral, Daily      nitrofurantoin (macrocrystal-monohydrate) 100 MG capsule  Commonly known as:  MACROBID   100 mg, Oral, Daily      omeprazole 20 MG capsule  Commonly known as:  priLOSEC   20 mg, Oral, Daily      potassium chloride 10 MEQ CR tablet  Commonly known as:  K-DUR   10 mEq, Oral, 2 Times Daily      rivaroxaban 15 MG tablet  Commonly known as:  XARELTO   15 mg, Oral, Daily With Dinner      rivastigmine 9.5 MG/24HR patch  Commonly known as:  EXELON   1 patch, Transdermal, Daily         Stop These Medications    carvedilol 6.25 MG tablet  Commonly known as:  COREG            Discharged medication regimen discussed with attending physician prior to discharge.     Discharge Diet:         Activity at Discharge:  activity as tolerated         Discharge Disposition:    Home or Self Care        Follow-up Appointments:  Your Scheduled Appointments    Feb 18, 2019 10:15 AM EST  Follow Up with LILY Phan  [facility] CARDIOLOGY (--) [address] [address] [phone]   Arrive 15 minutes  prior to appointment.    Additional instructions:      Please call as soon as possible to schedule an appointment to see LILY Christensen in one week.  You can reach the office at .    [facility] to continue home services.  You can reach the office at [phone].                    Additional Instructions for the Follow-ups that You Need to Schedule     Discharge Follow-up with PCP   As directed       Currently Documented PCP:    Nicola Resendiz APRN    PCP Phone Number:    [phone]     Follow Up Details:  Follow up with LILY Christensen in 1 week.            Contact information for follow-up providers     Nicola Resendiz APRN Follow up.    Specialty:  Certified Clinical Nurse Specialist  Why:  Follow up with LILY Christensen in 1 week.  Contact information:  [address] [place] [id number]  [phone]                   Contact information for after-discharge care     Home Medical Care     [facility] .    Service:  Home Health Services  Contact information:  [address] [facility] [id number] [phone]                             Additional Instructions for the Follow-ups that You Need to Schedule     Discharge Follow-up with PCP   As directed       Currently Documented PCP:    Nicola Resendiz APRN    PCP Phone Number:    [phone]     Follow Up Details:  Follow up with LILY Christensen in 1 week.               Test Results Pending at Discharge:     Order Current Status    Respiratory Culture - Sputum, Cough In process    Blood Culture - Blood, Arm, Left Preliminary result    Blood Culture - Blood, Arm, Right Preliminary result             Alisson Nichole PA-C  [facility] Medicine Team  02/09/19  3:30 PM      Time: Greater than 30 minutes spent on this discharge.

## 2019-02-09 NOTE — PLAN OF CARE
Problem: Patient Care Overview  Goal: Plan of Care Review  Outcome: Ongoing (interventions implemented as appropriate)   02/08/19 1201 02/08/19 2100   Coping/Psychosocial   Plan of Care Reviewed With --  patient   Plan of Care Review   Progress no change --      Goal: Individualization and Mutuality  Outcome: Ongoing (interventions implemented as appropriate)    Goal: Discharge Needs Assessment  Outcome: Ongoing (interventions implemented as appropriate)    Goal: Interprofessional Rounds/Family Conf  Outcome: Ongoing (interventions implemented as appropriate)      Problem: Skin Injury Risk (Adult)  Goal: Identify Related Risk Factors and Signs and Symptoms  Outcome: Ongoing (interventions implemented as appropriate)   02/06/19 2342   Skin Injury Risk (Adult)   Related Risk Factors (Skin Injury Risk) advanced age;fluid intake inadequate;infection;medication;mobility impaired;nutritional deficiencies;tissue perfusion altered     Goal: Skin Health and Integrity  Outcome: Ongoing (interventions implemented as appropriate)      Problem: Fall Risk (Adult)  Goal: Identify Related Risk Factors and Signs and Symptoms  Outcome: Ongoing (interventions implemented as appropriate)   02/06/19 2342   Fall Risk (Adult)   Related Risk Factors (Fall Risk) age-related changes;confusion/agitation;fatigue/slow reaction;gait/mobility problems;history of falls;impaired vision;objects hard to reach;environment unfamiliar;slippery/uneven surfaces   Signs and Symptoms (Fall Risk) presence of risk factors     Goal: Absence of Fall  Outcome: Ongoing (interventions implemented as appropriate)      Problem: Activity Intolerance (Adult)  Goal: Identify Related Risk Factors and Signs and Symptoms  Outcome: Ongoing (interventions implemented as appropriate)   02/06/19 2342 02/08/19 0103   Activity Intolerance (Adult)   Related Risk Factors (Activity Intolerance) generalized weakness;O2 supply/demand imbalance --    Signs and Symptoms (Activity  Intolerance) --  unable to complete BADL/IADL     Goal: Activity Tolerance  Outcome: Ongoing (interventions implemented as appropriate)    Goal: Effective Energy Conservation Techniques  Outcome: Outcome(s) achieved Date Met: 02/09/19

## 2019-02-09 NOTE — DISCHARGE INSTR - APPOINTMENTS
Please call as soon as possible to schedule an appointment to see LILY Christensen in one week.  You can reach the office at .    [facility] to continue home services.  You can reach the office at [phone].

## 2019-02-09 NOTE — PLAN OF CARE
Problem: Patient Care Overview  Goal: Plan of Care Review  Outcome: Ongoing (interventions implemented as appropriate)    Goal: Individualization and Mutuality  Outcome: Ongoing (interventions implemented as appropriate)      Problem: Skin Injury Risk (Adult)  Goal: Identify Related Risk Factors and Signs and Symptoms  Outcome: Ongoing (interventions implemented as appropriate)    Goal: Skin Health and Integrity  Outcome: Ongoing (interventions implemented as appropriate)      Problem: Fall Risk (Adult)  Goal: Identify Related Risk Factors and Signs and Symptoms  Outcome: Ongoing (interventions implemented as appropriate)    Goal: Absence of Fall  Outcome: Ongoing (interventions implemented as appropriate)      Problem: Activity Intolerance (Adult)  Goal: Identify Related Risk Factors and Signs and Symptoms  Outcome: Ongoing (interventions implemented as appropriate)    Goal: Activity Tolerance  Outcome: Ongoing (interventions implemented as appropriate)

## 2019-02-09 NOTE — DISCHARGE PLACEMENT REQUEST
"Floridalma Bryant (84 y.o. Female)     Date of Birth Social Security Number Address Home Phone MRN    1934  [address]  [facility] [address] [phone] [id number]    Christian Marital Status          Synagogue        Admission Date Admission Type Admitting Provider Attending Provider Department, Room/Bed    2/6/19 Urgent Jaycob Coy DO Troxell, Christopher A, DO [address], 3346/1S    Discharge Date Discharge Disposition Discharge Destination         Home or Self Care              Attending Provider:  Jaycob Coy DO    Allergies:  Codeine, Metformin And Related    Isolation:  None   Infection:  None   Code Status:  No CPR    Ht:  142.2 cm (56\")   Wt:  57 kg (125 lb 11.2 oz)    Admission Cmt:  None   Principal Problem:  None                Active Insurance as of 2/6/2019     Primary Coverage     Payor Plan Insurance Group Employer/Plan Group    HUMANA MEDICARE REPLACEMENT HUMANA MEDICARE REPL [id number]     Payor Plan Address Payor Plan Phone Number Payor Plan Fax Number Effective Dates    [address] [address] [phone]  7/1/2018 - None Entered    [facility] [id number]       Subscriber Name Subscriber Birth Date Member ID       FLORIDALMA BRYANT 1934 [id number]           Secondary Coverage     Payor Plan Insurance Group Employer/Plan Group    [place]      Payor Plan Address Payor Plan Phone Number Payor Plan Fax Number Effective Dates    [address] [phone]  4/16/2018 - None Entered    [facility] [address]       Subscriber Name Subscriber Birth Date Member ID       FLORIDALMA BRYANT 1934 [id number]                 Emergency Contacts      (Rel.) Home Phone Work Phone Mobile Phone    Jaz Loredo (Power of ) [phone] -- --            Emergency Contact Information     Name Relation Home Work Mobile    Jaz Loredo Power of  [phone]            Insurance Information             "    HUMANA MEDICARE REPLACEMENT/HUMANA MEDICARE REPL Phone: [phone]    Subscriber: Floridalma Bryant Subscriber#: [id number]    Group#: [id number] Precert#:         [place] Phone: [phone]    Subscriber: Floridalma Bryant Subscriber#: [id number]    Group#:  Precert#:              History & Physical      Sebastian Costa MD at 2019  7:28 PM               [facility] HOSPITALIST HISTORY AND PHYSICAL    Patient Identification:  Name:  Floridalma Bryant  Age:  84 y.o.  Sex:  female  :  1934  MRN:  [id number]   Visit Number:  [id number]  Primary Care Physician:  Nicola Resendiz APRN     2019  6:38 PM    Subjective     Chief complaint: Cough, altered mental status.     History of presenting illness:   Ms. Jimenez is a 84 y.o. female with past medical history significant for systolic CHF with EF 40%, paroxsymal atrial fibrillation, chronic anticoagulation with Xarelto, IDDM, hypertension, dyslipidemia, recurrent UTIs, and dementia. She has known lung nodules which the family have elected to not have evaluated further due to her advanced age and dementia.   The patient was visited by home health for PT today and was noted to be lethargic and was unable to participate in therapy. Her daughter at bedside reports that she has had a decline in her baseline mental status since yesterday as well. She has a chronic cough, however, it has become more severe over the last few days with production of green sputum. She was taken to the office of her PCP today where she was noted to have a fever of 101.9F. She was tested for influenza which was negative. She had a CXR in their office showing a reported right lower lobe pneumonia. O2sats were reported to be in the low 90s on room air. Hospitalist services were contacted for direct admission. She has been accepted to the medical/surgery unit for further evaluation and therapy. The patient is currently awake but appears weak and is  very heard of hearing; she cannot really give any history and is only oriented to self. She will obey simple commands and is moving all 4 extremities. At baseline, she lives at home with 24 hour care and home health with nursing care and PT. Her daughter and son-in-law live next door. She is generally able to get up and bathe with assistance and is able to carry on some conversation and recognizes family members per her daughter at bedside.  ---------------------------------------------------------------------------------------------------------------------   Review of Systems   Unable to perform ROS: Mental status change   Respiratory: Positive for cough.    Cardiovascular: Negative for chest pain.   Gastrointestinal: Negative for abdominal distention.      ---------------------------------------------------------------------------------------------------------------------   Past Medical History:   Diagnosis Date   • Afib (CMS/[facility])    • Dementia    • Dementia    • Diabetes mellitus (CMS/[facility])    • Hypertension    • Pancreatitis    • Systolic CHF (CMS/[facility])        Past Surgical History:   Procedure Laterality Date   • ANKLE SURGERY     • HYSTERECTOMY     • SHOULDER SURGERY     • TYMPANOPLASTY       History reviewed. No pertinent family history.  Social History     Socioeconomic History   • Marital status:      Spouse name: Not on file   • Number of children: Not on file   • Years of education: Not on file   • Highest education level: Not on file   Tobacco Use   • Smoking status: Never Smoker   • Smokeless tobacco: Never Used   Substance and Sexual Activity   • Alcohol use: No   • Drug use: No   • Sexual activity: Defer     ---------------------------------------------------------------------------------------------------------------------   Allergies:  Codeine and Metformin and related  ---------------------------------------------------------------------------------------------------------------------   Prior  to Admission Medications     Prescriptions Last Dose Informant Patient Reported? Taking?    acetaminophen (TYLENOL) 325 MG tablet   No No    Take 2 tablets by mouth Every 4 (Four) Hours As Needed for Headache or Fever (temperature greater than 101.5 F).    atorvastatin (LIPITOR) 80 MG tablet   No No    Take 1 tablet by mouth Every Night.    busPIRone (BUSPAR) 10 MG tablet   Yes No    Take 10 mg by mouth 2 (Two) Times a Day.    carvedilol (COREG) 6.25 MG tablet   No No    Take 1 tablet by mouth 2 (Two) Times a Day With Meals.    cefuroxime (CEFTIN) 250 MG tablet   Yes No    Take 250 mg by mouth 2 (Two) Times a Day.    ciprofloxacin-dexamethasone (CIPRODEX) 0.3-0.1 % otic suspension   Yes No    4 drops 2 (Two) Times a Day.    citalopram (CeleXA) 10 MG tablet   Yes No    Take 20 mg by mouth Daily.    clopidogrel (PLAVIX) 75 MG tablet   No No    Take 1 tablet by mouth Daily.    docusate calcium (SURFAK) 240 MG capsule   Yes No    Take 240 mg by mouth 2 (Two) Times a Day.    donepezil (ARICEPT) 5 MG tablet   Yes No    Take 5 mg by mouth Every Night.    ferrous sulfate 325 (65 FE) MG tablet  Child Yes No    Take 325 mg by mouth Daily With Breakfast.    Fexofenadine HCl (ALLEGRA PO)  Child Yes No    Take 180 mg by mouth 2 (Two) Times a Day.    fluticasone (FLONASE) 50 MCG/ACT nasal spray   Yes No    2 sprays into each nostril Daily.    furosemide (LASIX) 20 MG tablet   No No    Take 2 tablets by mouth Daily.    Patient taking differently:  Take 40 mg by mouth Every Other Day As Needed.    glucagon (GLUCAGEN) 1 MG injection   Yes No    Infuse  into a venous catheter 1 (One) Time.    guaiFENesin (ROBITUSSIN) 100 MG/5ML solution oral solution   No No    Take 10 mL by mouth Every 6 (Six) Hours As Needed (cough and congestion).    haloperidol (HALDOL) 5 MG tablet   Yes No    Take 5 mg by mouth 4 (Four) Times a Day.    hydrALAZINE (APRESOLINE) 10 MG tablet   Yes No    Take 10 mg by mouth 3 (Three) Times a Day.    hydrocortisone  0.5 % cream   Yes No    Apply  topically 2 (Two) Times a Day.    hydrOXYzine (ATARAX) 25 MG tablet   No No    Take 1 tablet by mouth Every 8 (Eight) Hours As Needed for Itching.    hydrOXYzine (VISTARIL) 25 MG capsule   Yes No    Take 25 mg by mouth 3 (Three) Times a Day As Needed for Itching.    insulin aspart (novoLOG) 100 UNIT/ML injection   No No    Inject 10 Units under the skin 3 (Three) Times a Day With Meals.    insulin detemir (LEVEMIR) 100 UNIT/ML injection   No No    Inject 25 Units under the skin Every Night.    ipratropium (ATROVENT) 0.02 % nebulizer solution   No No    Take 2.5 mL by nebulization 4 (Four) Times a Day As Needed for Wheezing or Shortness of Air.    ipratropium-albuterol (DUO-NEB) 0.5-2.5 mg/mL nebulizer   No No    Take 3 mL by nebulization Every 4 (Four) Hours As Needed for Shortness of Air.    isosorbide mononitrate (IMDUR) 30 MG 24 hr tablet   No No    Take 1 tablet by mouth Daily.    levothyroxine (SYNTHROID, LEVOTHROID) 25 MCG tablet   Yes No    Take 50 mcg by mouth Daily.    linagliptin (TRADJENTA) 5 MG tablet tablet  Child Yes No    Take 5 mg by mouth Daily.    loperamide (IMODIUM) 2 MG capsule   No No    Take 1 capsule by mouth 3 (Three) Times a Day As Needed for Diarrhea.    losartan (COZAAR) 100 MG tablet   Yes No    Take 100 mg by mouth Daily.    nystatin (MYCOSTATIN) 304529 UNIT/GM cream   Yes No    Apply  topically 2 (Two) Times a Day.    omeprazole (priLOSEC) 20 MG capsule  Child Yes No    Take 20 mg by mouth Daily.    potassium chloride (K-DUR) 10 MEQ CR tablet   Yes No    Take 10 mEq by mouth 2 (Two) Times a Day.    prednisoLONE acetate (PRED FORTE) 1 % ophthalmic suspension   Yes No    1 drop 4 (Four) Times a Day.    rivaroxaban (XARELTO) 15 MG tablet   No No    Take 1 tablet by mouth Daily With Dinner.    rivastigmine (EXELON) 9.5 MG/24HR patch  Child Yes No    Place 1 patch on the skin Daily.    rivastigmine (EXELON) 9.5 MG/24HR patch   Yes No    Place 1 patch on the  skin Daily.    valsartan (DIOVAN) 80 MG tablet   No No    Take 1 tablet by mouth Daily.    vitamin D (ERGOCALCIFEROL) 84509 units capsule capsule  Child Yes No    Take 50,000 Units by mouth 1 (One) Time Per Week.        ---------------------------------------------------------------------------------------------------------------------  Objective   Hospital Scheduled Meds:    ceftriaxone 1 g Intravenous Q24H   doxycycline 100 mg Intravenous Q12H   insulin aspart 0-7 Units Subcutaneous 4x Daily AC & at Bedtime   metroNIDAZOLE 500 mg Intravenous Q8H   sodium chloride 3 mL Intravenous Q12H       sodium chloride 75 mL/hr     ---------------------------------------------------------------------------------------------------------------------   Vital Signs:  Temp:  [98.4 °F (36.9 °C)] 98.4 °F (36.9 °C)  Heart Rate:  [88] 88  Resp:  [18] 18  BP: (96)/(63) 96/63  No data found.  SpO2 Percentage    02/06/19 1800   SpO2: 94%     SpO2:  [94 %] 94 %  on   ;        Body mass index is 26.59 kg/m².  Wt Readings from Last 3 Encounters:   02/06/19 53.8 kg (118 lb 9.6 oz)   07/17/18 65 kg (143 lb 6.4 oz)   05/27/18 71.7 kg (158 lb)     ---------------------------------------------------------------------------------------------------------------------   Physical Exam:  Constitutional:  Elderly female, chronically ill in appearance. Awake at this time, non-toxic in appearance, very hard of hearing  HENT:  Head: Normocephalic and atraumatic.  Mouth: Somewhat dry.   Eyes:  Conjunctivae and EOM are normal. No scleral icterus. No erythema or drainage. PERRL.  Neck:  Neck supple.  No JVD present. No carotid bruits noted.   Cardiovascular:  Normal rate, regular rhythm and normal heart sounds with no murmur.  Pulmonary/Chest: Poor inspiratory effort. Increased effort elicits hacking cough. No respiratory distress, no wheezes. Rales noted in the right middle and lower lung fields.   Abdominal:  Soft.  Bowel sounds are present x4.  No  distension and no tenderness.   Musculoskeletal:  No edema, no tenderness, and no deformity.  No red or swollen joints anywhere.    Neurological: Awake. Oriented to self but not age, time or place. Can follow simple commands. No cranial nerve deficit.  No tongue deviation.  No facial droop.  No slurred speech. Negative babinski bilaterally.   Skin:  Skin is warm and dry.  No rash noted.  No pallor.   Psychiatric: Lethargic.   Peripheral vascular:  No edema and 2+ pedal pulses bilaterally.   Genitourinary: No bernard catheter in place.   ---------------------------------------------------------------------------------------------------------------------  EKG: NSR, HR 87, QTc 500 but 450 when corrected for RBBB. Left axis deviation. No overt ST changes to suggest acute ishemia appreciated.    Telemetry:  Not yet on telemetry monitoring.     I have personally reviewed the EKG/Telemetry strip.  ---------------------------------------------------------------------------------------------------------------------           Results from last 7 days   Lab Units 02/06/19  1907   PH, ARTERIAL pH units 7.461*   PO2 ART mm Hg 103.0*   PCO2, ARTERIAL mm Hg 34.3*   HCO3 ART mmol/L 23.9     Results from last 7 days   Lab Units 02/06/19  1933   WBC 10*3/mm3 14.56*   HEMOGLOBIN g/dL 12.6   HEMATOCRIT % 39.9   MCV fL 89.5   MCHC g/dL 31.6*   PLATELETS 10*3/mm3 241         Invalid input(s): PROTCrCl cannot be calculated (Patient's most recent lab result is older than the maximum 30 days allowed.).  No results found for: AMMONIA    No results found for: HGBA1C, POCGLU  Lab Results   Component Value Date    HGBA1C 8.90 (H) 10/26/2017     Lab Results   Component Value Date    TSH 2.215 10/28/2017     Pain Management Panel     There is no flowsheet data to display.        I have personally reviewed the above laboratory results.    ---------------------------------------------------------------------------------------------------------------------  Imaging Results (last 7 days)     Procedure Component Value Units Date/Time    XR Chest 1 View [[id number]] Updated:  02/06/19 1933      Hazy right sided cardiac sillhouette border suggestive of possible right lower lobe infiltrate.    I have personally reviewed the above radiology results.   ---------------------------------------------------------------------------------------------------------------------  Assessment & Plan        -Severe sepsis, present on admission, as noted by WBC count >14,000 and reported fever of 101.9F (further labs pending) and encephalopathy, secondary to acute community acquired versus aspiration right lower lobe pneumonia: Check blood cultures x2. Supplemental O2 via nasal cannula to maintain oxygen saturations >90%. Rule out atypical organisms by checking urinary legionella and mycoplasma studies. Check respiratory cultures. Place on IV rocephin, doxycycline, and flagyl to cover for community acquired and aspiration pneumonia. Will add nebulizer treatments once lactic acid has returned. Will keep NPO for now except for sips with meds. Consult SLP for swallow evaluation based on family's reports of history of dysphagia with aspiration in the past (daughter states she her swallowing has not been evaluated in the last 2 years). Place on gentle IVF hydration with normal saline at 75cc/hr. Repeat CBC and CRP in AM.     -Encephalopathy superimposed on baseline dementia, likely secondary to above sepsis. STAT CT head and chest pending. Basic labs also still pending. Continue treatment for pneumonia as noted above.    -Chronic UTIs: UA with culture if indicated pending. She is reported to have been on chronic Macrobid therapy since 09/2018 for recurrent UTIs. She also just recently finished a 10 day course of Augmentin last Friday. She took Diflucan last evening for reported  yeast in an outpatient urinalysis and was due for another dose tomorrow. Await reconciliation of home meds by pharmacy.    -IDDM: Will place on low dose sliding scale insulin with finger stick blood glucose readings AC and HS. Place on hypoglycemia protocol. Check hemoglobin A1c level. Review home insulin regimen once meds reconciled by pharmacy.    -Paroxysmal atrial fibrillation: Chronically anticoagulated with Xarelto for stroke prevention. STAT EKG above shows patient currently in sinus rhythm. Monitor on telemetry. Monitor electrolytes. TSH and magnesium level pending.     -Chronic systolic congestive heart failure: Last known EF at 40% in 10/2017. She does not appear to be fluid overloaded at this point. Check BNP. Chest XR not felt to show pulmonary edema.      -History of essential hypertension: now borderline hypotensive. Will give light IVF and hold antihypertensive medications for now. Add IV hydralazine 10mg TID PRN for SBP >160.     -Baseline dementia: Place on fall and aspiration precautions. Bedside sitter ordered.    -History of pulmonary nodules: Family did not wish to pursue further invasive evaluation. Will obtain CT chest to evaluate further and ensure pneumonia is not post-obstructive in etiology.     -Thoracic back pain: CT chest pending, will see if there are any obvious bony abnormalities on that scan.    -DVT Prophylaxis: Xarelto at home.    * The patient is considered to be a high risk patient due to: altered mental status, severe sepsis, pneumonia, advanced age with dementia, CHF, EF 40%.     Code Status: No CPR. Daughter is agreeable to short term intubation. Otherwise full interventions.     I have discussed the patient's assessment and plan with the patient's daughter, son-in-law, and admitting physician, Dr. Costa.     Sebastian Costa MD  02/06/19  8:11 PM  ---------------------------------------------------------------------------------------------------------------------   *  I have seen the patient in conjunction with KACI Chase, and have amended her note to reflect my own findings, assessment and plan. RN Mrecedes Singh accompanied me and was present during the entire interview and physical examination on [facility].    Electronically signed by Sebastian Costa MD at 2/6/2019  8:22 PM       Hospital Medications (active)       Dose Frequency Start End    benzonatate (TESSALON) capsule 200 mg 200 mg 3 Times Daily PRN 2/8/2019     Sig - Route: Take 2 capsules by mouth 3 (Three) Times a Day As Needed for Cough. - Oral    cefTRIAXone (ROCEPHIN) 1 g/100 mL 0.9% NS (MBP) 1 g Every 24 Hours 2/6/2019 2/13/2019    Sig - Route: Infuse 100 mL into a venous catheter Daily. - Intravenous    citalopram (CeleXA) tablet 20 mg 20 mg Daily 2/7/2019     Sig - Route: Take 1 tablet by mouth Daily. - Oral    Cosign for Ordering: Accepted by Sebastian Costa MD on 2/7/2019  6:03 AM    clopidogrel (PLAVIX) tablet 75 mg 75 mg Daily 2/7/2019     Sig - Route: Take 1 tablet by mouth Daily. - Oral    Cosign for Ordering: Accepted by Sebastian Costa MD on 2/7/2019  6:03 AM    dextrose (D50W) 25 g/ 50mL Intravenous Solution 25 g 25 g Every 15 Minutes PRN 2/6/2019     Sig - Route: Infuse 50 mL into a venous catheter Every 15 (Fifteen) Minutes As Needed for Low Blood Sugar (Blood Sugar Less Than 70). - Intravenous    Cosign for Ordering: Accepted by Sebastian Costa MD on 2/6/2019  7:50 PM    dextrose (GLUTOSE) oral gel 15 g 15 g Every 15 Minutes PRN 2/6/2019     Sig - Route: Take 15 g by mouth Every 15 (Fifteen) Minutes As Needed for Low Blood Sugar (Blood sugar less than 70). - Oral    Cosign for Ordering: Accepted by Sebastian Costa MD on 2/6/2019  7:50 PM    docusate sodium (COLACE) capsule 200 mg 200 mg Daily PRN 2/6/2019     Sig - Route: Take 2 capsules by mouth Daily As Needed for Constipation. - Oral    Cosign for Ordering: Accepted by Sebastian Costa MD on 2/7/2019   6:03 AM    doxycycline (VIBRAMYCIN) 100 mg/100 mL 0.9% NS  mg Every 12 Hours 2/6/2019 2/13/2019    Sig - Route: Infuse 100 mL into a venous catheter Every 12 (Twelve) Hours. - Intravenous    ferrous sulfate tablet 325 mg 325 mg Daily With Breakfast 2/7/2019     Sig - Route: Take 1 tablet by mouth Daily With Breakfast. - Oral    Cosign for Ordering: Accepted by Sebastian Costa MD on 2/7/2019  6:03 AM    glucagon (human recombinant) (GLUCAGEN DIAGNOSTIC) injection 1 mg 1 mg As Needed 2/6/2019     Sig - Route: Inject 1 mg under the skin into the appropriate area as directed As Needed (Blood Glucose Less Than 70). - Subcutaneous    Cosign for Ordering: Accepted by Sebastian Costa MD on 2/6/2019  7:50 PM    guaiFENesin (MUCINEX) 12 hr tablet 1,200 mg 1,200 mg Every 12 Hours Scheduled 2/7/2019     Sig - Route: Take 2 tablets by mouth Every 12 (Twelve) Hours. - Oral    guaifenesin-dextromethorphan (ROBITUSSIN DM) 100-10 MG/5ML syrup 5 mL 5 mL Every 4 Hours PRN 2/9/2019     Sig - Route: Take 5 mL by mouth Every 4 (Four) Hours As Needed for Cough. - Oral    hydrALAZINE (APRESOLINE) injection 10 mg 10 mg Every 6 Hours PRN 2/6/2019     Sig - Route: Infuse 0.5 mL into a venous catheter Every 6 (Six) Hours As Needed for High Blood Pressure. - Intravenous    I-chelo tablet 1 tablet 1 tablet Daily 2/7/2019     Sig - Route: Take 1 tablet by mouth Daily. - Oral    Cosign for Ordering: Accepted by Sebastian Costa MD on 2/7/2019  6:03 AM    insulin aspart (novoLOG) injection 0-7 Units 0-7 Units 4 Times Daily Before Meals & Nightly 2/6/2019     Sig - Route: Inject 0-7 Units under the skin into the appropriate area as directed 4 (Four) Times a Day Before Meals & at Bedtime. - Subcutaneous    Cosign for Ordering: Accepted by Sebastian Costa MD on 2/6/2019  7:50 PM    lactobacillus acidophilus (RISAQUAD) capsule 1 capsule 1 capsule Daily 2/7/2019     Sig - Route: Take 1 capsule by mouth Daily. - Oral     "levothyroxine (SYNTHROID, LEVOTHROID) tablet 50 mcg 50 mcg Daily 2/7/2019     Sig - Route: Take 1 tablet by mouth Daily. - Oral    Cosign for Ordering: Accepted by Sebastian Costa MD on 2/7/2019  6:03 AM    Magnesium Sulfate 2 gram / 50mL Infusion (GIVE X 3 BAGS TO EQUAL 6GM TOTAL DOSE) - Mg 1.1 - 1.5 mg/dl 2 g As Needed 2/7/2019     Sig - Route: Infuse 50 mL into a venous catheter As Needed (See Administration Instructions). - Intravenous    Linked Group 1:  \"Or\" Linked Group Details        Magnesium Sulfate 2 gram Bolus, followed by 8 gram infusion (total Mg dose 10 grams)- Mg less than or equal to 1mg/dL 2 g As Needed 2/7/2019     Sig - Route: Infuse 50 mL into a venous catheter As Needed (See Administration Instructions). - Intravenous    Linked Group 1:  \"Or\" Linked Group Details        Magnesium Sulfate 4 gram infusion- Mg 1.6-1.9 mg/dL 4 g As Needed 2/7/2019     Sig - Route: Infuse 100 mL into a venous catheter As Needed (See Administration Instructions). - Intravenous    Linked Group 1:  \"Or\" Linked Group Details        magnesium sulfate 4g/100mL (PREMIX) infusion 4 g Once 2/9/2019     Sig - Route: Infuse 100 mL into a venous catheter 1 (One) Time. - Intravenous    metroNIDAZOLE (FLAGYL) IVPB 500 mg 500 mg Every 8 Hours 2/6/2019 2/13/2019    Sig - Route: Infuse 100 mL into a venous catheter Every 8 (Eight) Hours. - Intravenous    nitroglycerin (NITROSTAT) SL tablet 0.4 mg 0.4 mg Every 5 Minutes PRN 2/6/2019     Sig - Route: Place 1 tablet under the tongue Every 5 (Five) Minutes As Needed for Chest Pain (if systolic BP greater than 100 mm/Hg.). - Sublingual    Cosign for Ordering: Accepted by Sebastian Costa MD on 2/6/2019  7:50 PM    pantoprazole (PROTONIX) EC tablet 40 mg 40 mg Every Morning 2/7/2019     Sig - Route: Take 1 tablet by mouth Every Morning. - Oral    Cosign for Ordering: Accepted by Sebastian Costa MD on 2/7/2019  6:03 AM    potassium chloride (K-DUR,KLOR-CON) ER tablet 40 " "mEq 40 mEq As Needed 2/7/2019     Sig - Route: Take 4 tablets by mouth As Needed (potassium replacement.  see admin instructions). - Oral    Linked Group 2:  \"Or\" Linked Group Details        potassium chloride (KLOR-CON) packet 40 mEq 40 mEq As Needed 2/7/2019     Sig - Route: Take 40 mEq by mouth As Needed (potassium replacement, see admin instructions). - Oral    Linked Group 2:  \"Or\" Linked Group Details        potassium chloride 10 mEq in 100 mL IVPB 10 mEq Every 1 Hour PRN 2/7/2019     Sig - Route: Infuse 100 mL into a venous catheter Every 1 (One) Hour As Needed (potassium protocol PERIPHERAL - see admin instructions). - Intravenous    Linked Group 2:  \"Or\" Linked Group Details        potassium phosphate 15 mmol in sodium chloride 0.9 % 100 mL infusion 15 mmol As Needed 2/8/2019     Sig - Route: Infuse 15 mmol into a venous catheter As Needed (Peripheral IV - Phosphorus 1.8 - 2.5 mg/dL). - Intravenous    Linked Group 3:  \"Or\" Linked Group Details        potassium phosphate 30 mmol in sodium chloride 0.9 % 250 mL infusion 30 mmol As Needed 2/8/2019     Sig - Route: Infuse 30 mmol into a venous catheter As Needed (Peripheral IV - Phosphorus 1.3 - 1.7 mg/dL). - Intravenous    Linked Group 3:  \"Or\" Linked Group Details        potassium phosphate 45 mmol in sodium chloride 0.9 % 500 mL infusion 45 mmol As Needed 2/8/2019     Sig - Route: Infuse 45 mmol into a venous catheter As Needed (Peripheral IV - Phosphorus Less Than 1.3 mg/dL). - Intravenous    Linked Group 3:  \"Or\" Linked Group Details        rivaroxaban (XARELTO) tablet 15 mg 15 mg Daily With Dinner 2/7/2019     Sig - Route: Take 1 tablet by mouth Daily With Dinner. - Oral    Cosign for Ordering: Accepted by Sebastian Costa MD on 2/7/2019  6:03 AM    rivastigmine (EXELON) 9.5 MG/24HR patch 1 patch 1 patch Daily 2/7/2019     Sig - Route: Place 1 patch on the skin as directed by provider Daily. - Transdermal    Cosign for Ordering: Accepted by Igor" "Sebastian David MD on 2/7/2019  6:03 AM    sodium chloride 0.9 % flush 1-10 mL 1-10 mL As Needed 2/6/2019     Sig - Route: Infuse 1-10 mL into a venous catheter As Needed for Line Care. - Intravenous    Cosign for Ordering: Accepted by Sebastian Costa MD on 2/6/2019  7:50 PM    sodium chloride 0.9 % flush 3 mL 3 mL Every 12 Hours Scheduled 2/6/2019     Sig - Route: Infuse 3 mL into a venous catheter Every 12 (Twelve) Hours. - Intravenous    Cosign for Ordering: Accepted by Sebastian Costa MD on 2/6/2019  7:50 PM    sodium phosphates 15 mmol in sodium chloride 0.9 % 250 mL IVPB 15 mmol As Needed 2/8/2019     Sig - Route: Infuse 15 mmol into a venous catheter As Needed (Peripheral IV - Phosphorus 1.8 - 2.5 mg/dL & Potassium Greater Than 4). - Intravenous    Linked Group 3:  \"Or\" Linked Group Details        sodium phosphates 15 mmol in sodium chloride 0.9 % 250 mL IVPB 15 mmol Once 2/8/2019 2/8/2019    Sig - Route: Infuse 15 mmol into a venous catheter 1 (One) Time. - Intravenous    sodium phosphates 30 mmol in sodium chloride 0.9 % 250 mL IVPB 30 mmol As Needed 2/8/2019     Sig - Route: Infuse 30 mmol into a venous catheter As Needed (Peripheral IV - Phosphorus 1.3-1.7 mg/dL & Potassium Greater Than 4). - Intravenous    Linked Group 3:  \"Or\" Linked Group Details        sodium phosphates 45 mmol in sodium chloride 0.9 % 500 mL IVPB 45 mmol As Needed 2/8/2019     Sig - Route: Infuse 45 mmol into a venous catheter As Needed (Peripheral IV - Phosphorus Less Than 1.3 mg/dL & Potassium Greater Than 4). - Intravenous    Linked Group 3:  \"Or\" Linked Group Details        sodium chloride 0.9 % infusion (Discontinued) 75 mL/hr Continuous 2/6/2019 2/8/2019    Sig - Route: Infuse 75 mL/hr into a venous catheter Continuous. - Intravenous            Lab Results (last 24 hours)     Procedure Component Value Units Date/Time    Respiratory Culture - Sputum, Cough [[id number]] Collected:  02/09/19 1119    Specimen:  Sputum " from Cough Updated:  02/09/19 1119    Osmolality, Calculated [[id number]]  (Normal) Collected:  02/09/19 0534    Specimen:  Blood Updated:  02/09/19 0658     Osmolality Calc 279.9 mOsm/kg     Comprehensive Metabolic Panel [[id number]]  (Abnormal) Collected:  02/09/19 0534    Specimen:  Blood Updated:  02/09/19 0657     Glucose 124 mg/dL      BUN 5 mg/dL      Creatinine 0.52 mg/dL      Sodium 141 mmol/L      Potassium 3.9 mmol/L      Chloride 110 mmol/L      CO2 25.4 mmol/L      Calcium 8.2 mg/dL      Total Protein 5.3 g/dL      Albumin 3.30 g/dL      ALT (SGPT) 5 U/L      AST (SGOT) 7 U/L      Alkaline Phosphatase 51 U/L      Comment: Note New Reference Ranges        Total Bilirubin 0.5 mg/dL      eGFR Non African Amer 112 mL/min/1.73      Globulin 2.0 gm/dL      A/G Ratio 1.7 g/dL      BUN/Creatinine Ratio 9.6     Anion Gap 5.6 mmol/L     Narrative:       The MDRD GFR formula is only valid for adults with stable renal function between ages 18 and 70.    Magnesium [[id number]]  (Normal) Collected:  02/09/19 0534    Specimen:  Blood Updated:  02/09/19 0656     Magnesium 1.8 mg/dL     Phosphorus [[id number]]  (Abnormal) Collected:  02/09/19 0534    Specimen:  Blood Updated:  02/09/19 0656     Phosphorus 2.6 mg/dL     C-reactive Protein [[id number]]  (Abnormal) Collected:  02/09/19 0534    Specimen:  Blood Updated:  02/09/19 0646     C-Reactive Protein 4.63 mg/dL     CBC & Differential [[id number]] Collected:  02/09/19 0534    Specimen:  Blood Updated:  02/09/19 0620    Narrative:       The following orders were created for panel order CBC & Differential.  Procedure                               Abnormality         Status                     ---------                               -----------         ------                     CBC Auto Differential[[id number]]        Abnormal            Final result                 Please view results for these tests on the individual orders.    CBC Auto Differential [[id number]]  (Abnormal)  Collected:  02/09/19 0534    Specimen:  Blood Updated:  02/09/19 0620     WBC 9.59 10*3/mm3      RBC 3.97 10*6/mm3      Hemoglobin 11.4 g/dL      Hematocrit 36.4 %      MCV 91.7 fL      MCH 28.7 pg      MCHC 31.3 g/dL      RDW 16.1 %      RDW-SD 52.7 fl      MPV 10.3 fL      Platelets 226 10*3/mm3      Neutrophil % 73.4 %      Lymphocyte % 14.2 %      Monocyte % 11.5 %      Eosinophil % 0.4 %      Basophil % 0.2 %      Immature Grans % 0.3 %      Neutrophils, Absolute 7.04 10*3/mm3      Lymphocytes, Absolute 1.36 10*3/mm3      Monocytes, Absolute 1.10 10*3/mm3      Eosinophils, Absolute 0.04 10*3/mm3      Basophils, Absolute 0.02 10*3/mm3      Immature Grans, Absolute 0.03 10*3/mm3     POC Glucose Once [[id number]]  (Normal) Collected:  02/08/19 2102    Specimen:  Blood Updated:  02/08/19 2128     Glucose 119 mg/dL     Blood Culture - Blood, Arm, Right [[id number]] Collected:  02/06/19 2032    Specimen:  Blood from Arm, Right Updated:  02/08/19 2046     Blood Culture No growth at 2 days    Blood Culture - Blood, Arm, Left [[id number]] Collected:  02/06/19 2031    Specimen:  Blood from Arm, Left Updated:  02/08/19 2046     Blood Culture No growth at 2 days    Blood Gas, Venous [[id number]] Collected:  02/08/19 1655    Specimen:  Venous Blood Updated:  02/08/19 1702     Site Venous     pH, Venous 7.376 pH Units      pCO2, Venous 43.8 mm Hg      pO2, Venous 30.6 mm Hg      HCO3, Venous 25.1 mmol/L      Base Excess, Venous -0.3 mmol/L      O2 Saturation, Venous 57.3 %      Hemoglobin, Blood Gas 12.2 g/dL      A-a Gradiant 110.1 mmHg      Temperature 98.6 C      Barometric Pressure for Blood Gas 734 mmHg      Modality Cannula - Nasal     FIO2 28 %     POC Glucose Once [[id number]]  (Abnormal) Collected:  02/08/19 1648    Specimen:  Blood Updated:  02/08/19 1657     Glucose 153 mg/dL     POC Glucose Once [[id number]]  (Abnormal) Collected:  02/08/19 1149    Specimen:  Blood Updated:  02/08/19 1203     Glucose 147 mg/dL          Imaging Results (last 24 hours)     Procedure Component Value Units Date/Time    XR Chest AP [[id number]] Collected:  02/09/19 0951     Updated:  02/09/19 0953    Narrative:       XR CHEST AP-     CLINICAL INDICATION: Follow up pneumonia          COMPARISON: 2/6/2019      TECHNIQUE: Single frontal view of the chest.     FINDINGS:     There is no focal alveolar infiltrate or effusion.  The cardiac silhouette is normal. The pulmonary vasculature is  unremarkable.  There is no evidence of an acute osseous abnormality.   There are no suspicious-appearing parenchymal soft tissue nodules.            Impression:       No evidence of active or acute cardiopulmonary disease on today's chest  radiograph.         This report was finalized on 2/9/2019 9:51 AM by Dr. Daryl Rosas MD.           ECG/EMG Results (last 24 hours)     ** No results found for the last 24 hours. **           Physician Progress Notes (last 24 hours) (Notes from 2/8/2019 11:42 AM through 2/9/2019 11:42 AM)      Jaycob Coy DO at 2/8/2019  5:33 PM          Subjective     History:   Floridalma Bryant is a 84 y.o. female admitted on 2/6/2019 secondary to <principal problem not specified>     Procedures: None    CC: Follow up pneumonia    Patient seen and examined with ANNIKA Ge. Awake and alert. Much more awake and alert today. Oriented to person and knows she's in the hospital. States she wants to go home. Reports cough. Family states she does not like the thickened liquids. RN reports some intermittent agitation but overall stable. No acute events overnight per RN.     History taken from: patient's family, chart, and RN.      Objective     Vital Signs  Temp:  [98 °F (36.7 °C)-98.8 °F (37.1 °C)] 98.8 °F (37.1 °C)  Heart Rate:  [71-80] 71  Resp:  [18] 18  BP: (108-147)/(50-87) 144/56    Intake/Output Summary (Last 24 hours) at 2/8/2019 1733  Last data filed at 2/8/2019 1300  Gross per 24 hour   Intake 760 ml   Output 900 ml   Net -140 ml          Physical Exam:  General:    Awake, alert but confused, in no acute distress, chronically ill appearing, hard of hearing    Heart:      Normal S1 and S2. Regular rate and rhythm. No significant murmur, rubs or gallops appreciated.   Lungs:     Respirations regular, even and unlabored. Bibasilar crackles. No wheezes.     Abdomen:   Soft and nontender. No guarding, rebound tenderness or  organomegaly noted. Bowel sounds present x 4.   Extremities:  No clubbing, cyanosis or edema noted. Moves UE and LE equally B/L.     Results Review:    Results from last 7 days   Lab Units 02/08/19  0541 02/07/19  0411 02/06/19 1933   WBC 10*3/mm3 7.86 9.64 14.56*   HEMOGLOBIN g/dL 10.5* 11.8* 12.6   PLATELETS 10*3/mm3 191 227 241     Results from last 7 days   Lab Units 02/08/19  0529 02/07/19  0411 02/06/19  1933   SODIUM mmol/L 150 140 140   POTASSIUM mmol/L 4.6 3.5 3.6   CHLORIDE mmol/L 121* 108 104   CO2 mmol/L 18.4* 25.2 26.4   BUN mg/dL 10 13 14   CREATININE mg/dL 0.53 0.58 0.73   CALCIUM mg/dL 7.9 8.4 8.8   GLUCOSE mg/dL 116* 125* 140*     Results from last 7 days   Lab Units 02/08/19  0529 02/07/19  0411 02/06/19 1933   BILIRUBIN mg/dL 0.4 0.5 0.7   ALK PHOS U/L 49 52 62   AST (SGOT) U/L 12 9* 9*   ALT (SGPT) U/L 5* 5* 8*     Results from last 7 days   Lab Units 02/08/19  0350 02/06/19  1933   MAGNESIUM mg/dL 1.7 1.5*         Results from last 7 days   Lab Units 02/06/19  1933   TROPONIN I ng/mL 0.008       Imaging Results (last 24 hours)     ** No results found for the last 24 hours. **            Medications:    ceftriaxone 1 g Intravenous Q24H   citalopram 20 mg Oral Daily   clopidogrel 75 mg Oral Daily   doxycycline 100 mg Intravenous Q12H   ferrous sulfate 325 mg Oral Daily With Breakfast   guaiFENesin 1,200 mg Oral Q12H   I-chelo 1 tablet Oral Daily   insulin aspart 0-7 Units Subcutaneous 4x Daily AC & at Bedtime   lactobacillus acidophilus 1 capsule Oral Daily   levothyroxine 50 mcg Oral Daily   metroNIDAZOLE  500 mg Intravenous Q8H   pantoprazole 40 mg Oral QAM   rivaroxaban 15 mg Oral Daily With Dinner   rivastigmine 1 patch Transdermal Daily   sodium chloride 3 mL Intravenous Q12H   sodium phosphate IVPB 15 mmol Intravenous Once              Assessment/Plan   Severe sepsis with metabolic encephalopathy: Likely 2/2 bibasilar pneumonia. Febrile at outside clinic but afebrile since upon admission. Leukocytosis resolved with stable WBC today. CRP improved today Blood cultures with NGTD. Cont Rocephin, Doxycycline and Flagyl. Cont to follow cultures and repeat labs in the AM.    Bibasilar pneumonia: Community acquired with probable aspiration component as swallow eval reveals intermittent silent aspiration with thin liquids. Atypical workup is negative. Cont antibiotics as above. Repeat CXR in the AM. Cont modified diet with thickened liquids per speech recs. Discussed risks of recurrent aspiration and pneumonia with allowing patient to eat as she wishes for comfort vs continuing modified dietwith pt's daughter today as pt does not like current diet. She wishes to discuss with her siblings before deciding but she is leaning more allowing patient to eat for comfort.      Metabolic encephalopathy superimposed on baseline dementia: CT head negative for any acute intracranial abnormalities. Likely 2/2 above. Cont treatment of pneumonia. Cont supportive treatment with sitter present at bedside. Appears more alert today.     Recurrent UTI's: UA this admission not suggestive of UTI.    Borderline hypokalemia: Resolved today. Cont electrolyte replacement protocols.     Borderline hypernatremia: Stop NS. Repeat labs in the AM.     Pericardial effusion: CT chest reveals pericardial effusion. Echo reveals small pericardial effusion (<1cm), improved from previous.     PAF: Currently in sinus rhythm. Cont Xarelto for stroke prevention.     Chronic systolic CHF: Appears compensated at present. Repeat echo reveals an EF of 61-65%,  diastolic dysfunction and a small pericardial effusion (<1cm) improved from previous.. Monitor I/O's.     DM II, insulin dependent: HgbA1c 6.6. BG levels currently stable. Cont SSI with Accuchecks.     Essential HTN: BP low upon admission but improved today. Holding home antihypertensives. Will restart if BP begins trending upward.     DVT PPX: Xarelto    Pt is at high risk 2/2 severe sepsis, pneumonia, metabolic encephalopathy superimposed on baseline dementia, PAF, CHF and advanced age.       Jaycob Coy DO  19  5:33 PM    Electronically signed by Jaycob Coy DO at 2019  5:41 PM       Medical Student Notes (last 24 hours) (Notes from 2019 11:42 AM through 2019 11:42 AM)     No notes of this type exist for this encounter.        Consult Notes (last 24 hours) (Notes from 2019 11:42 AM through 2019 11:42 AM)     No notes of this type exist for this encounter.        Nutrition Notes (last 24 hours) (Notes from 2019 11:42 AM through 2019 11:42 AM)     No notes of this type exist for this encounter.        Physical Therapy Notes (last 24 hours) (Notes from 2019 11:42 AM through 2019 11:42 AM)     No notes of this type exist for this encounter.        Occupational Therapy Notes (last 24 hours) (Notes from 2019 11:42 AM through 2019 11:42 AM)     No notes of this type exist for this encounter.           Speech Language Pathology Notes (last 24 hours) (Notes from 2019 11:42 AM through 2019 11:42 AM)      Janis Bhat MA,CCC-SLP at 2019 12:02 PM          Acute Care - Speech Language Pathology   Swallow Treatment Note DEQUAN Vasquez     Patient Name: Floridalma Bryant  : 1934  MRN: [id number]  Today's Date: 2019             Admit Date: 2019    Visit Dx:    No diagnosis found.  Patient Active Problem List   Diagnosis   • NSTEMI (non-ST elevated myocardial infarction) (CMS/[facility])   • Essential hypertension   • Type II  diabetes mellitus (CMS/HCC)   • Dementia   • Pulmonary nodules   • RBBB   • Chronic right basal ganglia lacunar infarct on previous head CT   • Advanced age   • Hiatal hernia   • GERD (gastroesophageal reflux disease)   • Shortness of breath   • Hypoxia   • Sepsis (CMS/HCC)     Ms. Bryant is seen at bedside this am on 3N. She is a/a this am, sitter is present. She is verbally interactive today, confused and irritable. She evidences w/ baseline sporadic productive cough before, during and after this f/u, no direct correlation to po presentations. Observed sputum is clear and somewhat thick. She is s/p MBS 2/7/19 w/ intermittent silent aspiration w/ thin liquids. She accepts minimal po presentation of solid cracker x1 only, refuses further presentations, and nectar thick liquid via straw x3. No overt s/s aspiration, no s/s indicative of silent aspiration as she is w/o changes in vocal quality, respirations or secretions post po presentations. She is unable to fnx participate in trial dysphagia tx 2/2 current irritation and ams.     Ms. Bryant verbalizes dislike for nectar thick liquids. Sitter denies pt w/ overt s/s aspiration w/ po intake. Family friend/caregiver is present upon SLP exit, d/w her recommendations for nectar thick liquids w/ verbal understanding.     EDUCATION  The patient has been educated in the following areas:   Dysphagia (Swallowing Impairment) Modified Diet Instruction.    SLP Recommendation and Plan  Continue current mechanical soft diet w/ nectar thick liquids as tolerated. SLP to continue to f/u for diet tolerance, trial dysphagia tx pending pt status to functionally participate.     Diet modification sign posted at head of bed for staff and family reference.     Thank you-  Janis Bhat M.A., CCC-SLP    Plan of Care Reviewed With: patient  Plan of Care Review  Plan of Care Reviewed With: patient  Progress: no change    Time Calculation:   Time Calculation- SLP     Row Name 02/08/19 1206              Time Calculation- SLP    SLP - Next Appointment  02/11/19  -BRYCE        User Key  (r) = Recorded By, (t) = Taken By, (c) = Cosigned By    Initials Name Provider Type    Janis Miller MA,CCC-SLP Speech Therapist        Therapy Charges for Today     Code Description Service Date Service Provider Modifiers Qty    76323152090 HC ST MOTION FLUORO EVAL SWALLOW 8 2/7/2019 Janis Bhat MA,CCC-SLP GN 1    10385611537 HC ST TREATMENT SWALLOW 2 2/8/2019 Janis Bhat MA,CCC-SLP GN 1          Janis Bhat MA,CCC-SLP  2/8/2019    Electronically signed by Janis Bhat MA,CCC-SLP at 2/8/2019 12:48 PM     Janis Bhat MA,CCC-SLP at 2/8/2019 12:01 PM          Problem: Patient Care Overview  Goal: Plan of Care Review  Outcome: Ongoing (interventions implemented as appropriate)   02/08/19 1201   Coping/Psychosocial   Plan of Care Reviewed With patient   Plan of Care Review   Progress no change    Pt seen at bedside this am in 3N. She is a/a, upright and centered in bed, tolerating modified po diet w/o overt s/s aspiration. SLP to continue to f/u for diet tolerance.            Electronically signed by Janis Bhat MA,CCC-SLP at 2/8/2019 12:01 PM       Respiratory Therapy Notes (last 24 hours) (Notes from 2/8/2019 11:42 AM through 2/9/2019 11:42 AM)     No notes of this type exist for this encounter.          Discharge Summary     No notes of this type exist for this encounter.        Discharge Order (From admission, onward)    Start     Ordered    02/09/19 1034  Discharge patient  Once     Expected Discharge Date:  02/09/19    Discharge Disposition:  Home or Self Care    Physician of Record for Attribution - Please select from Treatment Team:  JATINDER TINOCO [[id number]]    Review needed by CMO to determine Physician of Record:  No       Question Answer Comment   Physician of Record for Attribution - Please select from Treatment  Team JATINDER TINOCO A    Review needed by CMO to determine Physician of Record No        02/09/19 1045

## 2019-02-09 NOTE — NURSING NOTE
Report called to Tabitha at [facility] [phone]. She is at home and does not know the fax number, She said maybe I could google it. It's not on google either. Cyndi Saucedo called and she will follow up with a fax on Monday. Thanks Cyndi Saucedo

## 2019-02-10 ENCOUNTER — READMISSION MANAGEMENT (OUTPATIENT)
Dept: CALL CENTER | Facility: HOSPITAL | Age: 84
End: 2019-02-10

## 2019-02-10 NOTE — PAYOR COMM NOTE
"[facility]   [place] [place]   PHONE [phone]  FAX  [phone]  NPI:  [id number]    PATIENT D/C 2/9/19      Floridalma Redmond (84 y.o. Female)     Date of Birth Social Security Number Address Home Phone MRN    1934  [address] [facility] [address] [phone] [id number]    Jewish Marital Status          Samaritan        Admission Date Admission Type Admitting Provider Attending Provider Department, Room/Bed    2/6/19 Urgent Jaycob Coy DO  [facility] [address] [place], 3346/1S    Discharge Date Discharge Disposition Discharge Destination        2/9/2019 Home or Self Care              Attending Provider:  (none)   Allergies:  Codeine, Metformin And Related    Isolation:  None   Infection:  None   Code Status:  Prior    Ht:  142.2 cm (56\")   Wt:  57 kg (125 lb 11.2 oz)    Admission Cmt:  None   Principal Problem:  None                Active Insurance as of 2/6/2019     Primary Coverage     Payor Plan Insurance Group Employer/Plan Group    HUMANA MEDICARE REPLACEMENT HUMANA MEDICARE REPL [id number]     Payor Plan Address Payor Plan Phone Number Payor Plan Fax Number Effective Dates    [address] [phone]  7/1/2018 - None Entered    [facility] [id number]       Subscriber Name Subscriber Birth Date Member ID       FLORIDALMA REDMOND 1934 [id number]           Secondary Coverage     Payor Plan Insurance Group Employer/Plan Group    [place]      Payor Plan Address Payor Plan Phone Number Payor Plan Fax Number Effective Dates    [address] [phone]  4/16/2018 - None Entered    [facility] [id number]       Subscriber Name Subscriber Birth Date Member ID       FLORIDALMA REDMOND 1934 [id number]                 Emergency Contacts      (Rel.) Home Phone Work Phone Mobile Phone    Jaz Loredo (Power of ) [phone] -- --              "

## 2019-02-10 NOTE — OUTREACH NOTE
Prep Survey      Responses   Facility patient discharged from?  [facility]   Is patient eligible?  Yes   Does the patient have one of the following disease processes/diagnoses(primary or secondary)?  Sepsis   Does the patient have Home health ordered?  Yes   What is the Home health agency?   [facility]   Is there a DME ordered?  Yes   What DME was ordered?  [facility],  she has all other DME at home   Prep survey completed?  Yes          Roxanne Calhoun RN

## 2019-02-11 ENCOUNTER — TELEPHONE (OUTPATIENT)
Dept: MEDSURG UNIT | Facility: HOSPITAL | Age: 84
End: 2019-02-11

## 2019-02-11 ENCOUNTER — READMISSION MANAGEMENT (OUTPATIENT)
Dept: CALL CENTER | Facility: HOSPITAL | Age: 84
End: 2019-02-11

## 2019-02-11 LAB
BACTERIA SPEC AEROBE CULT: NORMAL
BACTERIA SPEC AEROBE CULT: NORMAL
BACTERIA SPEC RESP CULT: NORMAL
GRAM STN SPEC: NORMAL
GRAM STN SPEC: NORMAL

## 2019-02-11 NOTE — PROGRESS NOTES
Discharge Planning Assessment   [facility]     Patient Name: Floridalma Bryant  MRN: [id number]  Today's Date: 2/11/2019    Admit Date: 2/6/2019  Pt was discharged home on 2/9/19. SS contacted [facility] [phone] per Crystal. SS faxed AVS to [facility] [phone]. No other needs identified.      Home Medical Care - Selection Complete      Service Provider Request Status Selected Services Address Phone Number Fax Number    [facility] Selected Home Health Services [address] [facility] [address] [phone] --     Eileen Rolon

## 2019-02-11 NOTE — OUTREACH NOTE
Sepsis Week 1 Survey      Responses   Facility patient discharged from?  [facility]   Does the patient have one of the following disease processes/diagnoses(primary or secondary)?  Sepsis   Is there a successful TCM telephone encounter documented?  No   Week 1 attempt successful?  No   Unsuccessful attempts  Attempt 1          Gonzalo Marino RN

## 2019-02-13 ENCOUNTER — READMISSION MANAGEMENT (OUTPATIENT)
Dept: CALL CENTER | Facility: HOSPITAL | Age: 84
End: 2019-02-13

## 2019-02-13 NOTE — OUTREACH NOTE
Sepsis Week 1 Survey      Responses   Facility patient discharged from?  [facility]   Does the patient have one of the following disease processes/diagnoses(primary or secondary)?  Sepsis   Is there a successful TCM telephone encounter documented?  No   Week 1 attempt successful?  Yes   Call start time  1436   Call end time  1446   Discharge diagnosis  sepsis with pneumonia   Person spoke with today (if not patient) and relationship  christa Sebastian   Meds reviewed with patient/caregiver?  Yes   Is the patient having any side effects they believe may be caused by any medication additions or changes?  No   Does the patient have all medications related to this admission filled (includes all antibiotics, inhalers, nebulizers,steroids,etc.)  No   What is keeping the patient from filling the prescriptions?  Script on hold per patient   Nursing Interventions  No intervention needed   Prescription comments  Robitussin DM contains alcohol and contraindicated with other meds, was prescribed Tussin DM (without alcohol)   Is the patient taking all medications as directed (includes completed medication regime)?  Yes   Does the patient have a primary care provider?   Yes   Does the patient have an appointment with their PCP within 7 days of discharge?  Yes   What is preventing the patient from scheduling follow up appointments within 7 days of discharge?  Earlier appointment not available   Nursing Interventions  Verified appointment date/time/provider   Has the patient kept scheduled appointments due by today?  N/A   What is the Home health agency?   [facility]   Has home health visited the patient within 72 hours of discharge?  Yes   Home health comments  PT, OT HH visits, speech therapist will be coming   Psychosocial issues?  No   Comments  daughterJaz states appetite is poor, speech therapy HH is coming to assist pt   Did the patient receive a copy of their discharge instructions?  Yes   Nursing interventions   Reviewed instructions with patient   What is the patient's perception of their health status since discharge?  Improving   Nursing interventions  Nurse provided patient education   Is the patient/caregiver able to teach back Sepsis?  S - Shivering,fever or very cold, E - Extreme pain or generalized discomfort (worst ever,especially abdomen), P - Pale or discolored skin, S - Sleepy, difficult to arouse,confused, I -   I feel like I might die-a feeling of hopelessness, S - Short of breath   Nursing interventions  Nurse provided reassurance to patient, Nurse provided patient education   Is patient/caregiver able to teach back steps to recovery at home?  Set small, achievable goals for return to baseline health, Rest and regain strength, Talk about feelings with family/friends   Is the patient/caregiver able to teach back signs and symptoms of worsening condition:  Fever, Hyperthermia, Rapid heart rate (>90), Shortness of breath/rapid respiratory rate, Altered mental status(confusion/coma)   Is the patient/caregiver able to teach back the hierarchy of who to call/visit for symptoms/problems? PCP, Specialist, Home health nurse, Urgent Care, ED, 911  Yes   Additional teach back comments  daughter states pt is confused most of time, states good understanding of S and S of sepsis   Week 1 call completed?  Yes          Teresa Avalos RN

## 2019-02-18 ENCOUNTER — OFFICE VISIT (OUTPATIENT)
Dept: CARDIOLOGY | Facility: CLINIC | Age: 84
End: 2019-02-18

## 2019-02-18 VITALS
DIASTOLIC BLOOD PRESSURE: 72 MMHG | HEART RATE: 77 BPM | BODY MASS INDEX: 23.59 KG/M2 | SYSTOLIC BLOOD PRESSURE: 127 MMHG | HEIGHT: 58 IN | OXYGEN SATURATION: 95 % | WEIGHT: 112.4 LBS

## 2019-02-18 DIAGNOSIS — I25.10 CORONARY ARTERY DISEASE INVOLVING NATIVE CORONARY ARTERY OF NATIVE HEART WITHOUT ANGINA PECTORIS: Primary | ICD-10-CM

## 2019-02-18 DIAGNOSIS — I10 ESSENTIAL HYPERTENSION: Chronic | ICD-10-CM

## 2019-02-18 PROCEDURE — 99214 OFFICE O/P EST MOD 30 MIN: CPT | Performed by: NURSE PRACTITIONER

## 2019-02-18 RX ORDER — ATORVASTATIN CALCIUM 40 MG/1
40 TABLET, FILM COATED ORAL DAILY
Qty: 30 TABLET | Refills: 11 | Status: SHIPPED | OUTPATIENT
Start: 2019-02-18

## 2019-02-18 RX ORDER — LEVOTHYROXINE SODIUM 0.05 MG/1
TABLET ORAL
COMMUNITY
Start: 2019-01-22

## 2019-02-18 NOTE — PROGRESS NOTES
Subjective     Chief Complaint: Coronary Artery Disease and Atrial Fibrillation    History of Present Illness   Floridalma Bryant is a 84 y.o. female who presents with a past medical history significant for non-STEMI, no intervention, systolic heart failure with recovered LVEF of 61-65% per echocardiogram of 2/7/2019, excisional atrial fibrillation, chronically anticoagulated on Xarelto, essential hypertension, dyslipidemia, insulin-dependent diabetes type 2, recurrent UTIs and dementia.  She is accompanied today by her daughter and a sitter for a regular follow-up.  She had a recent admission from 2/6/2019 2 2/9/2019 for severe sepsis with metabolic encephalopathy, by basilar pneumonia with probable aspiration component.    Mrs. Nichols is pleasantly confused at this visit.  She states that she has chest pain however she is rubbing and pointing to her stomach at the same time.  Her daughter states that she that she has had chest pain in the past but always points to her stomach.      Current Outpatient Medications:   •  acetaminophen (TYLENOL) 325 MG tablet, Take 2 tablets by mouth Every 4 (Four) Hours As Needed for Headache or Fever (temperature greater than 101.5 F)., Disp: , Rfl:   •  benzonatate (TESSALON) 200 MG capsule, Take 1 capsule by mouth 3 (Three) Times a Day As Needed for Cough., Disp: 30 capsule, Rfl: 0  •  citalopram (CeleXA) 10 MG tablet, Take 20 mg by mouth Daily., Disp: , Rfl:   •  docusate calcium (SURFAK) 240 MG capsule, Take 240 mg by mouth Daily As Needed for Constipation., Disp: , Rfl:   •  ferrous sulfate 325 (65 FE) MG tablet, Take 325 mg by mouth Daily With Breakfast., Disp: , Rfl:   •  furosemide (LASIX) 20 MG tablet, Take 2 tablets by mouth Daily., Disp: 30 tablet, Rfl: 3  •  guaifenesin-dextromethorphan (ROBITUSSIN DM) 100-10 MG/5ML syrup, Take 5 mL by mouth Every 4 (Four) Hours As Needed for Cough., Disp: 236 mL, Rfl: 0  •  insulin aspart (novoLOG) 100 UNIT/ML injection, Inject 10 Units  "under the skin 3 (Three) Times a Day With Meals. (Patient taking differently: Inject 8 Units under the skin into the appropriate area as directed 3 (Three) Times a Day With Meals.), Disp: , Rfl: 12  •  insulin detemir (LEVEMIR) 100 UNIT/ML injection, Inject 15 Units under the skin into the appropriate area as directed Every Night., Disp: , Rfl: 12  •  isosorbide mononitrate (IMDUR) 30 MG 24 hr tablet, Take 1 tablet by mouth Daily., Disp: 30 tablet, Rfl: 11  •  levothyroxine (SYNTHROID, LEVOTHROID) 50 MCG tablet, , Disp: , Rfl:   •  magnesium oxide (MAGOX) 400 (241.3 Mg) MG tablet tablet, Take 1 tablet by mouth Daily., Disp: 30 each, Rfl: 0  •  Multiple Vitamins-Minerals (ICAPS AREDS 2 PO), Take 1 capsule by mouth Daily., Disp: , Rfl:   •  nitrofurantoin, macrocrystal-monohydrate, (MACROBID) 100 MG capsule, Take 100 mg by mouth Daily., Disp: , Rfl:   •  omeprazole (priLOSEC) 20 MG capsule, Take 20 mg by mouth Daily., Disp: , Rfl:   •  potassium chloride (K-DUR) 10 MEQ CR tablet, Take 10 mEq by mouth 2 (Two) Times a Day., Disp: , Rfl:   •  rivaroxaban (XARELTO) 15 MG tablet, Take 1 tablet by mouth Daily With Dinner., Disp: 42 tablet, Rfl:   •  rivastigmine (EXELON) 9.5 MG/24HR patch, Place 1 patch on the skin Daily., Disp: , Rfl:   •  aspirin 81 MG tablet, Take 1 tablet by mouth Daily., Disp: 30 tablet, Rfl: 11  •  atorvastatin (LIPITOR) 40 MG tablet, Take 1 tablet by mouth Daily., Disp: 30 tablet, Rfl: 11  •  levothyroxine (SYNTHROID, LEVOTHROID) 25 MCG tablet, Take 50 mcg by mouth Daily., Disp: , Rfl:      The following portions of the patient's history were reviewed and updated as appropriate: allergies, current medications, past family history, past medical history, past social history, past surgical history and problem list.    Review of Systems   Unable to perform ROS: dementia         Objective     /72 (BP Location: Right arm)   Pulse 77   Ht 147.3 cm (58\")   Wt 51 kg (112 lb 6.4 oz)   SpO2 95%   BMI " 23.49 kg/m²     Physical Exam   Constitutional: She is oriented to person, place, and time. She appears well-developed and well-nourished.   HENT:   Head: Normocephalic and atraumatic.   Eyes: Pupils are equal, round, and reactive to light.   Neck: No JVD present.   Cardiovascular: Normal rate, regular rhythm and intact distal pulses. Exam reveals no gallop and no friction rub.   No murmur heard.  Pulmonary/Chest: Effort normal. No respiratory distress. She has wheezes (light expiratory). She has no rales.   Abdominal: Soft. Bowel sounds are normal. She exhibits no mass. There is no tenderness. No hernia.   Neurological: She is oriented to person, place, and time.   Skin: Skin is warm and dry.   Psychiatric: She has a normal mood and affect.       Procedures      Assessment/Plan       Floridalma was seen today for coronary artery disease and atrial fibrillation.    Diagnoses and all orders for this visit:      Assessment:  1. Non-STEMI, no intervention, on 10/26/2017  2. Paroxysmal atrial fibrillation, chronically anticoagulated on Xarelto  3. Systolic heart failure, well compensated, with recovered LVEF.  4. Hypertension  5. Dyslipidemia  6. Dementia      Plan:  1. Patient to continue Xarelto.  2. I have instructed her daughter to discontinue Plavix and add aspirin 81 mg.  3. For some reason atorvastatin was dropped from her recent admission/discharge medications.  I have added this back into her medication list.  4. Return to clinic in 6 months, sooner for any worsening or concerning symptoms.      Return in about 6 months (around 8/18/2019).        LILY Hoang

## 2019-02-20 ENCOUNTER — READMISSION MANAGEMENT (OUTPATIENT)
Dept: CALL CENTER | Facility: HOSPITAL | Age: 84
End: 2019-02-20

## 2019-02-20 NOTE — OUTREACH NOTE
Sepsis Week 2 Survey      Responses   Facility patient discharged from?  [facility]   Does the patient have one of the following disease processes/diagnoses(primary or secondary)?  Sepsis   Week 2 attempt successful?  Yes   Call start time  1207   Call end time  1210   Discharge diagnosis  sepsis with pneumonia   Person spoke with today (if not patient) and relationship  Zari, daughter   Meds reviewed with patient/caregiver?  Yes   Is the patient taking all medications as directed (includes completed medication regime)?  Yes   Medication comments  Ordered nebulizer but awaiting insurance approval   Does the patient have a primary care provider?   Yes   Does the patient have an appointment with their PCP within 7 days of discharge?  Yes   Has the patient kept scheduled appointments due by today?  Yes   Comments  At appt was told still has pneumonia and needs nebulizer   What is the patient's perception of their health status since discharge?  Improving   Week 2 call completed?  Yes   Wrap up additional comments  Stayed up 24 hr then was confused the next day. This is not typical for her to be talking out of her head.  Breathing easy at rest. Still coughing.  is working to get her nebulizer approved.           Madeline Chatman, RN

## 2019-02-27 ENCOUNTER — READMISSION MANAGEMENT (OUTPATIENT)
Dept: CALL CENTER | Facility: HOSPITAL | Age: 84
End: 2019-02-27

## 2019-02-27 NOTE — OUTREACH NOTE
Sepsis Week 3 Survey      Responses   Facility patient discharged from?  [facility]   Does the patient have one of the following disease processes/diagnoses(primary or secondary)?  Sepsis   Week 3 attempt successful?  No   Unsuccessful attempts  Attempt 1          Yue Santos RN

## 2019-02-28 ENCOUNTER — READMISSION MANAGEMENT (OUTPATIENT)
Dept: CALL CENTER | Facility: HOSPITAL | Age: 84
End: 2019-02-28

## 2019-02-28 NOTE — OUTREACH NOTE
Sepsis Week 3 Survey      Responses   Facility patient discharged from?  [facility]   Does the patient have one of the following disease processes/diagnoses(primary or secondary)?  Sepsis   Week 3 attempt successful?  Yes   Call start time  1024   Call end time  1036   Discharge diagnosis  sepsis with pneumonia   Person spoke with today (if not patient) and relationship  Zari, daughter   Meds reviewed with patient/caregiver?  Yes   Is the patient taking all medications as directed (includes completed medication regime)?  Yes   Medication comments  Macrobid completed Monday   Has the patient kept scheduled appointments due by today?  Yes   Comments  Has talked to the PCP about nebulizer and has not heard anything from that.  nurse is coming today.   What is the Home health agency?   [facility]   Home health comments  OT in home at time of call 138/69  afebrile  O2 sat was good   What is the patient's perception of their health status since discharge?  Worsening [Daughter thinks she has a UTI.  Home Health is coming today to  a urine sample for testing.]   Week 3 call completed?  Yes          Shantel Hernandez RN

## 2019-03-08 ENCOUNTER — READMISSION MANAGEMENT (OUTPATIENT)
Dept: CALL CENTER | Facility: HOSPITAL | Age: 84
End: 2019-03-08

## 2019-03-08 NOTE — OUTREACH NOTE
Sepsis Week 4 Survey      Responses   Facility patient discharged from?  [facility]   Does the patient have one of the following disease processes/diagnoses(primary or secondary)?  Sepsis   Week 4 attempt successful?  Yes   Call start time  1228   Call end time  1237   General alerts for this patient  Patient's  passed away this week.    Discharge diagnosis  sepsis with pneumonia   Is patient permission given to speak with other caregiver?  Yes   Person spoke with today (if not patient) and relationship  Zari, daughter   Medication alerts for this patient  antibiotic and steroid placed by PCP   Meds reviewed with patient/caregiver?  Yes   Is the patient having any side effects they believe may be caused by any medication additions or changes?  No   Is the patient taking all medications as directed (includes completed medication regime)?  Yes   Has the patient kept scheduled appointments due by today?  Yes   Is the patient still receiving Home Health Services?  Yes   Psychosocial issues?  Yes   Psychosocial comments   of patient just passed away   Comments  Daughter reports she is on another antibiotic and steroid for fluids in lungs. No fever.    What is the patient's perception of their health status since discharge?  Same   Nursing interventions  Nurse provided patient education   Is the patient/caregiver able to teach back Sepsis?  S - Shivering,fever or very cold, E - Extreme pain or generalized discomfort (worst ever,especially abdomen), P - Pale or discolored skin, S - Sleepy, difficult to arouse,confused, I -   I feel like I might die-a feeling of hopelessness, S - Short of breath   Nursing interventions  Nurse provided reassurance to patient, Nurse provided patient education, Advised patient to call provider, Advised patient to seek immediate care   Is patient/caregiver able to teach back steps to recovery at home?  Set small, achievable goals for return to baseline health, Rest and regain  strength, Make a list of questions for PCP appoinment   Is the patient/caregiver able to teach back signs and symptoms of worsening condition:  Fever, Hyperthermia, Shortness of breath/rapid respiratory rate, Rapid heart rate (>90), Altered mental status(confusion/coma), Edema, High blood glucose without diabetes   Is the patient/caregiver able to teach back the hierarchy of who to call/visit for symptoms/problems? PCP, Specialist, Home health nurse, Urgent Care, ED, 911  Yes   Additional teach back comments  daughter states pt is confused most of time, states good understanding of S and S of sepsis   Week 4 call completed?  Yes   Would the patient like one additional call?  No          Gonzalo Marino RN

## 2025-02-26 NOTE — PROGRESS NOTES
[facility] HOSPITALIST PROGRESS NOTE     Patient Identification:  Name:  Floridalma Bryant  Age:  83 y.o.  Sex:  female  :  1934  MRN:  [id number]  Visit Number:  [id number]  Primary Care Provider:  LILY Christensen    Length of stay:  7    Chief complaint:  Cough    Subjective:  History has been obtained from both the patient and her daughter at bedside as patient has significant amount of dementia.  Patient still with recurrent cough which is nonproductive.  When asked if breathing treatments help with her cough, she cannot remember that she has had any breathing treatments at all.  Patient's daughter states her cough is relatively unchanged.  Overall, no significant changes in the past 24 hours.  ----------------------------------------------------------------------------------------------------------------------  Current Hospital Meds:    acetylcysteine 600 mg Inhalation Q12H   amiodarone 200 mg Oral TID   atorvastatin 80 mg Oral Nightly   carvedilol 6.25 mg Oral BID With Meals   ceftriaxone 1 g Intravenous Q24H   cetirizine 5 mg Oral Daily   cholecalciferol 50,000 Units Oral Weekly   clopidogrel 75 mg Oral Daily   ferrous sulfate 325 mg Oral Daily With Breakfast   furosemide 40 mg Intravenous Q12H   hydrocortisone  Rectal BID   insulin aspart 0-7 Units Subcutaneous 4x Daily AC & at Bedtime   insulin aspart 0-7 Units Subcutaneous Once   insulin aspart 5 Units Subcutaneous TID With Meals   insulin detemir 25 Units Subcutaneous Nightly   ipratropium 0.5 mg Nebulization 4x Daily - RT   methylPREDNISolone sodium succinate 20 mg Intravenous Q12H   metroNIDAZOLE 500 mg Intravenous Q8H   pantoprazole 40 mg Oral QAM   Risaquad-2 1 capsule Oral Daily   rivaroxaban 15 mg Oral Daily With Dinner   rivastigmine 1 patch Transdermal Daily   sennosides-docusate sodium 2 tablet Oral Nightly   valsartan 80 mg Oral Q24H         ----------------------------------------------------------------------------------------------------------------------  Vital Signs:  Temp:  [97.7 °F (36.5 °C)-98.3 °F (36.8 °C)] 97.9 °F (36.6 °C)  Heart Rate:  [63-91] 91  Resp:  [18-28] 23  BP: (100-139)/(50-90) 139/65  Last 3 weights    10/31/17  0401 11/01/17  0402 11/02/17  0400   Weight: 179 lb 8 oz (81.4 kg) 177 lb 1.6 oz (80.3 kg) 173 lb 3.2 oz (78.6 kg)     Body mass index is 34.98 kg/(m^2).    Intake/Output Summary (Last 24 hours) at 11/02/17 1134  Last data filed at 11/02/17 0800   Gross per 24 hour   Intake              445 ml   Output             3275 ml   Net            -2830 ml     Diet Soft Texture; Ground; Thin; Cardiac, Consistent Carbohydrate  ----------------------------------------------------------------------------------------------------------------------  Physical exam:  Constitutional: Well-nourished  female in no apparent distress.     HENT:  Head:  Normocephalic and atraumatic.  Mouth:  Moist mucous membranes.    Eyes:  Conjunctivae and EOM are normal.  Pupils are equal, round, and reactive to light.  No scleral icterus.    Neck:  Neck supple. No thyromegaly.  No JVD present.    Cardiovascular:  Regular rate and rhythm with no murmurs, rubs, clicks or gallops appreciated.  Pulmonary/Chest:  End expiratory wheezes both anteriorly and posteriorly with no crackles, wheezes or rhonchi appreciated.  Abdominal:  Soft. Nontender. Nondistended  Bowel sounds are normal in all four quadrants. No organomegally appreciated.   Musculoskeletal:  5/5 muscle strength bilateral upper and lower extermties with equal muscle tone and bulk. No edema, no tenderness, and no deformity.  No red or swollen joints anywhere.    Neurological:  Alert, Cranial nerves II-XII intact with no focal defecits.  No facial droop.  No slurred speech.   Skin:  Warm and dry to palpation with no rashes or lesions appreciated.  Peripheral vascular:  2+ radial and pedal  Detail Level: Simple pulses in bilateral upper and lower extremities.  Psychiatric:  Alert and oriented to place and person but not time.  ----------------------------------------------------------------------------------------------------------------------  Tele:    ----------------------------------------------------------------------------------------------------------------------    Results from last 7 days  Lab Units 10/29/17  0032 10/28/17  0029 10/27/17  0853 10/26/17  2114 10/26/17  1514   CK TOTAL U/L 49 96 211* 265* 235*   CKMB ng/mL 4.03 11.08* 26.32* 31.58* 26.35*   CK MB INDEX % 8.2* 11.5* 12.5* 11.9* 11.2*   TROPONIN I ng/mL 2.389* 8.083* 16.021* 13.235* 11.444*   MYOGLOBIN ng/mL  --   --   --  130.0* 160.0*       Results from last 7 days  Lab Units 11/02/17  0041 11/01/17  0954 10/31/17  0331  10/29/17  0032 10/28/17  1008   CRP mg/dL 1.72*  --  3.64*  --   --  8.38*   LACTATE mmol/L  --   --   --   --  1.6  --    WBC 10*3/mm3 14.80* 15.30* 7.99  < >  --   --    HEMOGLOBIN g/dL 12.3 12.3 11.8*  < >  --   --    HEMATOCRIT % 38.6 39.4 38.3  < >  --   --    MCV fL 84.5 84.9 85.7  < >  --   --    MCHC g/dL 31.9* 31.2* 30.8*  < >  --   --    PLATELETS 10*3/mm3 323 290 205  < >  --   --    < > = values in this interval not displayed.        Results from last 7 days  Lab Units 11/02/17  0824 11/02/17  0041 11/01/17  0954 10/31/17  0331   SODIUM mmol/L  --  137 138 141   POTASSIUM mmol/L  --  4.0 4.4 3.6   MAGNESIUM mg/dL 2.1 2.3 1.8 1.6*   CHLORIDE mmol/L  --  96* 99 104   CO2 mmol/L  --  30.3 30.5 31.3   BUN mg/dL  --  27* 22* 11   CREATININE mg/dL  --  1.06 0.92 0.78   EGFR IF NONAFRICN AM mL/min/1.73  --  50* 58* 71   CALCIUM mg/dL  --  8.7 8.8 8.8   GLUCOSE mg/dL  --  321* 342* 140*   ALBUMIN g/dL  --   --   --  3.30*   BILIRUBIN mg/dL  --   --   --  0.4   ALK PHOS U/L  --   --   --  64   AST (SGOT) U/L  --   --   --  21   ALT (SGPT) U/L  --   --   --  19   Estimated Creatinine Clearance: 37.3 mL/min (by C-G formula based on  Additional Notes: Patient present today for his 6 weeks ED&C f/u. Well healed scar, NERD Cr of 1.06).    No results found for: AMMONIA      Blood Culture   Date Value Ref Range Status   10/28/2017 No growth at 4 days  Preliminary   10/28/2017 No growth at 5 days  Final     Urine Culture   Date Value Ref Range Status   10/28/2017 No growth  Final             I have personally looked at the labs and they are summarized above.  ----------------------------------------------------------------------------------------------------------------------  Imaging Results (last 24 hours)     Procedure Component Value Units Date/Time    FL Video Swallow [[id number]] Collected:  11/02/17 1021     Updated:  11/02/17 1035    Narrative:       FL VIDEO SWALLOW-     CLINICAL INDICATION: aspiration; I21.4-Non-ST elevation (NSTEMI)  myocardial infarction; N30.00-Acute cystitis without hematuria.          TECHNIQUE:   Speech therapy service was present. The patient was examined in the  sitting lateral position and was given several consistencies of barium.     FINDINGS: Deep penetration with thin liquids, but it appeared to clear.  No convincing evidence of aspiration.     FLUOROSCOPY TIME: 1.7 minutes.     Images: Cine loop was acquired       Impression:       No evidence of aspiration.     For additional information please see the report provided by the speech  therapy service     This report was finalized on 11/2/2017 10:33 AM by Dr. Daryl Rosas MD.           ----------------------------------------------------------------------------------------------------------------------  Assessment and Plan:    1. NSTEMI - continue medical management as per family wishes no intervention at this time.  Patient is currently chest pain-free.  Continue Plavix, beta blocker and statin.    2.  Persistent cough - etiology unclear but most likely secondary to silent aspiration.  Lisinopril has been changed to Diovan.  After thorough evaluation by speech therapy, recommendations have been made for modified diet.    3.  Chronic systolic  Render Risk Assessment In Note?: no congestive heart failure - continue Lasix, repeat chest x-ray in the morning. No physical exam findings consistent with acute exacerbation.    4.  Community acquired pneumonia - continue Rocephin/Flagyl antibiotic day 7.  Repeat chest x-ray in the morning. Likely D/C antibiotics in next 24/48 hours.    5.  Acute cystitis - urine culture negative to date.  Will continue Rocephin/Flagyl for pneumonia treatment.  This will likely cross cover any acute cystitis.    6.  Paroxysmal A. Fib - remains in sinus rhythm, continue amiodarone and xarelto    7. Chronic Dementia - Stable    8. Severe Sepsis - POA, secondary to PNA, now resolved.    9. IDDMII - Uncontrolled, elevated blood sugars likely secondary to steroid use. Will increase Levemir during this hospitalization and would anticipate returning to home dose of insulin upon discharge.    Pending SNF palcement.        Justin Betancourt,   11/02/17  11:34 AM